# Patient Record
Sex: MALE | Race: BLACK OR AFRICAN AMERICAN | NOT HISPANIC OR LATINO | URBAN - METROPOLITAN AREA
[De-identification: names, ages, dates, MRNs, and addresses within clinical notes are randomized per-mention and may not be internally consistent; named-entity substitution may affect disease eponyms.]

---

## 2017-07-05 ENCOUNTER — EMERGENCY (EMERGENCY)
Facility: HOSPITAL | Age: 52
LOS: 1 days | Discharge: HOME | End: 2017-07-05
Attending: EMERGENCY MEDICINE
Payer: COMMERCIAL

## 2017-07-05 VITALS
TEMPERATURE: 98 F | DIASTOLIC BLOOD PRESSURE: 108 MMHG | RESPIRATION RATE: 16 BRPM | OXYGEN SATURATION: 100 % | SYSTOLIC BLOOD PRESSURE: 143 MMHG | HEART RATE: 88 BPM

## 2017-07-05 LAB
ALBUMIN SERPL ELPH-MCNC: 3.8 G/DL — SIGNIFICANT CHANGE UP (ref 3.3–5)
ALP SERPL-CCNC: 82 U/L — SIGNIFICANT CHANGE UP (ref 40–120)
ALT FLD-CCNC: 21 U/L — SIGNIFICANT CHANGE UP (ref 4–41)
APTT BLD: 44.7 SEC — HIGH (ref 27.5–37.4)
AST SERPL-CCNC: 32 U/L — SIGNIFICANT CHANGE UP (ref 4–40)
BASE EXCESS BLDV CALC-SCNC: 4.6 MMOL/L — SIGNIFICANT CHANGE UP
BASOPHILS # BLD AUTO: 0.03 K/UL — SIGNIFICANT CHANGE UP (ref 0–0.2)
BASOPHILS NFR BLD AUTO: 0.3 % — SIGNIFICANT CHANGE UP (ref 0–2)
BILIRUB SERPL-MCNC: 0.4 MG/DL — SIGNIFICANT CHANGE UP (ref 0.2–1.2)
BLOOD GAS VENOUS - CREATININE: 1.08 MG/DL — SIGNIFICANT CHANGE UP (ref 0.5–1.3)
BUN SERPL-MCNC: 10 MG/DL — SIGNIFICANT CHANGE UP (ref 7–23)
CALCIUM SERPL-MCNC: 9.1 MG/DL — SIGNIFICANT CHANGE UP (ref 8.4–10.5)
CHLORIDE BLDV-SCNC: 105 MMOL/L — SIGNIFICANT CHANGE UP (ref 96–108)
CHLORIDE SERPL-SCNC: 99 MMOL/L — SIGNIFICANT CHANGE UP (ref 98–107)
CO2 SERPL-SCNC: 25 MMOL/L — SIGNIFICANT CHANGE UP (ref 22–31)
CREAT SERPL-MCNC: 1.21 MG/DL — SIGNIFICANT CHANGE UP (ref 0.5–1.3)
EOSINOPHIL # BLD AUTO: 0.07 K/UL — SIGNIFICANT CHANGE UP (ref 0–0.5)
EOSINOPHIL NFR BLD AUTO: 0.8 % — SIGNIFICANT CHANGE UP (ref 0–6)
GAS PNL BLDV: 131 MMOL/L — LOW (ref 136–146)
GLUCOSE BLDV-MCNC: 147 — HIGH (ref 70–99)
GLUCOSE SERPL-MCNC: 98 MG/DL — SIGNIFICANT CHANGE UP (ref 70–99)
HCO3 BLDV-SCNC: 28 MMOL/L — HIGH (ref 20–27)
HCT VFR BLD CALC: 37.5 % — LOW (ref 39–50)
HCT VFR BLDV CALC: 38.8 % — LOW (ref 39–51)
HGB BLD-MCNC: 13.2 G/DL — SIGNIFICANT CHANGE UP (ref 13–17)
HGB BLDV-MCNC: 12.6 G/DL — LOW (ref 13–17)
IMM GRANULOCYTES # BLD AUTO: 0.03 # — SIGNIFICANT CHANGE UP
IMM GRANULOCYTES NFR BLD AUTO: 0.3 % — SIGNIFICANT CHANGE UP (ref 0–1.5)
INR BLD: 0.92 — SIGNIFICANT CHANGE UP (ref 0.88–1.17)
LACTATE BLDV-MCNC: 1.3 MMOL/L — SIGNIFICANT CHANGE UP (ref 0.5–2)
LIDOCAIN IGE QN: 54.8 U/L — SIGNIFICANT CHANGE UP (ref 7–60)
LYMPHOCYTES # BLD AUTO: 1.87 K/UL — SIGNIFICANT CHANGE UP (ref 1–3.3)
LYMPHOCYTES # BLD AUTO: 20.7 % — SIGNIFICANT CHANGE UP (ref 13–44)
MCHC RBC-ENTMCNC: 30.8 PG — SIGNIFICANT CHANGE UP (ref 27–34)
MCHC RBC-ENTMCNC: 35.2 % — SIGNIFICANT CHANGE UP (ref 32–36)
MCV RBC AUTO: 87.6 FL — SIGNIFICANT CHANGE UP (ref 80–100)
MONOCYTES # BLD AUTO: 1.04 K/UL — HIGH (ref 0–0.9)
MONOCYTES NFR BLD AUTO: 11.5 % — SIGNIFICANT CHANGE UP (ref 2–14)
NEUTROPHILS # BLD AUTO: 6 K/UL — SIGNIFICANT CHANGE UP (ref 1.8–7.4)
NEUTROPHILS NFR BLD AUTO: 66.4 % — SIGNIFICANT CHANGE UP (ref 43–77)
NRBC # FLD: 0 — SIGNIFICANT CHANGE UP
PCO2 BLDV: 42 MMHG — SIGNIFICANT CHANGE UP (ref 41–51)
PH BLDV: 7.45 PH — HIGH (ref 7.32–7.43)
PLATELET # BLD AUTO: 211 K/UL — SIGNIFICANT CHANGE UP (ref 150–400)
PMV BLD: 11.5 FL — SIGNIFICANT CHANGE UP (ref 7–13)
PO2 BLDV: 38 MMHG — SIGNIFICANT CHANGE UP (ref 35–40)
POTASSIUM BLDV-SCNC: 3.9 MMOL/L — SIGNIFICANT CHANGE UP (ref 3.4–4.5)
POTASSIUM SERPL-MCNC: 4.2 MMOL/L — SIGNIFICANT CHANGE UP (ref 3.5–5.3)
POTASSIUM SERPL-SCNC: 4.2 MMOL/L — SIGNIFICANT CHANGE UP (ref 3.5–5.3)
PROT SERPL-MCNC: 6.8 G/DL — SIGNIFICANT CHANGE UP (ref 6–8.3)
PROTHROM AB SERPL-ACNC: 10.3 SEC — SIGNIFICANT CHANGE UP (ref 9.8–13.1)
RBC # BLD: 4.28 M/UL — SIGNIFICANT CHANGE UP (ref 4.2–5.8)
RBC # FLD: 13.2 % — SIGNIFICANT CHANGE UP (ref 10.3–14.5)
SAO2 % BLDV: 70.4 % — SIGNIFICANT CHANGE UP (ref 60–85)
SODIUM SERPL-SCNC: 137 MMOL/L — SIGNIFICANT CHANGE UP (ref 135–145)
WBC # BLD: 9.04 K/UL — SIGNIFICANT CHANGE UP (ref 3.8–10.5)
WBC # FLD AUTO: 9.04 K/UL — SIGNIFICANT CHANGE UP (ref 3.8–10.5)

## 2017-07-05 PROCEDURE — 76705 ECHO EXAM OF ABDOMEN: CPT | Mod: 26

## 2017-07-05 PROCEDURE — 99284 EMERGENCY DEPT VISIT MOD MDM: CPT

## 2017-07-05 RX ORDER — KETOROLAC TROMETHAMINE 30 MG/ML
15 SYRINGE (ML) INJECTION ONCE
Qty: 0 | Refills: 0 | Status: DISCONTINUED | OUTPATIENT
Start: 2017-07-05 | End: 2017-07-05

## 2017-07-05 RX ORDER — ONDANSETRON 8 MG/1
4 TABLET, FILM COATED ORAL ONCE
Qty: 0 | Refills: 0 | Status: COMPLETED | OUTPATIENT
Start: 2017-07-05 | End: 2017-07-05

## 2017-07-05 RX ORDER — OMEPRAZOLE 10 MG/1
1 CAPSULE, DELAYED RELEASE ORAL
Qty: 30 | Refills: 0
Start: 2017-07-05 | End: 2017-08-04

## 2017-07-05 RX ORDER — SODIUM CHLORIDE 9 MG/ML
2000 INJECTION INTRAMUSCULAR; INTRAVENOUS; SUBCUTANEOUS ONCE
Qty: 0 | Refills: 0 | Status: COMPLETED | OUTPATIENT
Start: 2017-07-05 | End: 2017-07-05

## 2017-07-05 RX ADMIN — ONDANSETRON 4 MILLIGRAM(S): 8 TABLET, FILM COATED ORAL at 18:32

## 2017-07-05 RX ADMIN — SODIUM CHLORIDE 2000 MILLILITER(S): 9 INJECTION INTRAMUSCULAR; INTRAVENOUS; SUBCUTANEOUS at 18:32

## 2017-07-05 RX ADMIN — Medication 15 MILLIGRAM(S): at 18:32

## 2017-07-05 NOTE — ED PROVIDER NOTE - MEDICAL DECISION MAKING DETAILS
50 y/o M chronic smoker with epigastric abdominal pain x 5 days and unintentional weight loss x months. scleral icterus, epigastric abd pain. Concern for biliary pathology vs colon CA vs PUD. cbc, cmp, lipase, US GB/liver, pain control, IVF

## 2017-07-05 NOTE — ED PROVIDER NOTE - PROGRESS NOTE DETAILS
Patient's symptoms have resolved after antacids and zofran. Blood work WNLs with normal GB ultrasound. Will be discharged with GI f/u for endoscopy. States had a normal colonoscopy one year ago.

## 2017-07-05 NOTE — ED PROVIDER NOTE - ATTENDING CONTRIBUTION TO CARE
I was physically present for the E/M service provided. I agree with above history, physical, and plan which I have reviewed and edited where appropriate. I was physically present for the key portions of the service provided.    50 y/o M with no pertinent medical or surgical history presents with complaint of upper abdominal pain x 5 days. does not appear jaundice without significant sx of vomiting but admits to bloody stool on and off and weight loss.      ddx- hepatitis vs colon ca vs PUD vs biliary path vs pancreatitis vs gastritis  order- labs, gi cocktail, fluids, RUQ sono, re-assess  coursE:  labs unremarkable.  sono unremarkable.  pain improve with gi cocktail.  po in ed.  normal lft and alk phos. normal coags    dx abd pain possibly due to PUD.  f/u with gi and pcp.  rx ppi.  left ambulatory

## 2017-07-05 NOTE — ED PROVIDER NOTE - OBJECTIVE STATEMENT
50 y/o M with no pertinent medical or surgical history presents with complaint of upper abdominal pain x 5 days. Patient reports 17 lb unintentional weight loss over the last 3-4 months. States that he has had no appetite since pain began 5 days ago. Pain increased with eating. Reports that he has had intermittent blood in stool for months, no active bleeding. Chronic smoker.

## 2017-07-05 NOTE — ED ADULT NURSE NOTE - OBJECTIVE STATEMENT
pt presents to ED intake R#10B Aox4, in NAD, c/o midepigastric burning with loss of appetite x5 days. Also reports unintentional weight loss 17lbs xfew months. Denies fevers/ chills/ urinary symptoms/ diarrhea/ constipation/ cough/ other acute complaints. Denies sick contacts or recent travel. IV inserted, BW collected and sent to lab. Meds admin as per EMAR. Brought to [place].

## 2017-07-05 NOTE — ED ADULT TRIAGE NOTE - CHIEF COMPLAINT QUOTE
Pt c/o feeling a burning in the stomach. Pt has lost 17 pounds in the last 4 months. Pt hasn't been eating since Wednesday.

## 2019-09-10 ENCOUNTER — EMERGENCY (EMERGENCY)
Facility: HOSPITAL | Age: 54
LOS: 1 days | Discharge: HOME | End: 2019-09-10
Attending: EMERGENCY MEDICINE | Admitting: EMERGENCY MEDICINE
Payer: SELF-PAY

## 2019-09-10 VITALS
OXYGEN SATURATION: 100 % | DIASTOLIC BLOOD PRESSURE: 115 MMHG | HEART RATE: 88 BPM | TEMPERATURE: 98 F | SYSTOLIC BLOOD PRESSURE: 153 MMHG | RESPIRATION RATE: 16 BRPM

## 2019-09-10 LAB
ALBUMIN SERPL ELPH-MCNC: 4 G/DL — SIGNIFICANT CHANGE UP (ref 3.3–5)
ALP SERPL-CCNC: 121 U/L — HIGH (ref 40–120)
ALT FLD-CCNC: 10 U/L — SIGNIFICANT CHANGE UP (ref 4–41)
ANION GAP SERPL CALC-SCNC: 14 MMO/L — SIGNIFICANT CHANGE UP (ref 7–14)
APPEARANCE UR: CLEAR — SIGNIFICANT CHANGE UP
APTT BLD: 50.5 SEC — HIGH (ref 27.5–36.3)
AST SERPL-CCNC: 10 U/L — SIGNIFICANT CHANGE UP (ref 4–40)
BACTERIA # UR AUTO: SIGNIFICANT CHANGE UP
BASE EXCESS BLDV CALC-SCNC: 2.8 MMOL/L — SIGNIFICANT CHANGE UP
BASOPHILS # BLD AUTO: 0.07 K/UL — SIGNIFICANT CHANGE UP (ref 0–0.2)
BASOPHILS NFR BLD AUTO: 0.6 % — SIGNIFICANT CHANGE UP (ref 0–2)
BILIRUB SERPL-MCNC: 0.6 MG/DL — SIGNIFICANT CHANGE UP (ref 0.2–1.2)
BILIRUB UR-MCNC: NEGATIVE — SIGNIFICANT CHANGE UP
BLOOD GAS VENOUS - CREATININE: 1.4 MG/DL — HIGH (ref 0.5–1.3)
BLOOD UR QL VISUAL: NEGATIVE — SIGNIFICANT CHANGE UP
BUN SERPL-MCNC: 13 MG/DL — SIGNIFICANT CHANGE UP (ref 7–23)
CALCIUM SERPL-MCNC: 9.4 MG/DL — SIGNIFICANT CHANGE UP (ref 8.4–10.5)
CHLORIDE BLDV-SCNC: 106 MMOL/L — SIGNIFICANT CHANGE UP (ref 96–108)
CHLORIDE SERPL-SCNC: 100 MMOL/L — SIGNIFICANT CHANGE UP (ref 98–107)
CO2 SERPL-SCNC: 23 MMOL/L — SIGNIFICANT CHANGE UP (ref 22–31)
COLOR SPEC: YELLOW — SIGNIFICANT CHANGE UP
CREAT SERPL-MCNC: 1.47 MG/DL — HIGH (ref 0.5–1.3)
EOSINOPHIL # BLD AUTO: 0.08 K/UL — SIGNIFICANT CHANGE UP (ref 0–0.5)
EOSINOPHIL NFR BLD AUTO: 0.6 % — SIGNIFICANT CHANGE UP (ref 0–6)
GAS PNL BLDV: 132 MMOL/L — LOW (ref 136–146)
GLUCOSE BLDV-MCNC: 93 MG/DL — SIGNIFICANT CHANGE UP (ref 70–99)
GLUCOSE SERPL-MCNC: 92 MG/DL — SIGNIFICANT CHANGE UP (ref 70–99)
GLUCOSE UR-MCNC: NEGATIVE — SIGNIFICANT CHANGE UP
HCO3 BLDV-SCNC: 25 MMOL/L — SIGNIFICANT CHANGE UP (ref 20–27)
HCT VFR BLD CALC: 47.2 % — SIGNIFICANT CHANGE UP (ref 39–50)
HCT VFR BLDV CALC: 50.9 % — SIGNIFICANT CHANGE UP (ref 39–51)
HGB BLD-MCNC: 16.1 G/DL — SIGNIFICANT CHANGE UP (ref 13–17)
HGB BLDV-MCNC: 16.6 G/DL — SIGNIFICANT CHANGE UP (ref 13–17)
HYALINE CASTS # UR AUTO: NEGATIVE — SIGNIFICANT CHANGE UP
IMM GRANULOCYTES NFR BLD AUTO: 0.5 % — SIGNIFICANT CHANGE UP (ref 0–1.5)
INR BLD: 1.02 — SIGNIFICANT CHANGE UP (ref 0.88–1.17)
KETONES UR-MCNC: SIGNIFICANT CHANGE UP
LACTATE BLDV-MCNC: 1.6 MMOL/L — SIGNIFICANT CHANGE UP (ref 0.5–2)
LEUKOCYTE ESTERASE UR-ACNC: SIGNIFICANT CHANGE UP
LIDOCAIN IGE QN: 34.1 U/L — SIGNIFICANT CHANGE UP (ref 7–60)
LYMPHOCYTES # BLD AUTO: 18.6 % — SIGNIFICANT CHANGE UP (ref 13–44)
LYMPHOCYTES # BLD AUTO: 2.31 K/UL — SIGNIFICANT CHANGE UP (ref 1–3.3)
MCHC RBC-ENTMCNC: 29.3 PG — SIGNIFICANT CHANGE UP (ref 27–34)
MCHC RBC-ENTMCNC: 34.1 % — SIGNIFICANT CHANGE UP (ref 32–36)
MCV RBC AUTO: 85.8 FL — SIGNIFICANT CHANGE UP (ref 80–100)
MONOCYTES # BLD AUTO: 1.31 K/UL — HIGH (ref 0–0.9)
MONOCYTES NFR BLD AUTO: 10.6 % — SIGNIFICANT CHANGE UP (ref 2–14)
NEUTROPHILS # BLD AUTO: 8.57 K/UL — HIGH (ref 1.8–7.4)
NEUTROPHILS NFR BLD AUTO: 69.1 % — SIGNIFICANT CHANGE UP (ref 43–77)
NITRITE UR-MCNC: NEGATIVE — SIGNIFICANT CHANGE UP
NRBC # FLD: 0 K/UL — SIGNIFICANT CHANGE UP (ref 0–0)
PCO2 BLDV: 44 MMHG — SIGNIFICANT CHANGE UP (ref 41–51)
PH BLDV: 7.41 PH — SIGNIFICANT CHANGE UP (ref 7.32–7.43)
PH UR: 7.5 — SIGNIFICANT CHANGE UP (ref 5–8)
PLATELET # BLD AUTO: 210 K/UL — SIGNIFICANT CHANGE UP (ref 150–400)
PMV BLD: 10.8 FL — SIGNIFICANT CHANGE UP (ref 7–13)
PO2 BLDV: 31 MMHG — LOW (ref 35–40)
POTASSIUM BLDV-SCNC: 4.4 MMOL/L — SIGNIFICANT CHANGE UP (ref 3.4–4.5)
POTASSIUM SERPL-MCNC: 4.5 MMOL/L — SIGNIFICANT CHANGE UP (ref 3.5–5.3)
POTASSIUM SERPL-SCNC: 4.5 MMOL/L — SIGNIFICANT CHANGE UP (ref 3.5–5.3)
PROT SERPL-MCNC: 7 G/DL — SIGNIFICANT CHANGE UP (ref 6–8.3)
PROT UR-MCNC: 20 — SIGNIFICANT CHANGE UP
PROTHROM AB SERPL-ACNC: 11.3 SEC — SIGNIFICANT CHANGE UP (ref 9.8–13.1)
RBC # BLD: 5.5 M/UL — SIGNIFICANT CHANGE UP (ref 4.2–5.8)
RBC # FLD: 14.3 % — SIGNIFICANT CHANGE UP (ref 10.3–14.5)
RBC CASTS # UR COMP ASSIST: SIGNIFICANT CHANGE UP (ref 0–?)
SAO2 % BLDV: 54.1 % — LOW (ref 60–85)
SODIUM SERPL-SCNC: 137 MMOL/L — SIGNIFICANT CHANGE UP (ref 135–145)
SP GR SPEC: 1.01 — SIGNIFICANT CHANGE UP (ref 1–1.04)
SQUAMOUS # UR AUTO: SIGNIFICANT CHANGE UP
UROBILINOGEN FLD QL: NORMAL — SIGNIFICANT CHANGE UP
WBC # BLD: 12.4 K/UL — HIGH (ref 3.8–10.5)
WBC # FLD AUTO: 12.4 K/UL — HIGH (ref 3.8–10.5)
WBC UR QL: HIGH (ref 0–?)

## 2019-09-10 PROCEDURE — 76705 ECHO EXAM OF ABDOMEN: CPT | Mod: 26

## 2019-09-10 PROCEDURE — 99285 EMERGENCY DEPT VISIT HI MDM: CPT

## 2019-09-10 RX ORDER — MORPHINE SULFATE 50 MG/1
4 CAPSULE, EXTENDED RELEASE ORAL ONCE
Refills: 0 | Status: DISCONTINUED | OUTPATIENT
Start: 2019-09-10 | End: 2019-09-10

## 2019-09-10 RX ORDER — ONDANSETRON 8 MG/1
4 TABLET, FILM COATED ORAL ONCE
Refills: 0 | Status: COMPLETED | OUTPATIENT
Start: 2019-09-10 | End: 2019-09-10

## 2019-09-10 RX ORDER — SODIUM CHLORIDE 9 MG/ML
1000 INJECTION INTRAMUSCULAR; INTRAVENOUS; SUBCUTANEOUS ONCE
Refills: 0 | Status: COMPLETED | OUTPATIENT
Start: 2019-09-10 | End: 2019-09-10

## 2019-09-10 RX ORDER — FAMOTIDINE 10 MG/ML
20 INJECTION INTRAVENOUS ONCE
Refills: 0 | Status: COMPLETED | OUTPATIENT
Start: 2019-09-10 | End: 2019-09-10

## 2019-09-10 RX ADMIN — MORPHINE SULFATE 4 MILLIGRAM(S): 50 CAPSULE, EXTENDED RELEASE ORAL at 22:41

## 2019-09-10 RX ADMIN — SODIUM CHLORIDE 1000 MILLILITER(S): 9 INJECTION INTRAMUSCULAR; INTRAVENOUS; SUBCUTANEOUS at 20:20

## 2019-09-10 RX ADMIN — MORPHINE SULFATE 4 MILLIGRAM(S): 50 CAPSULE, EXTENDED RELEASE ORAL at 20:20

## 2019-09-10 RX ADMIN — MORPHINE SULFATE 4 MILLIGRAM(S): 50 CAPSULE, EXTENDED RELEASE ORAL at 22:33

## 2019-09-10 RX ADMIN — ONDANSETRON 4 MILLIGRAM(S): 8 TABLET, FILM COATED ORAL at 20:20

## 2019-09-10 RX ADMIN — FAMOTIDINE 20 MILLIGRAM(S): 10 INJECTION INTRAVENOUS at 20:20

## 2019-09-10 NOTE — ED PROVIDER NOTE - ATTENDING CONTRIBUTION TO CARE
54M p/w intermittent abd pain x 4 months, mostly epig, a/w nausea and vomiting.  Occ blood in vomitus, occ watery diarrhea.  Worsening since saturday.  20-30 lbs weight loss in past year.  Epig and RLQ ttp here.  Last blood was Sat in the vomiting.  Some guarding.  Possibly Colon CA vs IBS vs appy vs brenda.  Rx pain meds, check labs, give fluids, check urine, reass.   VS:  unremarkable except htn    GEN - mild distress abd pain; A+O x3   HEAD - NC/AT     ENT - PEERL, EOMI, mucous membranes  dry , no discharge      NECK: Neck supple, non-tender without lymphadenopathy, no masses, no JVD  PULM - CTA b/l,  symmetric breath sounds  COR -  normal heart sounds    ABD - , ND, RUQ and LLQ ttp, soft,  BACK - no CVA tenderness, nontender spine     EXTREMS - no edema, no deformity, warm and well perfused    SKIN - no rash or bruising      NEUROLOGIC - alert, CN 2-12 intact, sensation nl, motor no focal deficit.

## 2019-09-10 NOTE — ED PROVIDER NOTE - PROGRESS NOTE DETAILS
BRANDY Solis - Pt. feeling better after bowel movement - discussed admission vs outpt gi follow up - states would like to go [place] and follow up with gastroenterologist. Pt. ate a cookie and feels ok to go home. DC with outpt GI follow up.

## 2019-09-10 NOTE — ED PROVIDER NOTE - CLINICAL SUMMARY MEDICAL DECISION MAKING FREE TEXT BOX
Pt is a 55 y/o M smoker no PMHx p/w abdominal pain x 4 days -- possible colon ca, possible cholecystitis, possible appendicitis, possible colitis, possible gastroenteritis -- labs, lipase, vbg, ct abd and pelvis, ua ,ucx

## 2019-09-10 NOTE — ED PROVIDER NOTE - PATIENT PORTAL LINK FT
You can access the FollowMyHealth Patient Portal offered by [facility] by registering at the following website: [email or web address]. By joining [facility]’s FollowMyHealth portal, you will also be able to view your health information using other applications (apps) compatible with our system.

## 2019-09-10 NOTE — ED PROVIDER NOTE - OBJECTIVE STATEMENT
Pt is a 53 y/o M smoker no PMHx p/w abdominal pain x 4 days.  Pt notes gradual onset moderate to severe epigastric pain radiating to RUQ and RLQ associated with occasional nausea and vomiting, nonbilious, occasionally with scant amount of blood.  Pt also notes associated watery diarrhea, occasionally with scant amount of blood.  Pt notes associated decreased appetite.  Pt notes he has had same symptoms intermittently for past four months.  Pt states he had ~ 25-30 lb weight loss over past 1 year.  Pt notes last BM was today; able to pass gas.  Pt notes presently pain is 7/10 in intensity.  Pt denies any fevers, chills, chest pain, SOB, dysuria, cloudy urine, melena, illicit drug use, ETOH abuse, heavy NSAID use, or any other specific complaints.

## 2019-09-11 VITALS
RESPIRATION RATE: 17 BRPM | SYSTOLIC BLOOD PRESSURE: 135 MMHG | HEART RATE: 76 BPM | DIASTOLIC BLOOD PRESSURE: 75 MMHG | OXYGEN SATURATION: 99 %

## 2019-09-11 LAB
BLD GP AB SCN SERPL QL: NEGATIVE — SIGNIFICANT CHANGE UP
C DIFF TOX GENS STL QL NAA+PROBE: SIGNIFICANT CHANGE UP
RH IG SCN BLD-IMP: POSITIVE — SIGNIFICANT CHANGE UP

## 2019-09-11 PROCEDURE — 74177 CT ABD & PELVIS W/CONTRAST: CPT | Mod: 26

## 2019-09-11 RX ORDER — OMEPRAZOLE 10 MG/1
1 CAPSULE, DELAYED RELEASE ORAL
Qty: 14 | Refills: 0
Start: 2019-09-11 | End: 2019-09-24

## 2019-09-11 RX ORDER — FAMOTIDINE 10 MG/ML
1 INJECTION INTRAVENOUS
Qty: 7 | Refills: 0
Start: 2019-09-11 | End: 2019-09-17

## 2019-09-11 RX ORDER — LIDOCAINE 4 G/100G
10 CREAM TOPICAL ONCE
Refills: 0 | Status: COMPLETED | OUTPATIENT
Start: 2019-09-11 | End: 2019-09-11

## 2019-09-11 RX ADMIN — MORPHINE SULFATE 4 MILLIGRAM(S): 50 CAPSULE, EXTENDED RELEASE ORAL at 01:48

## 2019-09-11 RX ADMIN — LIDOCAINE 10 MILLILITER(S): 4 CREAM TOPICAL at 03:20

## 2019-09-11 RX ADMIN — Medication 30 MILLILITER(S): at 02:30

## 2019-09-11 RX ADMIN — Medication 10 MILLILITER(S): at 03:20

## 2019-09-11 RX ADMIN — Medication 30 MILLILITER(S): at 03:20

## 2019-09-11 NOTE — ED ADULT NURSE NOTE - OBJECTIVE STATEMENT
Initial Nurse note not documented. Pt is a 54 year old male reporting to the Ed for RUQ pain radiating to RLQ. Pt reports pain started 4 days ago. PT reports diarrhea. PT is AOX4. PT denies chest pain or SOB. PT rr even an unlabored. PT appears to be in NAd. Pt deneis fever, chills, dysuria. PT received medication as ordered, will continue to monitor.

## 2019-09-12 LAB
BACTERIA UR CULT: SIGNIFICANT CHANGE UP
SPECIMEN SOURCE: SIGNIFICANT CHANGE UP
SPECIMEN SOURCE: SIGNIFICANT CHANGE UP

## 2019-09-13 LAB — BACTERIA STL CULT: SIGNIFICANT CHANGE UP

## 2019-09-14 LAB
O+P SPEC CONC: SIGNIFICANT CHANGE UP
SPECIMEN SOURCE: SIGNIFICANT CHANGE UP
TRI STN SPEC: SIGNIFICANT CHANGE UP

## 2020-10-07 ENCOUNTER — APPOINTMENT (OUTPATIENT)
Dept: SURGICAL ONCOLOGY | Facility: CLINIC | Age: 55
End: 2020-10-07
Payer: MEDICAID

## 2020-10-07 VITALS
DIASTOLIC BLOOD PRESSURE: 100 MMHG | SYSTOLIC BLOOD PRESSURE: 144 MMHG | HEART RATE: 87 BPM | OXYGEN SATURATION: 99 % | WEIGHT: 131 LBS | HEIGHT: 67 IN | BODY MASS INDEX: 20.56 KG/M2

## 2020-10-07 PROCEDURE — 99204 OFFICE O/P NEW MOD 45 MIN: CPT

## 2020-10-12 ENCOUNTER — OUTPATIENT (OUTPATIENT)
Dept: OUTPATIENT SERVICES | Facility: HOSPITAL | Age: 55
LOS: 1 days | End: 2020-10-12
Payer: MEDICAID

## 2020-10-12 ENCOUNTER — RESULT REVIEW (OUTPATIENT)
Age: 55
End: 2020-10-12

## 2020-10-12 DIAGNOSIS — K63.89 OTHER SPECIFIED DISEASES OF INTESTINE: ICD-10-CM

## 2020-10-12 PROCEDURE — 88321 CONSLTJ&REPRT SLD PREP ELSWR: CPT

## 2020-10-13 NOTE — HISTORY OF PRESENT ILLNESS
[de-identified] : Mr. Katiana Gotti is a 56 y/o Male presented today as an initial consultation for a large colon mass detected previously Dr. Awais Peres due to complaints of lower abd pain. Previous Colonoscopy 08/22/20:  Large mass, pathology: Colon, sigmoid, r/o cancer, biopsy: Tubular adenoma w/ very local high grade dysplasia, fragments of. MRI: 08/24/20 No intrahepatic or extrahepatic biliary dilatation.  There is no filling defect. Lesion not amenable for endoscopic resection per .\par \par Today 10/07/20: Today the pt presented w/ slightly lowered abd pain and increasing after completion of rectal exam. Pt denies any irregular eating habits.  Denies constitutional symptoms. Denies nausea, vomiting, changes in appetite or bowel habits. Denies weight loss. \par \par PMHX:\par - HTN\par - Active smoker: 6-7 cigarettes daily - about 30 yrs now.\par - Marjiuana user: started in mid september 2020 occassionally. \par - pt is a Lee. \par \par PCP: \par Dr. Awais Peres\par [address] [place] [address]\par [phone]\par \par Cardiologist:\par Dr. Milo Loza\par [address] [address] [address]\par [phone]

## 2020-10-13 NOTE — PHYSICAL EXAM
[Normal] : normal breast inspection and palpation of axillas [Normal Male] : prostate smooth, symmetric with no modularity or induration [Normal] : oriented to person, place and time, with appropriate affect [de-identified] : Abdomen soft, non-tender, non- distended, and no palpable masses. \par   [FreeTextEntry1] : Rectal exam completed 10/07/2020:-  rectal wall filled with stool, no internal  masses/ rectal bleeding present.  \par Rectal exam completed 10/07/2020:- unable to exam colon due to rectal wall filled with stool, no internal abd masses/ rectal bleeding present.  \par

## 2020-10-13 NOTE — ADDENDUM
[FreeTextEntry1] : I, Agnes Garrett, acted solely as a scribe for Dr. Bermudez on this date 10/07/2020

## 2020-10-14 LAB — SURGICAL PATHOLOGY STUDY: SIGNIFICANT CHANGE UP

## 2020-10-22 DIAGNOSIS — Z01.818 ENCOUNTER FOR OTHER PREPROCEDURAL EXAMINATION: ICD-10-CM

## 2020-10-23 ENCOUNTER — APPOINTMENT (OUTPATIENT)
Dept: DISASTER EMERGENCY | Facility: CLINIC | Age: 55
End: 2020-10-23

## 2020-10-23 VITALS
HEART RATE: 65 BPM | DIASTOLIC BLOOD PRESSURE: 75 MMHG | SYSTOLIC BLOOD PRESSURE: 119 MMHG | RESPIRATION RATE: 18 BRPM | OXYGEN SATURATION: 97 % | TEMPERATURE: 98 F

## 2020-10-23 NOTE — H&P ADULT - HISTORY OF PRESENT ILLNESS
Problem: Impaired Swallowing  Goal: Minimize aspiration risk  Interventions:   Supervision with all meals  - Patient should be alert and upright for all feedings (90 degrees preferred)  - Offer food and liquids at a slow rate  - Encourage small bites of fo Cardiologist: Dr. Loza  Pharmacy: [facility] ([place]) Zip code: [address]  Escort: Wife  COVID-19 PCR: 10/23/2020: Sergio Valle (Result Pending)    PRE OP  54 yo male, current smoker (3 cig/day), current marijuana user, with a PMhx of pancreatic colonic lesions, HTN presented to cardiologist Dr. Loza for cardiac clearance for lower anterior resection robotic assisted surgery for colonic mass. Patient reports intermittent diffuse abdominal pain for several months for which he uses marijuana to help subside discomfort. He denies any current chest pain, palpitations, dizziness, fever, chills, SOB/HERBERT, leg edema, PND, orthopnea, syncope. Per MD office note; he endorses HERBERT while walking 2-3 flights of stairs when he is having abdominal pain. 12 Lead EKG during office visit revealed NSR with LVH strain pattern. Echo (9/2020 per MD note) revealed LVEF 40-45%, moderate LVH, grade 1 diastolic dysfunction consistent with impaired relaxation and normal filling pressures. Resting regional WMA of the inferoseptal wall, mild MR and mild TR. NST (9/15/2020) revealed cardiomyopathy with mild to moderate inferior posterior scaring. Mild LV enlargement and mild to moderate LV systolic dysfunction (EF 34%). Normal RV size and function.    Patient now presents for cardiac catheterization with possible intervention if clinically indicated due to need for risk stratification prior to surgery, newly diagnosed cardiomyopathy, abnormal NST and CCS Angina Class II equivalent symptoms.    Cardiologist: Dr. Loza  Pharmacy: [facility] ([place]) Zip code: [address]  Escort: Wife  COVID-19 PCR: 10/23/2020: Neg in HIE    PRE OP  54 yo male, current smoker (3 cig/day), current marijuana user, with a PMhx of pancreatic colonic lesions, HTN presented to cardiologist Dr. Loza for cardiac clearance for lower anterior resection robotic assisted surgery for colonic mass. Patient reports intermittent diffuse abdominal pain for several months for which he uses marijuana to help subside discomfort. He denies any current chest pain, palpitations, dizziness, fever, chills, SOB/HERBERT, leg edema, PND, orthopnea, syncope. Per MD office note; he endorses HERBERT while walking 2-3 flights of stairs when he is having abdominal pain. 12 Lead EKG during office visit revealed NSR with LVH strain pattern. Echo (9/2020 per MD note) revealed LVEF 40-45%, moderate LVH, grade 1 diastolic dysfunction consistent with impaired relaxation and normal filling pressures. Resting regional WMA of the inferoseptal wall, mild MR and mild TR. NST (9/15/2020) revealed cardiomyopathy with mild to moderate inferior posterior scaring. Mild LV enlargement and mild to moderate LV systolic dysfunction (EF 34%). Normal RV size and function.    Patient now presents for cardiac catheterization with possible intervention if clinically indicated due to need for risk stratification prior to surgery, newly diagnosed cardiomyopathy, abnormal NST and CCS Angina Class II equivalent symptoms.

## 2020-10-23 NOTE — H&P ADULT - ASSESSMENT
54 yo male, current smoker (3 cig/day), current marijuana user, with a PMhx of pancreatic colonic lesions, HTN presented to cardiologist Dr. Loza for cardiac clearance for lower anterior resection robotic assisted surgery for colonic mass. Patient reports intermittent diffuse abdominal pain for several months for which he uses marijuana to help subside discomfort. Per MD office note; he endorses HERBERT while walking 2-3 flights of stairs when he is having abdominal pain. 12 Lead EKG during office visit revealed NSR with LVH strain pattern. Echo (9/2020 per MD note) revealed LVEF 40-45%, moderate LVH, grade 1 diastolic dysfunction consistent with impaired relaxation and normal filling pressures. Resting regional WMA of the inferoseptal wall, mild MR and mild TR. NST (9/15/2020) revealed cardiomyopathy with mild to moderate inferior posterior scaring. Mild LV enlargement and mild to moderate LV systolic dysfunction (EF 34%). Normal RV size and function. Patient now presents for cardiac catheterization with possible intervention if clinically indicated due to need for risk stratification prior to surgery, newly diagnosed cardiomyopathy, abnormal NST and CCS Angina Class II equivalent symptoms.     Pt is pre-op eval. No load per fellow Dr. Robledo.   Reduced EF 34% by NST. NS @ 30cc/hr ordered pre-cath.     Mallampati Class   ASA Class II  Pt is a suitable candidate for moderate sedation.   Risks & benefits of procedure and alternative therapy have been explained to the patient including but not limited to: allergic reaction, bleeding w/possible need for blood transfusion, infection, renal and vascular compromise, limb damage, arrhythmia, stroke, vessel dissection/perforation, Myocardial infarction, emergent CABG. Informed consent obtained and in chart.   54 yo male, current smoker (3 cig/day), current marijuana user, with a PMhx of pancreatic colonic lesions, HTN presented to cardiologist Dr. Loza for cardiac clearance for lower anterior resection robotic assisted surgery for colonic mass. Patient reports intermittent diffuse abdominal pain for several months for which he uses marijuana to help subside discomfort. Per MD office note; he endorses HERBERT while walking 2-3 flights of stairs when he is having abdominal pain. 12 Lead EKG during office visit revealed NSR with LVH strain pattern. Echo (9/2020 per MD note) revealed LVEF 40-45%, moderate LVH, grade 1 diastolic dysfunction consistent with impaired relaxation and normal filling pressures. Resting regional WMA of the inferoseptal wall, mild MR and mild TR. NST (9/15/2020) revealed cardiomyopathy with mild to moderate inferior posterior scaring. Mild LV enlargement and mild to moderate LV systolic dysfunction (EF 34%). Normal RV size and function. Patient now presents for cardiac catheterization with possible intervention if clinically indicated due to need for risk stratification prior to surgery, newly diagnosed cardiomyopathy, abnormal NST and CCS Angina Class II equivalent symptoms.     Pt is pre-op eval. Pt took aspirin 81mg this am. No load per fellow Dr. Robledo.   Reduced EF 34% by NST. NS @ 30cc/hr ordered pre-cath.     Mallampati Class III  ASA Class II  Pt is a suitable candidate for moderate sedation.   Risks & benefits of procedure and alternative therapy have been explained to the patient including but not limited to: allergic reaction, bleeding w/possible need for blood transfusion, infection, renal and vascular compromise, limb damage, arrhythmia, stroke, vessel dissection/perforation, Myocardial infarction, emergent CABG. Informed consent obtained and in chart.

## 2020-10-23 NOTE — H&P ADULT - NSHPSOCIALHISTORY_GEN_ALL_CORE
Current marijuana user  Tobacco use since age 27, currently smokes 3 cig/day  ETOH: occasionally  lives with his wife

## 2020-10-24 LAB — SARS-COV-2 N GENE NPH QL NAA+PROBE: NOT DETECTED

## 2020-10-26 ENCOUNTER — APPOINTMENT (OUTPATIENT)
Dept: SURGICAL ONCOLOGY | Facility: HOSPITAL | Age: 55
End: 2020-10-26

## 2020-10-26 ENCOUNTER — OUTPATIENT (OUTPATIENT)
Dept: OUTPATIENT SERVICES | Facility: HOSPITAL | Age: 55
LOS: 1 days | Discharge: HOME | End: 2020-10-26
Payer: MEDICAID

## 2020-10-26 LAB
A1C WITH ESTIMATED AVERAGE GLUCOSE RESULT: 5.2 % — SIGNIFICANT CHANGE UP (ref 4–5.6)
ALBUMIN SERPL ELPH-MCNC: 4.1 G/DL — SIGNIFICANT CHANGE UP (ref 3.3–5)
ALP SERPL-CCNC: 107 U/L — SIGNIFICANT CHANGE UP (ref 40–120)
ALT FLD-CCNC: 14 U/L — SIGNIFICANT CHANGE UP (ref 10–45)
ANION GAP SERPL CALC-SCNC: 11 MMOL/L — SIGNIFICANT CHANGE UP (ref 5–17)
APTT BLD: 42.9 SEC — HIGH (ref 27.5–35.5)
AST SERPL-CCNC: 17 U/L — SIGNIFICANT CHANGE UP (ref 10–40)
BASOPHILS # BLD AUTO: 0.03 K/UL — SIGNIFICANT CHANGE UP (ref 0–0.2)
BASOPHILS NFR BLD AUTO: 0.3 % — SIGNIFICANT CHANGE UP (ref 0–2)
BILIRUB SERPL-MCNC: 0.6 MG/DL — SIGNIFICANT CHANGE UP (ref 0.2–1.2)
BUN SERPL-MCNC: 10 MG/DL — SIGNIFICANT CHANGE UP (ref 7–23)
CALCIUM SERPL-MCNC: 8.9 MG/DL — SIGNIFICANT CHANGE UP (ref 8.4–10.5)
CHLORIDE SERPL-SCNC: 103 MMOL/L — SIGNIFICANT CHANGE UP (ref 96–108)
CHOLEST SERPL-MCNC: 178 MG/DL — SIGNIFICANT CHANGE UP
CK MB CFR SERPL CALC: 4.3 NG/ML — SIGNIFICANT CHANGE UP (ref 0–6.7)
CK SERPL-CCNC: 212 U/L — HIGH (ref 30–200)
CO2 SERPL-SCNC: 26 MMOL/L — SIGNIFICANT CHANGE UP (ref 22–31)
CREAT SERPL-MCNC: 1.18 MG/DL — SIGNIFICANT CHANGE UP (ref 0.5–1.3)
CRP SERPL-MCNC: 0.05 MG/DL — SIGNIFICANT CHANGE UP (ref 0–0.4)
EOSINOPHIL # BLD AUTO: 0.19 K/UL — SIGNIFICANT CHANGE UP (ref 0–0.5)
EOSINOPHIL NFR BLD AUTO: 2.2 % — SIGNIFICANT CHANGE UP (ref 0–6)
ESTIMATED AVERAGE GLUCOSE: 103 MG/DL — SIGNIFICANT CHANGE UP (ref 68–114)
GLUCOSE SERPL-MCNC: 99 MG/DL — SIGNIFICANT CHANGE UP (ref 70–99)
HCT VFR BLD CALC: 41.1 % — SIGNIFICANT CHANGE UP (ref 39–50)
HDLC SERPL-MCNC: 77 MG/DL — SIGNIFICANT CHANGE UP
HGB BLD-MCNC: 13.7 G/DL — SIGNIFICANT CHANGE UP (ref 13–17)
IMM GRANULOCYTES NFR BLD AUTO: 0.2 % — SIGNIFICANT CHANGE UP (ref 0–1.5)
INR BLD: 0.91 — SIGNIFICANT CHANGE UP (ref 0.88–1.16)
LIPID PNL WITH DIRECT LDL SERPL: 62 MG/DL — SIGNIFICANT CHANGE UP
LYMPHOCYTES # BLD AUTO: 1.87 K/UL — SIGNIFICANT CHANGE UP (ref 1–3.3)
LYMPHOCYTES # BLD AUTO: 21.6 % — SIGNIFICANT CHANGE UP (ref 13–44)
MCHC RBC-ENTMCNC: 29.7 PG — SIGNIFICANT CHANGE UP (ref 27–34)
MCHC RBC-ENTMCNC: 33.3 GM/DL — SIGNIFICANT CHANGE UP (ref 32–36)
MCV RBC AUTO: 89 FL — SIGNIFICANT CHANGE UP (ref 80–100)
MONOCYTES # BLD AUTO: 0.76 K/UL — SIGNIFICANT CHANGE UP (ref 0–0.9)
MONOCYTES NFR BLD AUTO: 8.8 % — SIGNIFICANT CHANGE UP (ref 2–14)
NEUTROPHILS # BLD AUTO: 5.79 K/UL — SIGNIFICANT CHANGE UP (ref 1.8–7.4)
NEUTROPHILS NFR BLD AUTO: 66.9 % — SIGNIFICANT CHANGE UP (ref 43–77)
NON HDL CHOLESTEROL: 101 MG/DL — SIGNIFICANT CHANGE UP
NRBC # BLD: 0 /100 WBCS — SIGNIFICANT CHANGE UP (ref 0–0)
PLATELET # BLD AUTO: 244 K/UL — SIGNIFICANT CHANGE UP (ref 150–400)
POTASSIUM SERPL-MCNC: 4.2 MMOL/L — SIGNIFICANT CHANGE UP (ref 3.5–5.3)
POTASSIUM SERPL-SCNC: 4.2 MMOL/L — SIGNIFICANT CHANGE UP (ref 3.5–5.3)
PROT SERPL-MCNC: 6.5 G/DL — SIGNIFICANT CHANGE UP (ref 6–8.3)
PROTHROM AB SERPL-ACNC: 11 SEC — SIGNIFICANT CHANGE UP (ref 10.6–13.6)
RBC # BLD: 4.62 M/UL — SIGNIFICANT CHANGE UP (ref 4.2–5.8)
RBC # FLD: 14 % — SIGNIFICANT CHANGE UP (ref 10.3–14.5)
SODIUM SERPL-SCNC: 140 MMOL/L — SIGNIFICANT CHANGE UP (ref 135–145)
TRIGL SERPL-MCNC: 193 MG/DL — HIGH
WBC # BLD: 8.66 K/UL — SIGNIFICANT CHANGE UP (ref 3.8–10.5)
WBC # FLD AUTO: 8.66 K/UL — SIGNIFICANT CHANGE UP (ref 3.8–10.5)

## 2020-10-26 PROCEDURE — 86140 C-REACTIVE PROTEIN: CPT

## 2020-10-26 PROCEDURE — 93458 L HRT ARTERY/VENTRICLE ANGIO: CPT

## 2020-10-26 PROCEDURE — 82553 CREATINE MB FRACTION: CPT

## 2020-10-26 PROCEDURE — 83036 HEMOGLOBIN GLYCOSYLATED A1C: CPT

## 2020-10-26 PROCEDURE — 93571 IV DOP VEL&/PRESS C FLO 1ST: CPT | Mod: RC

## 2020-10-26 PROCEDURE — C1887: CPT

## 2020-10-26 PROCEDURE — C1894: CPT

## 2020-10-26 PROCEDURE — 93005 ELECTROCARDIOGRAM TRACING: CPT

## 2020-10-26 PROCEDURE — 80061 LIPID PANEL: CPT

## 2020-10-26 PROCEDURE — 99202 OFFICE O/P NEW SF 15 MIN: CPT

## 2020-10-26 PROCEDURE — 93010 ELECTROCARDIOGRAM REPORT: CPT

## 2020-10-26 PROCEDURE — 85025 COMPLETE CBC W/AUTO DIFF WBC: CPT

## 2020-10-26 PROCEDURE — 85610 PROTHROMBIN TIME: CPT

## 2020-10-26 PROCEDURE — 80053 COMPREHEN METABOLIC PANEL: CPT

## 2020-10-26 PROCEDURE — 82550 ASSAY OF CK (CPK): CPT

## 2020-10-26 PROCEDURE — 85730 THROMBOPLASTIN TIME PARTIAL: CPT

## 2020-10-26 PROCEDURE — C1769: CPT

## 2020-10-26 RX ORDER — CHLORHEXIDINE GLUCONATE 213 G/1000ML
1 SOLUTION TOPICAL ONCE
Refills: 0 | Status: DISCONTINUED | OUTPATIENT
Start: 2020-10-26 | End: 2020-10-26

## 2020-10-26 RX ORDER — SODIUM CHLORIDE 9 MG/ML
500 INJECTION INTRAMUSCULAR; INTRAVENOUS; SUBCUTANEOUS
Refills: 0 | Status: DISCONTINUED | OUTPATIENT
Start: 2020-10-26 | End: 2020-10-26

## 2020-10-26 RX ADMIN — SODIUM CHLORIDE 30 MILLILITER(S): 9 INJECTION INTRAMUSCULAR; INTRAVENOUS; SUBCUTANEOUS at 16:25

## 2020-10-26 NOTE — CONSULT NOTE ADULT - SUBJECTIVE AND OBJECTIVE BOX
Preventive Cardiology Consultation Note    CHIEF COMPLAINT: s/p cardiac catheterization requiring cardiovascular prevention optimization and education    HISTORY OF PRESENT ILLNESS: 54 yo male, current smoker (3 cig/day), current marijuana user, with a PMhx of pancreatic colonic lesions and HTN presented to cardiologist Dr. Loza for cardiac clearance for lower anterior resection for colonic mass. Patient now s/p diagnostic cardiac catheterization on 10/26/2020 with positive FFR 0.7 of mid RCA lesion. LM normal. LAD mild luminal irregularities. Ramus small vessel mild diffuse disease. mLCX 30%. dLCX 40%. OM1 normal. EF 40-45%. Echo 9/2020. EDP 2.    Review of systems otherwise negative.     Lifestyle History:  Mediterranean Diet Score (9 question survey) was 4.   (8-9: optimal, 6-7: near-optimal, 4-5: suboptimal, 0-3: markedly suboptimal)  Exercise: Patient reports exercising at a moderate level for <30 minutes per week.   Smoking: Current smoker (use since age 27, currently smokes 3 cig/day).  Stress: Patient denies any stress.     PAST MEDICAL & SURGICAL HISTORY:  Colonic mass    HTN (hypertension)    No significant past surgical history    Ingrowing toenail  Right hallux      FAMILY HISTORY:   Patient denies any first degree family history of premature CAD or CVA.     Allergies:   No Known Allergies      HOME MEDICATIONS:   Aspirin Enteric Coated 81 mg oral delayed release tablet: 1 tab(s) orally once a day (26 Oct 2020 16:20)  losartan 50 mg oral tablet: 1 tab(s) orally once a day (26 Oct 2020 16:20)  pantoprazole 40 mg oral delayed release tablet: 1 tab(s) orally 2 times a day (26 Oct 2020 16:20)  Toprol-XL 50 mg oral tablet, extended release: 1 tab(s) orally once a day (26 Oct 2020 16:20)      PHYSICAL EXAM:  · Temp (F)	97.7 Degrees F  · Temp (C)	36.5 Degrees C  · Heart Rate	65 /min  · Respiration Rate (breaths/min)	18 /min  · BP Systolic	119 mm Hg  · BP Diastolic	75 mm Hg  · BP Noninvasive Mean	89 mm Hg  · SpO2 (%)	97 %  · O2 Delivery/Oxygen Delivery Method	room air    Gen- awake, conversive  Head-NCAT; eyes: no corneal arcus noted b/l; no xanthelasmas   Neck- no thyromegaly, no cervical LAD, no JVD, no carotid bruit b/l  Respiratory- good air entry b/l, no WRR  Cardiovascular- S1S2, RRR, no MRG appr, no LE edema b/l, distal pulses 2+ b/l  Gastrointestinal- no HSM, no masses  Neurology- moves all extremities, no focal deficits  Psych- normal affect, non-depressed mood  Skin- no lesions, no rashes, no xanthomas     LABS:	                        13.7   8.66  )-----------( 244      ( 26 Oct 2020 15:32 )             41.1     10-26    140  |  103  |  10  ----------------------------<  99  4.2   |  26  |  1.18    Ca    8.9      26 Oct 2020 15:32    TPro  6.5  /  Alb  4.1  /  TBili  0.6  /  DBili  x   /  AST  17  /  ALT  14  /  AlkPhos  107  10-26      Cholesterol, Serum: 178 mg/dL (10-26 @ 15:32)  HDL Cholesterol, Serum: 77 mg/dL (10-26 @ 15:32)  Triglycerides, Serum: 193 mg/dL (10-26 @ 15:32)  LDL, Serum: 72 mg/dL     A1c 5.2%    C-Reactive Protein, Serum: 0.05 mg/dL (10-26 @ 15:32)      ASSESSMENT/RECOMMENDATIONS: 	  Patient's dietary, exercise and overall lifestyle habits were reviewed. The concept of atherosclerosis and its systemic nature was discussed with a focus on the need to get all cardiovascular risk factors to goal. At this time, I would like to make the following recommendations to optimize atherosclerotic risk factors.     RECOMMENDATIONS:   Anti-platelet Therapy: APT per interventionalist recommendation.   Lipid Therapy: Based on cath results, patient is now secondary prevention and warrants statin therapy. In general, we recommend the use of atorvastatin or rosuvastatin, as tolerated. Along with lifestyle modifications, we recommend starting the patient on either of those agents for ASCVD risk reduction.   Hypertension: Blood pressures during this stay were well-controlled.   Mediterranean Diet Score is 4. Some suggestions include continue incorporating 2 or more servings per day of vegetables, fruits, and whole grains. Increase intake of fish and legumes/beans to 2 or more servings per week. Aim to increase intake of healthy fats, such as olive oil and avocados, and have a handful of nuts/seeds most days. Reduce red/processed meat consumption to 2 or fewer times per week.   Exercise: Recommended gradually increasing activity to 30-45 minutes most days of the week once cleared by referring cardiologist.   Medication Adherence: Patient has no issues with adherence at this time.   Smoking: Smoking cessation was strongly encouraged and discussed with the patient. We suggested picking a day in the future that will be the planned quit date. We recommend following up with his PCP for further assistance, if needed for nicotine replacement therapy. The risks to overall health if he continues to smoke were discussed, and patient expressed understanding.   Obesity/Overweight: The patient was advised about specific mechanisms such as reduced portions and increased activity for efforts toward weight loss.   Glucometabolic State: Patient's blood sugar is well-controlled at this time. HbA1c today was 5.2%.   Sleep Apnea: The patient is at low risk for sleep apnea.   Psychological Stress: The patient appears to be coping with stressors well at this time.     Thank you for the opportunity to see this patient. Please feel free to contact Prevention if there are any questions, or if you feel that your patient would benefit from continued follow-up visits with the Program.    Mireya Caldera, [facility]  Cardiovascular Prevention     Vee Ramos MD  System Director, Cardiovascular Prevention

## 2020-10-26 NOTE — PROGRESS NOTE ADULT - SUBJECTIVE AND OBJECTIVE BOX
Interventional Cardiology PA SDA Discharge Note    Patient without complaints. Ambulated and voided without difficulties    Afebrile, VSS    Ext:    		Right   Radial :  no  hematoma,  no   bleeding, dressing; C/D/I      Pulses:    intact RAD to baseline     A/P:  56 yo male, current smoker (3 cig/day), current marijuana user, with a PMhx of pancreatic colonic lesions, HTN presented to cardiologist Dr. Loza for cardiac clearance for lower anterior resection robotic assisted surgery for colonic mass. Patient reports intermittent diffuse abdominal pain for several months for which he uses marijuana to help subside discomfort. He denies any current chest pain, palpitations, dizziness, fever, chills, SOB/HERBERT, leg edema, PND, orthopnea, syncope. Per MD office note; he endorses HERBERT while walking 2-3 flights of stairs when he is having abdominal pain. 12 Lead EKG during office visit revealed NSR with LVH strain pattern. Echo (9/2020 per MD note) revealed LVEF 40-45%, moderate LVH, grade 1 diastolic dysfunction consistent with impaired relaxation and normal filling pressures. Resting regional WMA of the inferoseptal wall, mild MR and mild TR. NST (9/15/2020) revealed cardiomyopathy with mild to moderate inferior posterior scaring. Mild LV enlargement and mild to moderate LV systolic dysfunction (EF 34%). Normal RV size and function.  Patient now presents for cardiac catheterization with possible intervention if clinically indicated due to need for risk stratification prior to surgery, newly diagnosed cardiomyopathy, abnormal NST and CCS Angina Class II equivalent symptoms.     Pt now s/p diagnostic cardiac catheterization on 10/26/2020 with positive FFR 0.7 of RCA lesion. EF 40-45% Echo 9/2020. Right radial TR access.       1.	Stable for discharge as per attending Dr. Loza after bed rest, pt voids, groin/wrist stable and 30 minutes of ambulation.  2.	Follow-up with PMD/Cardiologist Dr Loza in 1-2 weeks  3.	Discharged forms signed and copies in chart    Interventional Cardiology PA SDA Discharge Note    Patient without complaints. Ambulated and voided without difficulties    Afebrile, VSS    Ext:    		Right   Radial :  no  hematoma,  no   bleeding, dressing; C/D/I      Pulses:    intact RAD to baseline     A/P:  56 yo male, current smoker (3 cig/day), current marijuana user, with a PMhx of pancreatic colonic lesions, HTN presented to cardiologist Dr. Loza for cardiac clearance for lower anterior resection robotic assisted surgery for colonic mass. Patient reports intermittent diffuse abdominal pain for several months for which he uses marijuana to help subside discomfort. He denies any current chest pain, palpitations, dizziness, fever, chills, SOB/HERBERT, leg edema, PND, orthopnea, syncope. Per MD office note; he endorses HERBERT while walking 2-3 flights of stairs when he is having abdominal pain. 12 Lead EKG during office visit revealed NSR with LVH strain pattern. Echo (9/2020 per MD note) revealed LVEF 40-45%, moderate LVH, grade 1 diastolic dysfunction consistent with impaired relaxation and normal filling pressures. Resting regional WMA of the inferoseptal wall, mild MR and mild TR. NST (9/15/2020) revealed cardiomyopathy with mild to moderate inferior posterior scaring. Mild LV enlargement and mild to moderate LV systolic dysfunction (EF 34%). Normal RV size and function.  Patient now presents for cardiac catheterization with possible intervention if clinically indicated due to need for risk stratification prior to surgery, newly diagnosed cardiomyopathy, abnormal NST and CCS Angina Class II equivalent symptoms.     Pt now s/p diagnostic cardiac catheterization on 10/26/2020 with positive FFR 0.7 of mid RCA lesion. LM normal. LAD mild luminal irregularities. Ramus small vessel mild diffuse disease. mLCX 30%. dLCX 40%. OM1 normal. EF 40-45%. Echo 9/2020. EDP 2. Right radial TR access.       1.	Stable for discharge as per attending Dr. Loza after bed rest, pt voids, groin/wrist stable and 30 minutes of ambulation.  2.	Follow-up with PMD/Cardiologist Dr Loza in 1-2 weeks  3.	Discharged forms signed and copies in chart

## 2020-11-03 DIAGNOSIS — I25.10 ATHEROSCLEROTIC HEART DISEASE OF NATIVE CORONARY ARTERY WITHOUT ANGINA PECTORIS: ICD-10-CM

## 2020-11-03 DIAGNOSIS — I10 ESSENTIAL (PRIMARY) HYPERTENSION: ICD-10-CM

## 2020-11-03 DIAGNOSIS — F17.200 NICOTINE DEPENDENCE, UNSPECIFIED, UNCOMPLICATED: ICD-10-CM

## 2020-11-03 DIAGNOSIS — Z01.810 ENCOUNTER FOR PREPROCEDURAL CARDIOVASCULAR EXAMINATION: ICD-10-CM

## 2020-11-03 DIAGNOSIS — E78.5 HYPERLIPIDEMIA, UNSPECIFIED: ICD-10-CM

## 2020-11-03 DIAGNOSIS — R94.39 ABNORMAL RESULT OF OTHER CARDIOVASCULAR FUNCTION STUDY: ICD-10-CM

## 2020-11-03 PROBLEM — K63.89 OTHER SPECIFIED DISEASES OF INTESTINE: Chronic | Status: ACTIVE | Noted: 2020-10-23

## 2020-11-13 DIAGNOSIS — Z86.79 PERSONAL HISTORY OF OTHER DISEASES OF THE CIRCULATORY SYSTEM: ICD-10-CM

## 2020-11-16 ENCOUNTER — APPOINTMENT (OUTPATIENT)
Dept: DISASTER EMERGENCY | Facility: CLINIC | Age: 55
End: 2020-11-16

## 2020-11-17 LAB — SARS-COV-2 N GENE NPH QL NAA+PROBE: NOT DETECTED

## 2020-11-18 ENCOUNTER — OUTPATIENT (OUTPATIENT)
Dept: OUTPATIENT SERVICES | Facility: HOSPITAL | Age: 55
LOS: 1 days | End: 2020-11-18
Payer: MEDICAID

## 2020-11-18 VITALS
HEIGHT: 65 IN | TEMPERATURE: 97 F | DIASTOLIC BLOOD PRESSURE: 80 MMHG | OXYGEN SATURATION: 98 % | WEIGHT: 134.92 LBS | SYSTOLIC BLOOD PRESSURE: 150 MMHG | HEART RATE: 84 BPM | RESPIRATION RATE: 20 BRPM

## 2020-11-18 DIAGNOSIS — K63.89 OTHER SPECIFIED DISEASES OF INTESTINE: ICD-10-CM

## 2020-11-18 LAB
ALBUMIN SERPL ELPH-MCNC: 4.2 G/DL — SIGNIFICANT CHANGE UP (ref 3.3–5)
ALP SERPL-CCNC: 120 U/L — SIGNIFICANT CHANGE UP (ref 40–120)
ALT FLD-CCNC: 13 U/L — SIGNIFICANT CHANGE UP (ref 4–41)
ANION GAP SERPL CALC-SCNC: 10 MMO/L — SIGNIFICANT CHANGE UP (ref 7–14)
AST SERPL-CCNC: 22 U/L — SIGNIFICANT CHANGE UP (ref 4–40)
BILIRUB SERPL-MCNC: 0.2 MG/DL — SIGNIFICANT CHANGE UP (ref 0.2–1.2)
BUN SERPL-MCNC: 11 MG/DL — SIGNIFICANT CHANGE UP (ref 7–23)
CALCIUM SERPL-MCNC: 9.6 MG/DL — SIGNIFICANT CHANGE UP (ref 8.4–10.5)
CHLORIDE SERPL-SCNC: 105 MMOL/L — SIGNIFICANT CHANGE UP (ref 98–107)
CO2 SERPL-SCNC: 24 MMOL/L — SIGNIFICANT CHANGE UP (ref 22–31)
CREAT SERPL-MCNC: 1.09 MG/DL — SIGNIFICANT CHANGE UP (ref 0.5–1.3)
GLUCOSE SERPL-MCNC: 75 MG/DL — SIGNIFICANT CHANGE UP (ref 70–99)
HCT VFR BLD CALC: 38.5 % — LOW (ref 39–50)
HGB BLD-MCNC: 12.6 G/DL — LOW (ref 13–17)
MCHC RBC-ENTMCNC: 29.1 PG — SIGNIFICANT CHANGE UP (ref 27–34)
MCHC RBC-ENTMCNC: 32.7 % — SIGNIFICANT CHANGE UP (ref 32–36)
MCV RBC AUTO: 88.9 FL — SIGNIFICANT CHANGE UP (ref 80–100)
NRBC # FLD: 0 K/UL — SIGNIFICANT CHANGE UP (ref 0–0)
PLATELET # BLD AUTO: 307 K/UL — SIGNIFICANT CHANGE UP (ref 150–400)
PMV BLD: 11 FL — SIGNIFICANT CHANGE UP (ref 7–13)
POTASSIUM SERPL-MCNC: 4.2 MMOL/L — SIGNIFICANT CHANGE UP (ref 3.5–5.3)
POTASSIUM SERPL-SCNC: 4.2 MMOL/L — SIGNIFICANT CHANGE UP (ref 3.5–5.3)
PROT SERPL-MCNC: 7 G/DL — SIGNIFICANT CHANGE UP (ref 6–8.3)
RBC # BLD: 4.33 M/UL — SIGNIFICANT CHANGE UP (ref 4.2–5.8)
RBC # FLD: 13.9 % — SIGNIFICANT CHANGE UP (ref 10.3–14.5)
SODIUM SERPL-SCNC: 139 MMOL/L — SIGNIFICANT CHANGE UP (ref 135–145)
WBC # BLD: 9.3 K/UL — SIGNIFICANT CHANGE UP (ref 3.8–10.5)
WBC # FLD AUTO: 9.3 K/UL — SIGNIFICANT CHANGE UP (ref 3.8–10.5)

## 2020-11-18 PROCEDURE — 93010 ELECTROCARDIOGRAM REPORT: CPT

## 2020-11-18 RX ORDER — PANTOPRAZOLE SODIUM 20 MG/1
1 TABLET, DELAYED RELEASE ORAL
Qty: 0 | Refills: 0 | DISCHARGE

## 2020-11-18 RX ORDER — ASPIRIN/CALCIUM CARB/MAGNESIUM 324 MG
1 TABLET ORAL
Qty: 0 | Refills: 0 | DISCHARGE

## 2020-11-18 RX ORDER — METOPROLOL TARTRATE 50 MG
1 TABLET ORAL
Qty: 0 | Refills: 0 | DISCHARGE

## 2020-11-18 RX ORDER — LOSARTAN POTASSIUM 100 MG/1
1 TABLET, FILM COATED ORAL
Qty: 0 | Refills: 0 | DISCHARGE

## 2020-11-18 NOTE — ASU PATIENT PROFILE, ADULT - PATIENT KNOW
2018       RE: Kitty Ibarra  [address] [facility] [id number]     Dear Colleague,    Thank you for referring your patient, Kitty Ibarra, to the [facility] at [facility]. Please see a copy of my visit note below.    Doing well. No contractions.   preg complicated by:  Patient Active Problem List   Diagnosis     Vitamin D deficiency     High-risk pregnancy in first trimester, , [facility] MD     Previous  times 2     Intramural leiomyoma of uterus     H/O myomectomy     Vitamin D deficiency     AMA 42yo      contractions     Continue weekly BPP for AMA        Karen Nieves      
yes

## 2020-11-18 NOTE — ASU PATIENT PROFILE, ADULT - PMH
CAD S/P percutaneous coronary angioplasty  10/26/2020  Colonic mass    HTN (hypertension)    Ingrown toenail

## 2020-11-18 NOTE — H&P PST ADULT - NSICDXPASTMEDICALHX_GEN_ALL_CORE_FT
PAST MEDICAL HISTORY:  CAD S/P percutaneous coronary angioplasty 10/26/2020    Colonic mass     HTN (hypertension)     Ingrown toenail

## 2020-11-18 NOTE — H&P PST ADULT - NSICDXPROBLEM_GEN_ALL_CORE_FT
PROBLEM DIAGNOSES  Problem: Mass of colon  Assessment and Plan: Pt. is scheduled for a low anterior resection 11/19/20.  Pt. verbalized understanding of instructions and that Chlorhexidine is for external use.  Pt. had medical and cardiac clearance.  Pt. had COVID test.        PROBLEM DIAGNOSES  Problem: Mass of colon  Assessment and Plan: Pt. is scheduled for a robotic assisted low anterior resection flexible sigmoidosocpy 11/19/20.  Pt. verbalized understanding of instructions and that Chlorhexidine is for external use.  Pt. had medical and cardiac clearance.  Pt. had COVID test.        PROBLEM DIAGNOSES  Problem: Mass of colon  Assessment and Plan: Pt. is scheduled for a robotic assisted low anterior resection flexible sigmoidosocpy 11/19/20.  Pt. verbalized understanding of instructions and that Chlorhexidine is for external use.  Pt. had medical and cardiac clearance.  Pt. had COVID test.  Discussed with Dr. Begum.  Notified Madelyn in OR booking of SONJA precautions.

## 2020-11-18 NOTE — H&P PST ADULT - NSANTHOSAYNRD_GEN_A_CORE
No. SONJA screening performed.  STOP BANG Legend: 0-2 = LOW Risk; 3-4 = INTERMEDIATE Risk; 5-8 = HIGH Risk

## 2020-11-19 ENCOUNTER — INPATIENT (INPATIENT)
Facility: HOSPITAL | Age: 55
LOS: 0 days | Discharge: HOME | End: 2020-11-19
Attending: SURGERY | Admitting: SURGERY

## 2020-11-19 ENCOUNTER — INPATIENT (INPATIENT)
Facility: HOSPITAL | Age: 55
LOS: 7 days | Discharge: HOME | End: 2020-11-27
Attending: SURGERY | Admitting: SURGERY
Payer: MEDICAID

## 2020-11-19 VITALS
HEART RATE: 70 BPM | RESPIRATION RATE: 17 BRPM | TEMPERATURE: 98 F | DIASTOLIC BLOOD PRESSURE: 117 MMHG | HEIGHT: 67 IN | OXYGEN SATURATION: 100 % | SYSTOLIC BLOOD PRESSURE: 166 MMHG

## 2020-11-19 VITALS
SYSTOLIC BLOOD PRESSURE: 170 MMHG | RESPIRATION RATE: 16 BRPM | WEIGHT: 136.03 LBS | HEART RATE: 76 BPM | DIASTOLIC BLOOD PRESSURE: 107 MMHG | HEIGHT: 67 IN | TEMPERATURE: 98 F | OXYGEN SATURATION: 98 %

## 2020-11-19 DIAGNOSIS — K63.89 OTHER SPECIFIED DISEASES OF INTESTINE: ICD-10-CM

## 2020-11-19 PROBLEM — L60.0 INGROWING NAIL: Chronic | Status: ACTIVE | Noted: 2020-11-18

## 2020-11-19 LAB
ALBUMIN SERPL ELPH-MCNC: 4.2 G/DL — SIGNIFICANT CHANGE UP (ref 3.3–5)
ALP SERPL-CCNC: 125 U/L — HIGH (ref 40–120)
ALT FLD-CCNC: 14 U/L — SIGNIFICANT CHANGE UP (ref 4–41)
ANION GAP SERPL CALC-SCNC: 10 MMO/L — SIGNIFICANT CHANGE UP (ref 7–14)
ANISOCYTOSIS BLD QL: SLIGHT — SIGNIFICANT CHANGE UP
AST SERPL-CCNC: 22 U/L — SIGNIFICANT CHANGE UP (ref 4–40)
BASOPHILS # BLD AUTO: 0.05 K/UL — SIGNIFICANT CHANGE UP (ref 0–0.2)
BASOPHILS NFR BLD AUTO: 0.5 % — SIGNIFICANT CHANGE UP (ref 0–2)
BASOPHILS NFR SPEC: 0 % — SIGNIFICANT CHANGE UP (ref 0–2)
BILIRUB SERPL-MCNC: 0.5 MG/DL — SIGNIFICANT CHANGE UP (ref 0.2–1.2)
BUN SERPL-MCNC: 7 MG/DL — SIGNIFICANT CHANGE UP (ref 7–23)
CALCIUM SERPL-MCNC: 8.9 MG/DL — SIGNIFICANT CHANGE UP (ref 8.4–10.5)
CHLORIDE SERPL-SCNC: 109 MMOL/L — HIGH (ref 98–107)
CO2 SERPL-SCNC: 20 MMOL/L — LOW (ref 22–31)
CREAT SERPL-MCNC: 1.06 MG/DL — SIGNIFICANT CHANGE UP (ref 0.5–1.3)
EOSINOPHIL # BLD AUTO: 0.21 K/UL — SIGNIFICANT CHANGE UP (ref 0–0.5)
EOSINOPHIL NFR BLD AUTO: 2.2 % — SIGNIFICANT CHANGE UP (ref 0–6)
EOSINOPHIL NFR FLD: 0 % — SIGNIFICANT CHANGE UP (ref 0–6)
GIANT PLATELETS BLD QL SMEAR: PRESENT — SIGNIFICANT CHANGE UP
GLUCOSE SERPL-MCNC: 100 MG/DL — HIGH (ref 70–99)
HCT VFR BLD CALC: 37.5 % — LOW (ref 39–50)
HGB BLD-MCNC: 12.7 G/DL — LOW (ref 13–17)
HYPOCHROMIA BLD QL: SLIGHT — SIGNIFICANT CHANGE UP
IMM GRANULOCYTES NFR BLD AUTO: 0.4 % — SIGNIFICANT CHANGE UP (ref 0–1.5)
LYMPHOCYTES # BLD AUTO: 2.09 K/UL — SIGNIFICANT CHANGE UP (ref 1–3.3)
LYMPHOCYTES # BLD AUTO: 21.5 % — SIGNIFICANT CHANGE UP (ref 13–44)
LYMPHOCYTES NFR SPEC AUTO: 15 % — SIGNIFICANT CHANGE UP (ref 13–44)
MACROCYTES BLD QL: SLIGHT — SIGNIFICANT CHANGE UP
MCHC RBC-ENTMCNC: 29.3 PG — SIGNIFICANT CHANGE UP (ref 27–34)
MCHC RBC-ENTMCNC: 33.9 % — SIGNIFICANT CHANGE UP (ref 32–36)
MCV RBC AUTO: 86.6 FL — SIGNIFICANT CHANGE UP (ref 80–100)
MONOCYTES # BLD AUTO: 0.91 K/UL — HIGH (ref 0–0.9)
MONOCYTES NFR BLD AUTO: 9.4 % — SIGNIFICANT CHANGE UP (ref 2–14)
MONOCYTES NFR BLD: 9.7 % — HIGH (ref 2–9)
NEUTROPHIL AB SER-ACNC: 72.6 % — SIGNIFICANT CHANGE UP (ref 43–77)
NEUTROPHILS # BLD AUTO: 6.41 K/UL — SIGNIFICANT CHANGE UP (ref 1.8–7.4)
NEUTROPHILS NFR BLD AUTO: 66 % — SIGNIFICANT CHANGE UP (ref 43–77)
NEUTS BAND # BLD: 0.9 % — SIGNIFICANT CHANGE UP (ref 0–6)
NRBC # FLD: 0 K/UL — SIGNIFICANT CHANGE UP (ref 0–0)
OVALOCYTES BLD QL SMEAR: SLIGHT — SIGNIFICANT CHANGE UP
PLATELET # BLD AUTO: 265 K/UL — SIGNIFICANT CHANGE UP (ref 150–400)
PLATELET COUNT - ESTIMATE: NORMAL — SIGNIFICANT CHANGE UP
PMV BLD: 9.9 FL — SIGNIFICANT CHANGE UP (ref 7–13)
POIKILOCYTOSIS BLD QL AUTO: SLIGHT — SIGNIFICANT CHANGE UP
POLYCHROMASIA BLD QL SMEAR: SLIGHT — SIGNIFICANT CHANGE UP
POTASSIUM SERPL-MCNC: 4 MMOL/L — SIGNIFICANT CHANGE UP (ref 3.5–5.3)
POTASSIUM SERPL-SCNC: 4 MMOL/L — SIGNIFICANT CHANGE UP (ref 3.5–5.3)
PROT SERPL-MCNC: 6.3 G/DL — SIGNIFICANT CHANGE UP (ref 6–8.3)
RBC # BLD: 4.33 M/UL — SIGNIFICANT CHANGE UP (ref 4.2–5.8)
RBC # FLD: 13.7 % — SIGNIFICANT CHANGE UP (ref 10.3–14.5)
REVIEW TO FOLLOW: YES — SIGNIFICANT CHANGE UP
SARS-COV-2 RNA SPEC QL NAA+PROBE: SIGNIFICANT CHANGE UP
SMUDGE CELLS # BLD: PRESENT — SIGNIFICANT CHANGE UP
SODIUM SERPL-SCNC: 139 MMOL/L — SIGNIFICANT CHANGE UP (ref 135–145)
STOMATOCYTES BLD QL SMEAR: SLIGHT — SIGNIFICANT CHANGE UP
TARGETS BLD QL SMEAR: SLIGHT — SIGNIFICANT CHANGE UP
VARIANT LYMPHS # BLD: 1.8 % — SIGNIFICANT CHANGE UP
WBC # BLD: 9.71 K/UL — SIGNIFICANT CHANGE UP (ref 3.8–10.5)
WBC # FLD AUTO: 9.71 K/UL — SIGNIFICANT CHANGE UP (ref 3.8–10.5)

## 2020-11-19 PROCEDURE — 99223 1ST HOSP IP/OBS HIGH 75: CPT

## 2020-11-19 PROCEDURE — 99284 EMERGENCY DEPT VISIT MOD MDM: CPT

## 2020-11-19 RX ORDER — LOSARTAN POTASSIUM 100 MG/1
50 TABLET, FILM COATED ORAL ONCE
Refills: 0 | Status: COMPLETED | OUTPATIENT
Start: 2020-11-19 | End: 2020-11-19

## 2020-11-19 RX ORDER — HYDRALAZINE HCL 50 MG
10 TABLET ORAL ONCE
Refills: 0 | Status: DISCONTINUED | OUTPATIENT
Start: 2020-11-19 | End: 2020-11-19

## 2020-11-19 RX ORDER — DILTIAZEM HCL 120 MG
60 CAPSULE, EXT RELEASE 24 HR ORAL EVERY 6 HOURS
Refills: 0 | Status: DISCONTINUED | OUTPATIENT
Start: 2020-11-19 | End: 2020-11-22

## 2020-11-19 RX ORDER — ATORVASTATIN CALCIUM 80 MG/1
40 TABLET, FILM COATED ORAL AT BEDTIME
Refills: 0 | Status: DISCONTINUED | OUTPATIENT
Start: 2020-11-19 | End: 2020-11-20

## 2020-11-19 RX ORDER — HYDRALAZINE HCL 50 MG
10 TABLET ORAL ONCE
Refills: 0 | Status: COMPLETED | OUTPATIENT
Start: 2020-11-19 | End: 2020-11-19

## 2020-11-19 RX ORDER — LABETALOL HCL 100 MG
10 TABLET ORAL ONCE
Refills: 0 | Status: DISCONTINUED | OUTPATIENT
Start: 2020-11-19 | End: 2020-11-19

## 2020-11-19 RX ORDER — ENOXAPARIN SODIUM 100 MG/ML
40 INJECTION SUBCUTANEOUS DAILY
Refills: 0 | Status: DISCONTINUED | OUTPATIENT
Start: 2020-11-19 | End: 2020-11-27

## 2020-11-19 RX ORDER — METOPROLOL TARTRATE 50 MG
50 TABLET ORAL ONCE
Refills: 0 | Status: COMPLETED | OUTPATIENT
Start: 2020-11-19 | End: 2020-11-19

## 2020-11-19 RX ORDER — METOPROLOL TARTRATE 50 MG
25 TABLET ORAL
Refills: 0 | Status: DISCONTINUED | OUTPATIENT
Start: 2020-11-19 | End: 2020-11-19

## 2020-11-19 RX ORDER — LANOLIN ALCOHOL/MO/W.PET/CERES
3 CREAM (GRAM) TOPICAL AT BEDTIME
Refills: 0 | Status: DISCONTINUED | OUTPATIENT
Start: 2020-11-19 | End: 2020-11-27

## 2020-11-19 RX ORDER — LABETALOL HCL 100 MG
10 TABLET ORAL ONCE
Refills: 0 | Status: COMPLETED | OUTPATIENT
Start: 2020-11-19 | End: 2020-11-19

## 2020-11-19 RX ORDER — AMLODIPINE BESYLATE 2.5 MG/1
5 TABLET ORAL DAILY
Refills: 0 | Status: DISCONTINUED | OUTPATIENT
Start: 2020-11-19 | End: 2020-11-19

## 2020-11-19 RX ORDER — LOSARTAN POTASSIUM 100 MG/1
50 TABLET, FILM COATED ORAL DAILY
Refills: 0 | Status: DISCONTINUED | OUTPATIENT
Start: 2020-11-19 | End: 2020-11-25

## 2020-11-19 RX ADMIN — Medication 10 MILLIGRAM(S): at 16:44

## 2020-11-19 RX ADMIN — LOSARTAN POTASSIUM 50 MILLIGRAM(S): 100 TABLET, FILM COATED ORAL at 10:13

## 2020-11-19 RX ADMIN — Medication 10 MILLIGRAM(S): at 11:17

## 2020-11-19 RX ADMIN — ATORVASTATIN CALCIUM 40 MILLIGRAM(S): 80 TABLET, FILM COATED ORAL at 21:48

## 2020-11-19 RX ADMIN — Medication 10 MILLIGRAM(S): at 17:28

## 2020-11-19 RX ADMIN — Medication 10 MILLIGRAM(S): at 12:46

## 2020-11-19 RX ADMIN — Medication 50 MILLIGRAM(S): at 10:13

## 2020-11-19 RX ADMIN — Medication 60 MILLIGRAM(S): at 18:26

## 2020-11-19 RX ADMIN — ENOXAPARIN SODIUM 40 MILLIGRAM(S): 100 INJECTION SUBCUTANEOUS at 11:16

## 2020-11-19 RX ADMIN — LOSARTAN POTASSIUM 50 MILLIGRAM(S): 100 TABLET, FILM COATED ORAL at 16:44

## 2020-11-19 NOTE — CHART NOTE - NSCHARTNOTEFT_GEN_A_CORE
CAPRINI SCORE    AGE RELATED RISK FACTORS                                                             [x] Age 41-60 years                                            (1 Point)  [ ] Age: 61-74 years                                           (2 Points)                 [ ] Age= 75 years                                                (3 Points)             DISEASE RELATED RISK FACTORS                                                       [ ] Edema in the lower extremities                 (1 Point)                     [ ] Varicose veins                                               (1 Point)                                 [ ] BMI > 25 Kg/m2                                            (1 Point)                                  [ ] Serious infection (ie PNA)                            (1 Point)                     [ ] Lung disease ( COPD, Emphysema)            (1 Point)                                                                          [ ] Acute myocardial infarction                         (1 Point)                  [ ] Congestive heart failure (in the previous month)  (1 Point)         [ ] Inflammatory bowel disease                            (1 Point)                  [ ] Central venous access, PICC or Port               (2 points)       (within the last month)                                                                [ ] Stroke (in the previous month)                        (5 Points)    [x] Previous or present malignancy                       (2 points)                                                                                                                                                         HEMATOLOGY RELATED FACTORS                                                         [ ] Prior episodes of VTE                                     (3 Points)                     [ ] Positive family history for VTE                      (3 Points)                  [ ] Prothrombin 98739 A                                     (3 Points)                     [ ] Factor V Leiden                                                (3 Points)                        [ ] Lupus anticoagulants                                      (3 Points)                                                           [ ] Anticardiolipin antibodies                              (3 Points)                                                       [ ] High homocysteine in the blood                   (3 Points)                                             [ ] Other congenital or acquired thrombophilia      (3 Points)                                                [ ] Heparin induced thrombocytopenia                  (3 Points)                                        MOBILITY RELATED FACTORS  [ ] Bed rest                                                         (1 Point)  [ ] Plaster cast                                                    (2 points)  [ ] Bed bound for more than 72 hours           (2 Points)    GENDER SPECIFIC FACTORS  [ ] Pregnancy or had a baby within the last month   (1 Point)  [ ] Post-partum < 6 weeks                                   (1 Point)  [ ] Hormonal therapy  or oral contraception   (1 Point)  [ ] History of pregnancy complications              (1 point)  [ ] Unexplained or recurrent              (1 Point)    OTHER RISK FACTORS                                           (1 Point)  BMI >40, smoking, diabetes requiring insulin, chemotherapy  blood transfusions and length of surgery over 2 hours    SURGERY RELATED RISK FACTORS  [ ]  Section within the last month     (1 Point)  [ ] Minor surgery                                                  (1 Point)  [ ] Arthroscopic surgery                                       (2 Points)  [x] Planned major surgery lasting more            (2 Points)      than 45 minutes     [ ] Elective hip or knee joint replacement       (5 points)       surgery                                                TRAUMA RELATED RISK FACTORS  [ ] Fracture of the hip, pelvis, or leg                       (5 Points)  [ ] Spinal cord injury resulting in paralysis             (5 points)       (in the previous month)    [ ] Paralysis  (less than 1 month)                             (5 Points)  [ ] Multiple Trauma within 1 month                        (5 Points)    Total Score [   5     ]    Caprini Score 0-2: Low Risk, NO VTE prophylaxis required for most patients, encourage ambulation  Caprini Score 3-6: Moderate Risk , pharmacologic VTE prophylaxis is indicated for most patients (in the absence of contraindications)  Caprini Score Greater than or =7: High risk, pharmacologic VTE prophylaxis indicated for most patients (in the absence of contraindications)

## 2020-11-19 NOTE — ED PROVIDER NOTE - OBJECTIVE STATEMENT
56yo M hx htn, recent dx colonic mass planned for OR intervention sent from pre-op for asymptomatic hypertension to 160/120. Did not take his bp meds this AM. Otherwise no complaints. Per surgeon Dr. Bermudez plan for admission and OR on monday.

## 2020-11-19 NOTE — H&P ADULT - ASSESSMENT
55M hx of HTN, CAD presented for elective robotic assisted LAR with flex sigmoidoscopy today for colonic mass (adenomatous/dysplastic lesion seen on colonoscopy 10/12). Patient was brought to the OR and was found to have hypertensive urgency with diastolic BP in 120s. OR case was cancelled and patient was sent to the ED. Patient received home PO antihypertensives with persistent hypertension. Cardiology consult placed.    Plan:  -admit to Dr. Bermudez  -labetalol 10mg x 1 given in ED, reassess hypertension  -f/u cardiology recommendations (Dr. Bermudez already spoke to cardiology)  -plan for OR Monday for robotic LAR and flex sig, will need bowel prep on Sunday 11/22  -AM labs  -LVX for VTE ppx  -c/w metoprolol 25mg BID, losartan 50mg qd  -added atorvastatin 40mg daily    Discussed with Dr. Aidan Guadalupe PGY2  General Surgery    D TEAM SURGERY  [id number]

## 2020-11-19 NOTE — H&P ADULT - HISTORY OF PRESENT ILLNESS
General Surgery Consult  Surgical team: D TEAM SURGERY  Attending: Dr. Bermudez    HPI:  55M hx of HTN, CAD presented for elective robotic assisted LAR with flex sigmoidoscopy today for colonic mass (adenomatous/dysplastic lesion seen on colonoscopy 10/12). Patient was brought to the OR and was found to have hypertensive urgency with diastolic BP in 120s. OR case was cancelled and patient was sent to the ED. Patient received home PO antihypertensives with persistent hypertension. Cardiology consult placed. Recent diagnostic cardiac cath for cardiac clearance with Dr. Loza 10/26.    labetalol 10mg x 1 ordered to be given in ED for persistent hypertension 170s/120s    Patient is angry and upset, but verbalized understanding that the surgery was delayed in the interest of patient safety.    PAST MEDICAL HISTORY:  CAD S/P percutaneous coronary angioplasty    CAD (coronary artery disease)    Ingrown toenail    Colonic mass    No significant past medical history    HTN (hypertension)        PAST SURGICAL HISTORY:  No significant past surgical history    No significant past surgical history    Ingrowing toenail    Ingrowing left great toenail        MEDICATIONS:  enoxaparin Injectable 40 milliGRAM(s) SubCutaneous daily  labetalol Injectable 10 milliGRAM(s) IV Push once      ALLERGIES:  No Known Allergies      VITALS & I/Os:  Vital Signs Last 24 Hrs  T(C): 36.6 (19 Nov 2020 09:26), Max: 36.7 (19 Nov 2020 06:51)  T(F): 97.8 (19 Nov 2020 09:26), Max: 98.1 (19 Nov 2020 06:51)  HR: 85 (19 Nov 2020 09:52) (70 - 85)  BP: 166/137 (19 Nov 2020 09:52) (150/80 - 170/107)  BP(mean): 103 (18 Nov 2020 11:04) (103 - 103)  RR: 17 (19 Nov 2020 09:52) (16 - 20)  SpO2: 100% (19 Nov 2020 09:52) (98% - 100%)    I&O's Summary      PHYSICAL EXAM:  General: No acute distress  Respiratory: Nonlabored  Cardiovascular: RRR  Abdominal: Soft, nondistended, nontender. No rebound or guarding.   Extremities: Warm, moving all extremities    LABS:                        12.7   9.71  )-----------( 265      ( 19 Nov 2020 10:00 )             37.5     11-19    139  |  109<H>  |  7   ----------------------------<  100<H>  4.0   |  20<L>  |  1.06    Ca    8.9      19 Nov 2020 10:00    TPro  6.3  /  Alb  4.2  /  TBili  0.5  /  DBili  x   /  AST  22  /  ALT  14  /  AlkPhos  125<H>  11-19    Lactate:                  IMAGING:

## 2020-11-19 NOTE — CONSULT NOTE ADULT - ATTENDING COMMENTS
seen and agree w/ PA.  patient with newly diagnosed hypertension started on metoprolol and losartan. he presented for colon surgery, but deferred due to elevated BP in pre-op area.  asked to be admitted for stabilization prior to rescheduling for Monday.      Lynette stopped Metoprolol due to intolerable side effect of impotence.    Start Diltiazem q6hrs for CAD instead of Metoprolol, which I will adjust over the weekend and discharge on a long-acting formulation.  Continue Losartan.  Add Chlorthalidone.  Stop Amlodipine.  Add Lipitor.

## 2020-11-19 NOTE — H&P ADULT - NSICDXPASTMEDICALHX_GEN_ALL_CORE_FT
PAST MEDICAL HISTORY:  CAD S/P percutaneous coronary angioplasty 10/26/2020    Colonic mass     HTN (hypertension)     Ingrown toenail     Smoker

## 2020-11-19 NOTE — ED PROVIDER NOTE - ATTENDING CONTRIBUTION TO CARE
fausto: pt sent from pre-op, pt here with very poorly controlled htn, multiply turned away from or bc of same.  bp over 200 here poorly compliant with outpt meds as per surgical attending.  plan is to admit and get bp under control to optimize him for the or.  pt awake and alert, able to respond, bp 200's.    I performed a history and physical exam of the patient and discussed their management with the resident and /or advanced care provider. I reviewed the resident and /or ACP's note and agree with the documented findings and plan of care. My medical decison making and observations are found above.

## 2020-11-19 NOTE — ED ADULT NURSE NOTE - OBJECTIVE STATEMENT
Pt a&ox3, calm and cooperative, sent from OR due to hypertension. Pt admits to not taking HTN medication this am. Pt denies chest pain, sob, palpitations. Respirations even/unlabored, nad noted, arrived with 18g to left ac and 1L of LR infusing. MD Kahn at bedside to eval. will continue to monitor

## 2020-11-19 NOTE — ED PROVIDER NOTE - CLINICAL SUMMARY MEDICAL DECISION MAKING FREE TEXT BOX
Hx htn, colon mass sent from pre-op for asymptomatic hypertension. Exam unremarkable. BP control and plan for admission to surgery for OR on monday Hx htn, colon mass sent from pre-op for asymptomatic hypertension. Exam unremarkable. BP control and plan for admission to surgery for OR on monday    fausto: pt sent from pre-op, pt here with very poorly controlled htn, multiply turned away from or bc of same.  bp over 200 here poorly compliant with outpt meds as per surgical attending.  plan is to admit and get bp under control to optimize him for the or.  pt awake and alert, able to respond, bp 200's.

## 2020-11-19 NOTE — ED ADULT TRIAGE NOTE - CHIEF COMPLAINT QUOTE
Pt scheduled for bowel resection today. Pt brought to ED form OR due to hypertension. Pt denies HA, dizziness, cp. Pt arrives with 18g in L AC and NS

## 2020-11-19 NOTE — CONSULT NOTE ADULT - SUBJECTIVE AND OBJECTIVE BOX
HISTORY OF PRESENT ILLNESS: HPI:                                                    55M hx of HTN, tobacco abuse, CAD, s/p recent LHC 10/2020 at [facility] with 70% mRCA, medically managed for now, staged PCI planned post op, presented for elective robotic assisted LAR with flex sigmoidoscopy today for colonic mass (adenomatous/dysplastic lesion seen on colonoscopy 10/12). Patient was brought to the OR and was found to have hypertensive urgency with diastolic BP in 120s. OR case was cancelled and patient was sent to the ED. Patient received home PO antihypertensives with persistent hypertension.     Pt seen in ER.  Denies CP, SOB, palps.      PAST MEDICAL HISTORY:  CAD S/P percutaneous coronary angioplasty    CAD (coronary artery disease)    Ingrown toenail    Colonic mass    No significant past medical history    HTN (hypertension)      PAST SURGICAL HISTORY:  No significant past surgical history      MEDICATIONS  (STANDING):  amLODIPine   Tablet 5 milliGRAM(s) Oral daily  atorvastatin 40 milliGRAM(s) Oral at bedtime  enoxaparin Injectable 40 milliGRAM(s) SubCutaneous daily  hydrALAZINE Injectable 10 milliGRAM(s) IV Push once  losartan 50 milliGRAM(s) Oral daily  metoprolol tartrate 25 milliGRAM(s) Oral two times a day    Allergies  No Known Allergies      FAMILY HISTORY:  Noncontributory for premature coronary disease or sudden cardiac death    SOCIAL HISTORY:    [ x] Non-smoker  [ ] Smoker  [ ] Alcohol    FLU VACCINE THIS YEAR STARTS IN AUGUST:  [ ] Yes    [ ] No    IF OVER 65 HAVE YOU EVER HAD A PNA VACCINE:  [ ] Yes    [ ] No       [ ] N/A      REVIEW OF SYSTEMS:  [ ]chest pain  [  ]shortness of breath  [  ]palpitations  [  ]syncope  [ ]near syncope [ ]upper extremity weakness   [ ] lower extremity weakness  [  ]diplopia  [  ]altered mental status   [  ]fevers  [ ]chills [ ]nausea  [ ]vomitting  [  ]dysphagia    [ ]abdominal pain  [ ]melena  [ ]BRBPR    [  ]epistaxis  [  ]rash    [ ]lower extremity edema        [x] All others negative	  [ ] Unable to obtain      LABS:	 	                        12.7   9.71  )-----------( 265      ( 19 Nov 2020 10:00 )             37.5     139  |  109<H>  |  7   ----------------------------<  100<H>  4.0   |  20<L>  |  1.06    Ca    8.9      19 Nov 2020 10:00    TPro  6.3  /  Alb  4.2  /  TBili  0.5  /  DBili  x   /  AST  22  /  ALT  14  /  AlkPhos  125<H>  11-19    Creatinine Trend: 1.06<--, 1.09<--, 1.18<--    PHYSICAL EXAM:  T(C): 36.7 (11-19-20 @ 16:13), Max: 36.8 (11-19-20 @ 11:13)  HR: 82 (11-19-20 @ 16:13) (63 - 85)  BP: 174/112 (11-19-20 @ 16:13) (147/104 - 174/112)  RR: 18 (11-19-20 @ 16:13) (16 - 18)  SpO2: 100% (11-19-20 @ 16:13) (98% - 100%)  Wt(kg): --   BMI (kg/m2): 21.3 (11-19-20 @ 09:26)    Gen: Appears well in NAD  HEENT:  (-)icterus (-)pallor  CV: N S1 S2 1/6 ARYAN (+)2 Pulses B/l  Resp:  Clear to ausculatation B/L, normal effort  GI: (+) BS Soft, NT, ND  Lymph:  (-)Edema, (-)obvious lymphadenopathy  Skin: Warm to touch, Normal turgor  Psych: Appropriate mood and affect    EKG: NSR, voltage criteria for LVH    ASSESSMENT/PLAN: 	55M hx of HTN, tobacco abuse, CAD, s/p recent C 10/2020 at [facility] with 70% mRCA, medically managed for now, staged PCI planned post op, presented for elective robotic assisted LAR with flex sigmoidoscopy today for colonic mass, but OR canceled due to hypertensive urgency    --will optimize BP regimen  --D/C toprol  --start Cardizem 60 Q6, cont Cozaar  --add chlorthalidone tomorrow  --Given known CAD, resume Asa post op.  Start Lipitor 80mg  --smoking cessation

## 2020-11-20 LAB
ANION GAP SERPL CALC-SCNC: 13 MMO/L — SIGNIFICANT CHANGE UP (ref 7–14)
BUN SERPL-MCNC: 9 MG/DL — SIGNIFICANT CHANGE UP (ref 7–23)
CALCIUM SERPL-MCNC: 9.4 MG/DL — SIGNIFICANT CHANGE UP (ref 8.4–10.5)
CHLORIDE SERPL-SCNC: 104 MMOL/L — SIGNIFICANT CHANGE UP (ref 98–107)
CO2 SERPL-SCNC: 20 MMOL/L — LOW (ref 22–31)
CREAT SERPL-MCNC: 1.05 MG/DL — SIGNIFICANT CHANGE UP (ref 0.5–1.3)
GLUCOSE SERPL-MCNC: 93 MG/DL — SIGNIFICANT CHANGE UP (ref 70–99)
HCT VFR BLD CALC: 40.2 % — SIGNIFICANT CHANGE UP (ref 39–50)
HGB BLD-MCNC: 13.4 G/DL — SIGNIFICANT CHANGE UP (ref 13–17)
MAGNESIUM SERPL-MCNC: 2 MG/DL — SIGNIFICANT CHANGE UP (ref 1.6–2.6)
MCHC RBC-ENTMCNC: 29 PG — SIGNIFICANT CHANGE UP (ref 27–34)
MCHC RBC-ENTMCNC: 33.3 % — SIGNIFICANT CHANGE UP (ref 32–36)
MCV RBC AUTO: 87 FL — SIGNIFICANT CHANGE UP (ref 80–100)
NRBC # FLD: 0 K/UL — SIGNIFICANT CHANGE UP (ref 0–0)
PHOSPHATE SERPL-MCNC: 2.8 MG/DL — SIGNIFICANT CHANGE UP (ref 2.5–4.5)
PLATELET # BLD AUTO: 302 K/UL — SIGNIFICANT CHANGE UP (ref 150–400)
PMV BLD: 11 FL — SIGNIFICANT CHANGE UP (ref 7–13)
POTASSIUM SERPL-MCNC: 3.8 MMOL/L — SIGNIFICANT CHANGE UP (ref 3.5–5.3)
POTASSIUM SERPL-SCNC: 3.8 MMOL/L — SIGNIFICANT CHANGE UP (ref 3.5–5.3)
RBC # BLD: 4.62 M/UL — SIGNIFICANT CHANGE UP (ref 4.2–5.8)
RBC # FLD: 13.9 % — SIGNIFICANT CHANGE UP (ref 10.3–14.5)
SODIUM SERPL-SCNC: 137 MMOL/L — SIGNIFICANT CHANGE UP (ref 135–145)
WBC # BLD: 9.22 K/UL — SIGNIFICANT CHANGE UP (ref 3.8–10.5)
WBC # FLD AUTO: 9.22 K/UL — SIGNIFICANT CHANGE UP (ref 3.8–10.5)

## 2020-11-20 PROCEDURE — 99232 SBSQ HOSP IP/OBS MODERATE 35: CPT

## 2020-11-20 RX ORDER — ATORVASTATIN CALCIUM 80 MG/1
80 TABLET, FILM COATED ORAL AT BEDTIME
Refills: 0 | Status: DISCONTINUED | OUTPATIENT
Start: 2020-11-20 | End: 2020-11-27

## 2020-11-20 RX ORDER — SODIUM,POTASSIUM PHOSPHATES 278-250MG
1 POWDER IN PACKET (EA) ORAL ONCE
Refills: 0 | Status: COMPLETED | OUTPATIENT
Start: 2020-11-20 | End: 2020-11-20

## 2020-11-20 RX ORDER — CHLORTHALIDONE 50 MG
25 TABLET ORAL DAILY
Refills: 0 | Status: DISCONTINUED | OUTPATIENT
Start: 2020-11-20 | End: 2020-11-25

## 2020-11-20 RX ADMIN — Medication 1 TABLET(S): at 13:43

## 2020-11-20 RX ADMIN — Medication 60 MILLIGRAM(S): at 23:43

## 2020-11-20 RX ADMIN — Medication 3 MILLIGRAM(S): at 01:15

## 2020-11-20 RX ADMIN — Medication 60 MILLIGRAM(S): at 18:52

## 2020-11-20 RX ADMIN — Medication 60 MILLIGRAM(S): at 05:11

## 2020-11-20 RX ADMIN — Medication 60 MILLIGRAM(S): at 13:43

## 2020-11-20 RX ADMIN — LOSARTAN POTASSIUM 50 MILLIGRAM(S): 100 TABLET, FILM COATED ORAL at 05:10

## 2020-11-20 RX ADMIN — Medication 3 MILLIGRAM(S): at 23:43

## 2020-11-20 RX ADMIN — ATORVASTATIN CALCIUM 80 MILLIGRAM(S): 80 TABLET, FILM COATED ORAL at 23:43

## 2020-11-20 RX ADMIN — Medication 60 MILLIGRAM(S): at 01:15

## 2020-11-20 RX ADMIN — Medication 25 MILLIGRAM(S): at 12:49

## 2020-11-20 RX ADMIN — ENOXAPARIN SODIUM 40 MILLIGRAM(S): 100 INJECTION SUBCUTANEOUS at 12:50

## 2020-11-20 NOTE — PROGRESS NOTE ADULT - ASSESSMENT
55M hx of HTN, CAD presented for elective robotic assisted LAR with flex sigmoidoscopy today for colonic mass (adenomatous/dysplastic lesion seen on colonoscopy 10/12). Patient was brought to the OR and was found to have hypertensive urgency with diastolic BP in 120s. OR case was cancelled and patient was sent to the ED. anti-HTN meds adjusted per cardiology. Now better controlled.     Plan:  -plan for OR Monday for robotic LAR and flex sig, will need bowel prep on Sunday 11/22  -AM labs  -LVX for VTE ppx  -c/w cardizem 60mg q6h, losartan 50mg qd  - increasing atorvastatin to 80mg daily  - Will follow-up with cardiology for further recommendations     D TEAM  [id number]

## 2020-11-20 NOTE — PROGRESS NOTE ADULT - SUBJECTIVE AND OBJECTIVE BOX
chief complaint: colon mass    extended hpi: 55M hx of HTN, tobacco abuse, CAD, s/p recent LHC 10/2020 at [facility] with 70% mRCA, medically managed for now, staged PCI planned post op, presented for elective robotic assisted LAR with flex sigmoidoscopy today for colonic mass (adenomatous/dysplastic lesion seen on colonoscopy 10/12).    S: no chest pain or sob; ROS otherwise negative.     Review of Systems:   Constitutional: [ ] fevers, [ ] chills.   Skin: [ ] dry skin. [ ] rashes.  Psychiatric: [ ] depression, [ ] anxiety.   Gastrointestinal: [ ] BRBPR, [ ] melena.   Neurological: [ ] confusion. [ ] seizures. [ ] shuffling gait.   Ears,Nose,Mouth and Throat: [ ] ear pain [ ] sore throat.   Eyes: [ ] diplopia.   Respiratory: [ ] hemoptysis. [ ] shortness of breath  Cardiovascular: See HPI above  Hematologic/Lymphatic: [ ] anemia. [ ] painful nodes. [ ] prolonged bleeding.   Genitourinary: [ ] hematuria. [ ] flank pain.   Endocrine: [ ] significant change in weight. [ ] intolerance to heat and cold.     Review of systems [x ] otherwise negative, [ ] otherwise unable to obtain    FH: no family history of sudden cardiac death in first degree relatives    SH: [x ] tobacco, [ ] alcohol, [ ] drugs    atorvastatin 80 milliGRAM(s) Oral at bedtime  chlorthalidone 25 milliGRAM(s) Oral daily  diltiazem    Tablet 60 milliGRAM(s) Oral every 6 hours  enoxaparin Injectable 40 milliGRAM(s) SubCutaneous daily  losartan 50 milliGRAM(s) Oral daily  melatonin 3 milliGRAM(s) Oral at bedtime                            13.4   9.22  )-----------( 302      ( 20 Nov 2020 05:55 )             40.2       11-20    137  |  104  |  9   ----------------------------<  93  3.8   |  20<L>  |  1.05    Ca    9.4      20 Nov 2020 05:55  Phos  2.8     11-20  Mg     2.0     11-20    TPro  6.3  /  Alb  4.2  /  TBili  0.5  /  DBili  x   /  AST  22  /  ALT  14  /  AlkPhos  125<H>  11-19            T(C): 36.9 (11-20-20 @ 15:28), Max: 37.2 (11-20-20 @ 09:47)  HR: 71 (11-20-20 @ 15:28) (59 - 82)  BP: 139/99 (11-20-20 @ 15:28) (139/90 - 174/112)  RR: 17 (11-20-20 @ 15:28) (17 - 18)  SpO2: 100% (11-20-20 @ 15:28) (98% - 100%)  Wt(kg): --    I&O's Summary    19 Nov 2020 07:01  -  20 Nov 2020 07:00  --------------------------------------------------------  IN: 120 mL / OUT: 550 mL / NET: -430 mL        General: Well nourished in no acute distress. Alert and Oriented * 3.   Head: Normocephalic and atraumatic.   Neck: No JVD. No bruits. Supple. Does not appear to be enlarged.   Cardiovascular: + S1,S2 ; RRR Soft systolic murmur at the left lower sternal border. No rubs noted.    Lungs: CTA b/l. No rhonchi, rales or wheezes.   Abdomen: + BS, soft. Non tender. Non distended. No rebound. No guarding.   Extremities: No clubbing/cyanosis/edema.   Neurologic: Moves all four extremities. Full range of motion.   Skin: Warm and moist. The patient's skin has normal elasticity and good skin turgor.   Psychiatric: Appropriate mood and affect.  Musculoskeletal: Normal range of motion, normal strength      TELE: not on tele    A/P: 55M hx of HTN, tobacco abuse, CAD, s/p recent C 10/2020 at [facility] with 70% mRCA, medically managed for now, staged PCI planned post op, presented for elective robotic assisted LAR with flex sigmoidoscopy today for colonic mass (adenomatous/dysplastic lesion seen on colonoscopy 10/12).    -continue with medical management of known CAD  -pt. with no chest pain or anginal symptoms  -beta blockers changed to CCB due to side effects  -chlorthalidone added  -will continue with BP optimization through the weekend for OR on Monday  -continue with statin for CAD    Nga Delvalle MD

## 2020-11-20 NOTE — PROGRESS NOTE ADULT - SUBJECTIVE AND OBJECTIVE BOX
D TEAM SURGERY DAILY PROGRESS NOTE    Subjective: Patient seen and examined at bedside. Voices no complaints. Patient aware of plan for OR Monday.     O: Vital Signs Last 24 Hrs  T(C): 37 (20 Nov 2020 05:06), Max: 37.1 (20 Nov 2020 01:13)  T(F): 98.6 (20 Nov 2020 05:06), Max: 98.7 (20 Nov 2020 01:13)  HR: 68 (20 Nov 2020 05:06) (63 - 85)  BP: 142/93 (20 Nov 2020 05:06) (139/90 - 174/112)  BP(mean): --  RR: 18 (20 Nov 2020 05:06) (17 - 18)  SpO2: 99% (20 Nov 2020 05:06) (98% - 100%)    I&O's Detail    19 Nov 2020 07:01  -  20 Nov 2020 07:00  --------------------------------------------------------  IN:    Oral Fluid: 120 mL  Total IN: 120 mL    OUT:    Voided (mL): 550 mL  Total OUT: 550 mL    Total NET: -430 mL        General: alert and oriented, NAD  Resp: airway patent, respirations unlabored  CVS: regular rate and rhythm  Abdomen: soft, nontender, nondistended  Extremities: no edema  Skin: warm, dry, appropriate color        LABS                     13.4   9.22  )-----------( 302      ( 20 Nov 2020 05:55 )             40.2   11-20    137  |  104  |  9   ----------------------------<  93  3.8   |  20<L>  |  1.05    Ca    9.4      20 Nov 2020 05:55  Phos  2.8     11-20  Mg     2.0     11-20    TPro  6.3  /  Alb  4.2  /  TBili  0.5  /  DBili  x   /  AST  22  /  ALT  14  /  AlkPhos  125<H>  11-19

## 2020-11-20 NOTE — CHART NOTE - NSCHARTNOTEFT_GEN_A_CORE
CAPRINI SCORE    AGE RELATED RISK FACTORS                                                             [x] Age 41-60 years                                            (1 Point)  [ ] Age: 61-74 years                                           (2 Points)                 [ ] Age= 75 years                                                (3 Points)             DISEASE RELATED RISK FACTORS                                                       [ ] Edema in the lower extremities                 (1 Point)                     [ ] Varicose veins                                               (1 Point)                                 [ ] BMI > 25 Kg/m2                                            (1 Point)                                  [ ] Serious infection (ie PNA)                            (1 Point)                     [ ] Lung disease ( COPD, Emphysema)            (1 Point)                                                                          [ ] Acute myocardial infarction                         (1 Point)                  [ ] Congestive heart failure (in the previous month)  (1 Point)         [ ] Inflammatory bowel disease                            (1 Point)                  [ ] Central venous access, PICC or Port               (2 points)       (within the last month)                                                                [ ] Stroke (in the previous month)                        (5 Points)    [ ] Previous or present malignancy                       (2 points)                                                                                                                                                         HEMATOLOGY RELATED FACTORS                                                         [ ] Prior episodes of VTE                                     (3 Points)                     [ ] Positive family history for VTE                      (3 Points)                  [ ] Prothrombin 12062 A                                     (3 Points)                     [ ] Factor V Leiden                                                (3 Points)                        [ ] Lupus anticoagulants                                      (3 Points)                                                           [ ] Anticardiolipin antibodies                              (3 Points)                                                       [ ] High homocysteine in the blood                   (3 Points)                                             [ ] Other congenital or acquired thrombophilia      (3 Points)                                                [ ] Heparin induced thrombocytopenia                  (3 Points)                                        MOBILITY RELATED FACTORS  [ ] Bed rest                                                         (1 Point)  [ ] Plaster cast                                                    (2 points)  [ ] Bed bound for more than 72 hours           (2 Points)    GENDER SPECIFIC FACTORS  [ ] Pregnancy or had a baby within the last month   (1 Point)  [ ] Post-partum < 6 weeks                                   (1 Point)  [ ] Hormonal therapy  or oral contraception   (1 Point)  [ ] History of pregnancy complications              (1 point)  [ ] Unexplained or recurrent              (1 Point)    OTHER RISK FACTORS                                           (1 Point)  BMI >40, smoking, diabetes requiring insulin, chemotherapy  blood transfusions and length of surgery over 2 hours    SURGERY RELATED RISK FACTORS  [ ]  Section within the last month     (1 Point)  [ ] Minor surgery                                                  (1 Point)  [ ] Arthroscopic surgery                                       (2 Points)  [x] Planned major surgery lasting more            (2 Points)      than 45 minutes     [ ] Elective hip or knee joint replacement       (5 points)       surgery                                                TRAUMA RELATED RISK FACTORS  [ ] Fracture of the hip, pelvis, or leg                       (5 Points)  [ ] Spinal cord injury resulting in paralysis             (5 points)       (in the previous month)    [ ] Paralysis  (less than 1 month)                             (5 Points)  [ ] Multiple Trauma within 1 month                        (5 Points)    Total Score [        ]    Caprini Score 0-2: Low Risk, NO VTE prophylaxis required for most patients, encourage ambulation  Caprini Score 3-6: Moderate Risk , pharmacologic VTE prophylaxis is indicated for most patients (in the absence of contraindications)  Caprini Score Greater than or =7: High risk, pharmacologic VTE prophylaxis indicated for most patients (in the absence of contraindications) CAPRINI SCORE    AGE RELATED RISK FACTORS                                                             [x] Age 41-60 years                                            (1 Point)  [ ] Age: 61-74 years                                           (2 Points)                 [ ] Age= 75 years                                                (3 Points)             DISEASE RELATED RISK FACTORS                                                       [ ] Edema in the lower extremities                 (1 Point)                     [ ] Varicose veins                                               (1 Point)                                 [ ] BMI > 25 Kg/m2                                            (1 Point)                                  [ ] Serious infection (ie PNA)                            (1 Point)                     [ ] Lung disease ( COPD, Emphysema)            (1 Point)                                                                          [ ] Acute myocardial infarction                         (1 Point)                  [ ] Congestive heart failure (in the previous month)  (1 Point)         [ ] Inflammatory bowel disease                            (1 Point)                  [ ] Central venous access, PICC or Port               (2 points)       (within the last month)                                                                [ ] Stroke (in the previous month)                        (5 Points)    [x] Previous or present malignancy                       (2 points)                                                                                                                                                         HEMATOLOGY RELATED FACTORS                                                         [ ] Prior episodes of VTE                                     (3 Points)                     [ ] Positive family history for VTE                      (3 Points)                  [ ] Prothrombin 35634 A                                     (3 Points)                     [ ] Factor V Leiden                                                (3 Points)                        [ ] Lupus anticoagulants                                      (3 Points)                                                           [ ] Anticardiolipin antibodies                              (3 Points)                                                       [ ] High homocysteine in the blood                   (3 Points)                                             [ ] Other congenital or acquired thrombophilia      (3 Points)                                                [ ] Heparin induced thrombocytopenia                  (3 Points)                                        MOBILITY RELATED FACTORS  [ ] Bed rest                                                         (1 Point)  [ ] Plaster cast                                                    (2 points)  [ ] Bed bound for more than 72 hours           (2 Points)    GENDER SPECIFIC FACTORS  [ ] Pregnancy or had a baby within the last month   (1 Point)  [ ] Post-partum < 6 weeks                                   (1 Point)  [ ] Hormonal therapy  or oral contraception   (1 Point)  [ ] History of pregnancy complications              (1 point)  [ ] Unexplained or recurrent              (1 Point)    OTHER RISK FACTORS                                           (1 Point)  BMI >40, smoking, diabetes requiring insulin, chemotherapy  blood transfusions and length of surgery over 2 hours    SURGERY RELATED RISK FACTORS  [ ]  Section within the last month     (1 Point)  [ ] Minor surgery                                                  (1 Point)  [ ] Arthroscopic surgery                                       (2 Points)  [x] Planned major surgery lasting more            (2 Points)      than 45 minutes     [ ] Elective hip or knee joint replacement       (5 points)       surgery                                                TRAUMA RELATED RISK FACTORS  [ ] Fracture of the hip, pelvis, or leg                       (5 Points)  [ ] Spinal cord injury resulting in paralysis             (5 points)       (in the previous month)    [ ] Paralysis  (less than 1 month)                             (5 Points)  [ ] Multiple Trauma within 1 month                        (5 Points)    Total Score [  5   ]    Caprini Score 0-2: Low Risk, NO VTE prophylaxis required for most patients, encourage ambulation  Caprini Score 3-6: Moderate Risk , pharmacologic VTE prophylaxis is indicated for most patients (in the absence of contraindications)  Caprini Score Greater than or =7: High risk, pharmacologic VTE prophylaxis indicated for most patients (in the absence of contraindications)

## 2020-11-21 LAB
ANION GAP SERPL CALC-SCNC: 15 MMO/L — HIGH (ref 7–14)
BUN SERPL-MCNC: 12 MG/DL — SIGNIFICANT CHANGE UP (ref 7–23)
CALCIUM SERPL-MCNC: 9.8 MG/DL — SIGNIFICANT CHANGE UP (ref 8.4–10.5)
CHLORIDE SERPL-SCNC: 100 MMOL/L — SIGNIFICANT CHANGE UP (ref 98–107)
CO2 SERPL-SCNC: 19 MMOL/L — LOW (ref 22–31)
CREAT SERPL-MCNC: 1.08 MG/DL — SIGNIFICANT CHANGE UP (ref 0.5–1.3)
GLUCOSE SERPL-MCNC: 123 MG/DL — HIGH (ref 70–99)
HCT VFR BLD CALC: 40.9 % — SIGNIFICANT CHANGE UP (ref 39–50)
HGB BLD-MCNC: 13.9 G/DL — SIGNIFICANT CHANGE UP (ref 13–17)
MAGNESIUM SERPL-MCNC: 1.9 MG/DL — SIGNIFICANT CHANGE UP (ref 1.6–2.6)
MCHC RBC-ENTMCNC: 29.4 PG — SIGNIFICANT CHANGE UP (ref 27–34)
MCHC RBC-ENTMCNC: 34 % — SIGNIFICANT CHANGE UP (ref 32–36)
MCV RBC AUTO: 86.7 FL — SIGNIFICANT CHANGE UP (ref 80–100)
NRBC # FLD: 0 K/UL — SIGNIFICANT CHANGE UP (ref 0–0)
PHOSPHATE SERPL-MCNC: 3.2 MG/DL — SIGNIFICANT CHANGE UP (ref 2.5–4.5)
PLATELET # BLD AUTO: 290 K/UL — SIGNIFICANT CHANGE UP (ref 150–400)
PMV BLD: 11.1 FL — SIGNIFICANT CHANGE UP (ref 7–13)
POTASSIUM SERPL-MCNC: 3.7 MMOL/L — SIGNIFICANT CHANGE UP (ref 3.5–5.3)
POTASSIUM SERPL-SCNC: 3.7 MMOL/L — SIGNIFICANT CHANGE UP (ref 3.5–5.3)
RBC # BLD: 4.72 M/UL — SIGNIFICANT CHANGE UP (ref 4.2–5.8)
RBC # FLD: 13.7 % — SIGNIFICANT CHANGE UP (ref 10.3–14.5)
SODIUM SERPL-SCNC: 134 MMOL/L — LOW (ref 135–145)
WBC # BLD: 8.76 K/UL — SIGNIFICANT CHANGE UP (ref 3.8–10.5)
WBC # FLD AUTO: 8.76 K/UL — SIGNIFICANT CHANGE UP (ref 3.8–10.5)

## 2020-11-21 PROCEDURE — 99232 SBSQ HOSP IP/OBS MODERATE 35: CPT

## 2020-11-21 RX ORDER — POTASSIUM CHLORIDE 20 MEQ
20 PACKET (EA) ORAL ONCE
Refills: 0 | Status: COMPLETED | OUTPATIENT
Start: 2020-11-21 | End: 2020-11-21

## 2020-11-21 RX ADMIN — LOSARTAN POTASSIUM 50 MILLIGRAM(S): 100 TABLET, FILM COATED ORAL at 05:37

## 2020-11-21 RX ADMIN — Medication 20 MILLIEQUIVALENT(S): at 16:40

## 2020-11-21 RX ADMIN — Medication 60 MILLIGRAM(S): at 23:14

## 2020-11-21 RX ADMIN — Medication 3 MILLIGRAM(S): at 23:14

## 2020-11-21 RX ADMIN — Medication 60 MILLIGRAM(S): at 16:39

## 2020-11-21 RX ADMIN — Medication 60 MILLIGRAM(S): at 11:32

## 2020-11-21 RX ADMIN — ATORVASTATIN CALCIUM 80 MILLIGRAM(S): 80 TABLET, FILM COATED ORAL at 23:14

## 2020-11-21 RX ADMIN — Medication 60 MILLIGRAM(S): at 05:37

## 2020-11-21 RX ADMIN — ENOXAPARIN SODIUM 40 MILLIGRAM(S): 100 INJECTION SUBCUTANEOUS at 11:32

## 2020-11-21 RX ADMIN — Medication 25 MILLIGRAM(S): at 05:36

## 2020-11-21 NOTE — PROGRESS NOTE ADULT - ATTENDING COMMENTS
seen and agree w/ PA.  we may add Hydralazine tomorrow if diastolic remains over 100mmHg.  otherwise, he should be considered optimized for his surgery.  no mortality benefit to getting BP to a set target during the hospitalization, especially when the next intervention with general anesthesia can lower the BP further from his baseline.   as his BP is longstanding, he can leave the hospital after his surgery with an elevated / non-normotensive blood pressure for further work in the office setting.

## 2020-11-21 NOTE — PROGRESS NOTE ADULT - SUBJECTIVE AND OBJECTIVE BOX
chief complaint: colon mass    extended hpi: 55M hx of HTN, tobacco abuse, CAD, s/p recent LHC 10/2020 at [facility] with 70% mRCA, medically managed for now, staged PCI planned post op, presented for elective robotic assisted LAR with flex sigmoidoscopy today for colonic mass (adenomatous/dysplastic lesion seen on colonoscopy 10/12).    S: no chest pain or sob; ROS otherwise negative.     Review of Systems:   Constitutional: [ ] fevers, [ ] chills.   Skin: [ ] dry skin. [ ] rashes.  Psychiatric: [ ] depression, [ ] anxiety.   Gastrointestinal: [ ] BRBPR, [ ] melena.   Neurological: [ ] confusion. [ ] seizures. [ ] shuffling gait.   Ears,Nose,Mouth and Throat: [ ] ear pain [ ] sore throat.   Eyes: [ ] diplopia.   Respiratory: [ ] hemoptysis. [ ] shortness of breath  Cardiovascular: See HPI above  Hematologic/Lymphatic: [ ] anemia. [ ] painful nodes. [ ] prolonged bleeding.   Genitourinary: [ ] hematuria. [ ] flank pain.   Endocrine: [ ] significant change in weight. [ ] intolerance to heat and cold.     Review of systems [x ] otherwise negative, [ ] otherwise unable to obtain    FH: no family history of sudden cardiac death in first degree relatives    SH: [ ] tobacco, [ ] alcohol, [ ] drugs    atorvastatin 80 milliGRAM(s) Oral at bedtime  chlorthalidone 25 milliGRAM(s) Oral daily  diltiazem    Tablet 60 milliGRAM(s) Oral every 6 hours  enoxaparin Injectable 40 milliGRAM(s) SubCutaneous daily  losartan 50 milliGRAM(s) Oral daily  melatonin 3 milliGRAM(s) Oral at bedtime                      13.9   8.76  )-----------( 290      ( 21 Nov 2020 06:07 )             40.9     134<L>  |  100  |  12  ----------------------------<  123<H>  3.7   |  19<L>  |  1.08    Ca    9.8      21 Nov 2020 06:07  Phos  3.2     11-21  Mg     1.9     11-21      T(C): 36.8 (11-21-20 @ 07:55), Max: 37.1 (11-20-20 @ 23:40)  HR: 67 (11-21-20 @ 07:55) (62 - 77)  BP: 150/102 (11-21-20 @ 07:55) (139/99 - 153/115)  RR: 17 (11-21-20 @ 07:55) (17 - 18)  SpO2: 100% (11-21-20 @ 07:55) (100% - 100%)    General: Well nourished in no acute distress. Alert and Oriented * 3.   Head: Normocephalic and atraumatic.   Neck: No JVD. No bruits. Supple. Does not appear to be enlarged.   Cardiovascular: + S1,S2 ; RRR Soft systolic murmur at the left lower sternal border. No rubs noted.    Lungs: CTA b/l. No rhonchi, rales or wheezes.   Abdomen: + BS, soft. Non tender. Non distended. No rebound. No guarding.   Extremities: No clubbing/cyanosis/edema.   Neurologic: Moves all four extremities. Full range of motion.   Skin: Warm and moist. The patient's skin has normal elasticity and good skin turgor.   Psychiatric: Appropriate mood and affect.  Musculoskeletal: Normal range of motion, normal strength    TELE: not on tele    A/P: 55M hx of HTN, tobacco abuse, CAD, s/p recent C 10/2020 at [facility] with 70% mRCA, medically managed for now, staged PCI planned post op, presented for elective robotic assisted LAR with flex sigmoidoscopy today for colonic mass (adenomatous/dysplastic lesion seen on colonoscopy 10/12).    -continue with medical management of known CAD  -pt. with no chest pain or anginal symptoms  -beta blockers changed to CCB due to side effects  -chlorthalidone added  -will continue with BP optimization through the weekend for OR on Monday  -continue with statin for CAD  -IF DBP remains >100 today, increase Cardizem to 90 Q 6

## 2020-11-21 NOTE — PROGRESS NOTE ADULT - ASSESSMENT
55M hx of HTN, CAD presented for elective robotic assisted LAR with flex sigmoidoscopy today for colonic mass (adenomatous/dysplastic lesion seen on colonoscopy 10/12), however case cancelled given hypertensive urgency with diastolic BP in 120s. Patient admitted for BP control with plan for OR Monday.    Plan:  - OR Monday for robotic LAR and flex sig. Will need bowel prep on Sunday 11/22.  - Cards following for BP control. Per recs, continue chlorthalidone. If diastolics persistently > 100 today, can increase diltiazem to 90mg q6h.  - LVX for VTE ppx  - Atorvastatin to 80mg daily  - F/u AM labs  - OOB/ambulation    Mukul, PGY-1  D Team Surgery, [id number]

## 2020-11-21 NOTE — PROGRESS NOTE ADULT - SUBJECTIVE AND OBJECTIVE BOX
24hr events:  - Per cards, beta blocker switched to calcium channel blocker and chlorthalidone added.     Overnight events:   - No acute events    SUBJECTIVE:  Patient is very upset that he still hasn't had surgery and is frustrated that he has to wait. Otherwise denies chest pain, dizziness or SOB.     OBJECTIVE:  Vital Signs Last 24 Hrs  T(C): 37 (21 Nov 2020 11:30), Max: 37.1 (20 Nov 2020 23:40)  T(F): 98.6 (21 Nov 2020 11:30), Max: 98.7 (20 Nov 2020 23:40)  HR: 70 (21 Nov 2020 11:30) (67 - 77)  BP: 155/92 (21 Nov 2020 11:30) (139/99 - 155/92)  BP(mean): --  RR: 16 (21 Nov 2020 11:30) (16 - 18)  SpO2: 100% (21 Nov 2020 11:30) (100% - 100%)    Physical Examination:  GEN: NAD, resting quietly  PULM: symmetric chest rise bilaterally, no increased WOB  ABD: soft, nontender, nondistended, no rebound or guarding  EXTR: no LE erythema, moving all extremities      LABS:                        13.9   8.76  )-----------( 290      ( 21 Nov 2020 06:07 )             40.9       11-21    134<L>  |  100  |  12  ----------------------------<  123<H>  3.7   |  19<L>  |  1.08    Ca    9.8      21 Nov 2020 06:07  Phos  3.2     11-21  Mg     1.9     11-21

## 2020-11-22 ENCOUNTER — TRANSCRIPTION ENCOUNTER (OUTPATIENT)
Age: 55
End: 2020-11-22

## 2020-11-22 LAB
ANION GAP SERPL CALC-SCNC: 12 MMO/L — SIGNIFICANT CHANGE UP (ref 7–14)
BUN SERPL-MCNC: 19 MG/DL — SIGNIFICANT CHANGE UP (ref 7–23)
CALCIUM SERPL-MCNC: 9.7 MG/DL — SIGNIFICANT CHANGE UP (ref 8.4–10.5)
CHLORIDE SERPL-SCNC: 101 MMOL/L — SIGNIFICANT CHANGE UP (ref 98–107)
CO2 SERPL-SCNC: 23 MMOL/L — SIGNIFICANT CHANGE UP (ref 22–31)
CREAT SERPL-MCNC: 1.35 MG/DL — HIGH (ref 0.5–1.3)
GLUCOSE SERPL-MCNC: 102 MG/DL — HIGH (ref 70–99)
HCT VFR BLD CALC: 41.8 % — SIGNIFICANT CHANGE UP (ref 39–50)
HGB BLD-MCNC: 14.1 G/DL — SIGNIFICANT CHANGE UP (ref 13–17)
MAGNESIUM SERPL-MCNC: 1.9 MG/DL — SIGNIFICANT CHANGE UP (ref 1.6–2.6)
MCHC RBC-ENTMCNC: 29.3 PG — SIGNIFICANT CHANGE UP (ref 27–34)
MCHC RBC-ENTMCNC: 33.7 % — SIGNIFICANT CHANGE UP (ref 32–36)
MCV RBC AUTO: 86.9 FL — SIGNIFICANT CHANGE UP (ref 80–100)
NRBC # FLD: 0 K/UL — SIGNIFICANT CHANGE UP (ref 0–0)
PHOSPHATE SERPL-MCNC: 3.2 MG/DL — SIGNIFICANT CHANGE UP (ref 2.5–4.5)
PLATELET # BLD AUTO: 278 K/UL — SIGNIFICANT CHANGE UP (ref 150–400)
PMV BLD: 10.9 FL — SIGNIFICANT CHANGE UP (ref 7–13)
POTASSIUM SERPL-MCNC: 4 MMOL/L — SIGNIFICANT CHANGE UP (ref 3.5–5.3)
POTASSIUM SERPL-SCNC: 4 MMOL/L — SIGNIFICANT CHANGE UP (ref 3.5–5.3)
RBC # BLD: 4.81 M/UL — SIGNIFICANT CHANGE UP (ref 4.2–5.8)
RBC # FLD: 13.9 % — SIGNIFICANT CHANGE UP (ref 10.3–14.5)
SODIUM SERPL-SCNC: 136 MMOL/L — SIGNIFICANT CHANGE UP (ref 135–145)
WBC # BLD: 9.58 K/UL — SIGNIFICANT CHANGE UP (ref 3.8–10.5)
WBC # FLD AUTO: 9.58 K/UL — SIGNIFICANT CHANGE UP (ref 3.8–10.5)

## 2020-11-22 RX ORDER — DILTIAZEM HCL 120 MG
30 CAPSULE, EXT RELEASE 24 HR ORAL ONCE
Refills: 0 | Status: COMPLETED | OUTPATIENT
Start: 2020-11-22 | End: 2020-11-22

## 2020-11-22 RX ORDER — NICOTINE POLACRILEX 2 MG
1 GUM BUCCAL DAILY
Refills: 0 | Status: DISCONTINUED | OUTPATIENT
Start: 2020-11-22 | End: 2020-11-27

## 2020-11-22 RX ORDER — ACETAMINOPHEN 500 MG
650 TABLET ORAL EVERY 6 HOURS
Refills: 0 | Status: DISCONTINUED | OUTPATIENT
Start: 2020-11-22 | End: 2020-11-24

## 2020-11-22 RX ORDER — DILTIAZEM HCL 120 MG
90 CAPSULE, EXT RELEASE 24 HR ORAL EVERY 6 HOURS
Refills: 0 | Status: DISCONTINUED | OUTPATIENT
Start: 2020-11-22 | End: 2020-11-22

## 2020-11-22 RX ORDER — METRONIDAZOLE 500 MG
250 TABLET ORAL
Refills: 0 | Status: DISCONTINUED | OUTPATIENT
Start: 2020-11-22 | End: 2020-11-23

## 2020-11-22 RX ORDER — DEXTROSE MONOHYDRATE, SODIUM CHLORIDE, AND POTASSIUM CHLORIDE 50; .745; 4.5 G/1000ML; G/1000ML; G/1000ML
1000 INJECTION, SOLUTION INTRAVENOUS
Refills: 0 | Status: DISCONTINUED | OUTPATIENT
Start: 2020-11-22 | End: 2020-11-22

## 2020-11-22 RX ORDER — DEXTROSE MONOHYDRATE, SODIUM CHLORIDE, AND POTASSIUM CHLORIDE 50; .745; 4.5 G/1000ML; G/1000ML; G/1000ML
250 INJECTION, SOLUTION INTRAVENOUS
Refills: 0 | Status: DISCONTINUED | OUTPATIENT
Start: 2020-11-22 | End: 2020-11-22

## 2020-11-22 RX ORDER — DILTIAZEM HCL 120 MG
90 CAPSULE, EXT RELEASE 24 HR ORAL EVERY 6 HOURS
Refills: 0 | Status: DISCONTINUED | OUTPATIENT
Start: 2020-11-22 | End: 2020-11-23

## 2020-11-22 RX ORDER — DILTIAZEM HCL 120 MG
60 CAPSULE, EXT RELEASE 24 HR ORAL EVERY 6 HOURS
Refills: 0 | Status: DISCONTINUED | OUTPATIENT
Start: 2020-11-22 | End: 2020-11-22

## 2020-11-22 RX ORDER — POLYETHYLENE GLYCOL 3350 17 G/17G
17 POWDER, FOR SOLUTION ORAL
Refills: 0 | Status: DISCONTINUED | OUTPATIENT
Start: 2020-11-22 | End: 2020-11-22

## 2020-11-22 RX ORDER — NEOMYCIN SULFATE 500 MG/1
500 TABLET ORAL
Refills: 0 | Status: DISCONTINUED | OUTPATIENT
Start: 2020-11-22 | End: 2020-11-23

## 2020-11-22 RX ORDER — DEXTROSE MONOHYDRATE, SODIUM CHLORIDE, AND POTASSIUM CHLORIDE 50; .745; 4.5 G/1000ML; G/1000ML; G/1000ML
1000 INJECTION, SOLUTION INTRAVENOUS
Refills: 0 | Status: DISCONTINUED | OUTPATIENT
Start: 2020-11-22 | End: 2020-11-23

## 2020-11-22 RX ORDER — SOD SULF/SODIUM/NAHCO3/KCL/PEG
1000 SOLUTION, RECONSTITUTED, ORAL ORAL EVERY 4 HOURS
Refills: 0 | Status: COMPLETED | OUTPATIENT
Start: 2020-11-22 | End: 2020-11-22

## 2020-11-22 RX ADMIN — NEOMYCIN SULFATE 500 MILLIGRAM(S): 500 TABLET ORAL at 17:09

## 2020-11-22 RX ADMIN — Medication 650 MILLIGRAM(S): at 21:08

## 2020-11-22 RX ADMIN — ENOXAPARIN SODIUM 40 MILLIGRAM(S): 100 INJECTION SUBCUTANEOUS at 12:19

## 2020-11-22 RX ADMIN — Medication 1000 MILLILITER(S): at 17:09

## 2020-11-22 RX ADMIN — Medication 250 MILLIGRAM(S): at 12:19

## 2020-11-22 RX ADMIN — Medication 30 MILLIGRAM(S): at 21:31

## 2020-11-22 RX ADMIN — Medication 1000 MILLILITER(S): at 14:50

## 2020-11-22 RX ADMIN — Medication 250 MILLIGRAM(S): at 17:09

## 2020-11-22 RX ADMIN — NEOMYCIN SULFATE 500 MILLIGRAM(S): 500 TABLET ORAL at 12:19

## 2020-11-22 RX ADMIN — Medication 60 MILLIGRAM(S): at 17:09

## 2020-11-22 RX ADMIN — Medication 25 MILLIGRAM(S): at 05:08

## 2020-11-22 RX ADMIN — LOSARTAN POTASSIUM 50 MILLIGRAM(S): 100 TABLET, FILM COATED ORAL at 12:19

## 2020-11-22 RX ADMIN — DEXTROSE MONOHYDRATE, SODIUM CHLORIDE, AND POTASSIUM CHLORIDE 100 MILLILITER(S): 50; .745; 4.5 INJECTION, SOLUTION INTRAVENOUS at 20:42

## 2020-11-22 RX ADMIN — Medication 60 MILLIGRAM(S): at 12:19

## 2020-11-22 RX ADMIN — Medication 650 MILLIGRAM(S): at 20:38

## 2020-11-22 RX ADMIN — Medication 60 MILLIGRAM(S): at 05:08

## 2020-11-22 NOTE — CHART NOTE - NSCHARTNOTEFT_GEN_A_CORE
Preoperative Note    Preop Dx: colon mass  Surgeon: Dr. Bermudez  Procedure: robotic assisted lower anterior resection with flexible sigmoidoscopy     Vital Signs Last 24 Hrs  T(C): 37.1 (22 Nov 2020 19:27), Max: 37.2 (21 Nov 2020 23:13)  T(F): 98.8 (22 Nov 2020 19:27), Max: 99 (21 Nov 2020 23:13)  HR: 82 (22 Nov 2020 21:30) (73 - 96)  BP: 132/91 (22 Nov 2020 21:30) (130/102 - 147/101)  BP(mean): --  RR: 18 (22 Nov 2020 19:27) (16 - 18)  SpO2: 100% (22 Nov 2020 19:27) (100% - 100%)                        14.1   9.58  )-----------( 278      ( 22 Nov 2020 06:46 )             41.8     11-22    136  |  101  |  19  ----------------------------<  102<H>  4.0   |  23  |  1.35<H>    Ca    9.7      22 Nov 2020 06:46  Phos  3.2     11-22  Mg     1.9     11-22      EKG: ordered  CXR: ordered  Type and Screen: ordered 2x for AM labs  COVID negative 11/19        A/P: 55y Male with colon mass for OR tomorrow for robotic assisted lower anterior resection with flexible sigmoidoscopy.    - OR 11/23/20 for robotic assisted lower anterior resection with flexible sigmoidoscopy with Dr. Bermudez   - NPO past midnight, except medications  - IVF while NPO  - Morning Labs: CBC, BMP, coags, type & screen  - Consent signed and in chart  - Cardiac clearance for OR documented in chart    Marc, PGY-1  Surgery Team D, [id number]

## 2020-11-22 NOTE — PROGRESS NOTE ADULT - SUBJECTIVE AND OBJECTIVE BOX
Overnight events:   - No acute events    SUBJECTIVE:  Patient frustrated that he continues to wait for surgery. Denies abdominal pain, HA, dizziness or palpitations.    OBJECTIVE:  Vital Signs Last 24 Hrs  T(C): 37.2 (21 Nov 2020 23:13), Max: 37.2 (21 Nov 2020 23:13)  T(F): 99 (21 Nov 2020 23:13), Max: 99 (21 Nov 2020 23:13)  HR: 75 (21 Nov 2020 23:13) (67 - 80)  BP: 147/101 (21 Nov 2020 23:13) (130/92 - 155/92)  BP(mean): --  RR: 16 (21 Nov 2020 23:13) (16 - 18)  SpO2: 100% (21 Nov 2020 23:13) (100% - 100%)    Physical Examination:  GEN: NAD, resting quietly  PULM: symmetric chest rise bilaterally, no increased WOB  ABD: soft, nontender, nondistended, no rebound or guarding  EXTR: no LE erythema, moving all extremities      LABS:                        13.9   8.76  )-----------( 290      ( 21 Nov 2020 06:07 )             40.9       11-21    134<L>  |  100  |  12  ----------------------------<  123<H>  3.7   |  19<L>  |  1.08    Ca    9.8      21 Nov 2020 06:07  Phos  3.2     11-21  Mg     1.9     11-21

## 2020-11-22 NOTE — PROGRESS NOTE ADULT - ASSESSMENT
55M hx of HTN, CAD presented for elective robotic assisted LAR with flex sigmoidoscopy on 11/19 for colonic mass (adenomatous/dysplastic lesion seen on colonoscopy 10/12), however case cancelled given hypertensive urgency with diastolic BP in 120s. Patient admitted for BP control with plan for OR Monday. BP control improving.    Plan:  - OR tomorrow for robotic LAR and flex sig  - Bowel prep today  - Cards following for BP control. Per recs, continue chlorthalidone. If diastolics persistently > 100, can increase diltiazem to 90mg q6h.  - LVX for VTE ppx  - Atorvastatin to 80mg daily  - F/u AM labs  - OOB/ambulation    D Team Surgery, [id number]

## 2020-11-22 NOTE — PROGRESS NOTE ADULT - SUBJECTIVE AND OBJECTIVE BOX
pt seen and examined, no complaints         atorvastatin 80 milliGRAM(s) Oral at bedtime  chlorthalidone 25 milliGRAM(s) Oral daily  diltiazem    Tablet 60 milliGRAM(s) Oral every 6 hours  enoxaparin Injectable 40 milliGRAM(s) SubCutaneous daily  losartan 50 milliGRAM(s) Oral daily  melatonin 3 milliGRAM(s) Oral at bedtime                            14.1   9.58  )-----------( 278      ( 22 Nov 2020 06:46 )             41.8       Hemoglobin: 14.1 g/dL (11-22 @ 06:46)  Hemoglobin: 13.9 g/dL (11-21 @ 06:07)  Hemoglobin: 13.4 g/dL (11-20 @ 05:55)  Hemoglobin: 12.7 g/dL (11-19 @ 10:00)  Hemoglobin: 12.6 g/dL (11-18 @ 11:55)      11-22    136  |  101  |  19  ----------------------------<  102<H>  4.0   |  23  |  1.35<H>    Ca    9.7      22 Nov 2020 06:46  Phos  3.2     11-22  Mg     1.9     11-22      Creatinine Trend: 1.35<--, 1.08<--, 1.05<--, 1.06<--, 1.09<--, 1.18<--    COAGS:           T(C): 37 (11-22-20 @ 08:24), Max: 37.2 (11-21-20 @ 23:13)  HR: 83 (11-22-20 @ 08:24) (70 - 83)  BP: 146/93 (11-22-20 @ 08:24) (130/92 - 155/92)  RR: 18 (11-22-20 @ 08:24) (16 - 18)  SpO2: 100% (11-22-20 @ 08:24) (100% - 100%)  Wt(kg): --    I&O's Summary    21 Nov 2020 07:01  -  22 Nov 2020 07:00  --------------------------------------------------------  IN: 720 mL / OUT: 750 mL / NET: -30 mL      General: Well nourished in no acute distress. Alert and Oriented * 3.   Head: Normocephalic and atraumatic.   Neck: No JVD. No bruits. Supple. Does not appear to be enlarged.   Cardiovascular: + S1,S2 ; RRR Soft systolic murmur at the left lower sternal border. No rubs noted.    Lungs: CTA b/l. No rhonchi, rales or wheezes.   Abdomen: + BS, soft. Non tender. Non distended. No rebound. No guarding.   Extremities: No clubbing/cyanosis/edema.   Neurologic: Moves all four extremities. Full range of motion.   Skin: Warm and moist. The patient's skin has normal elasticity and good skin turgor.   Psychiatric: Appropriate mood and affect.  Musculoskeletal: Normal range of motion, normal strength    TELE: not on tele    A/P: 55M hx of HTN, tobacco abuse, CAD, s/p recent C 10/2020 at [facility] with 70% mRCA, medically managed for now, staged PCI planned post op, presented for elective robotic assisted LAR with flex sigmoidoscopy today for colonic mass (adenomatous/dysplastic lesion seen on colonoscopy 10/12).    -continue with medical management of known CAD  -pt. with no chest pain or anginal symptoms  - Cont CCB, increase as BP allow , Cardizem to 90 Q 6  - will continue with BP optimization through the weekend for OR  tomorrow   - continue with statin for CAD

## 2020-11-22 NOTE — PROVIDER CONTACT NOTE (OTHER) - ASSESSMENT
patient A&Ox4, VS stable except patient hypertensive, asymptomatic, denies chest pain, palpitations, SOB, n/v/dizziness, headache, or pain at this time. patient received blood pressure diltiazem medication as ordered. safety maintained. will continue to monitor

## 2020-11-22 NOTE — PROGRESS NOTE ADULT - ATTENDING COMMENTS
seen and agree w/ PA.  after making antihypertensive adjustments on day of admission, no further emergency BP readings.  still has diastolics >90mmHg, but this is improved from DBP>120mmHg. he is not having any headaches/confusion, blurry vision, angina or kidney injury due to his blood pressure.  this will take months to slowly bring down in the office setting  from a CV perspective, he is stable and optimized for colon surgery tomorrow.  any add'l fine-tuning of BP for perioperative care may be done with intravenous medications in the ICU setting, but would recommend restarting oral antihypertensives as soon as possible post-op.

## 2020-11-22 NOTE — PROVIDER CONTACT NOTE (OTHER) - ASSESSMENT
Patient is alert and oriented. Patient is asymptomatic. Blood pressure 146/124, heart rate 82 beats per minute.

## 2020-11-22 NOTE — CHART NOTE - NSCHARTNOTEFT_GEN_A_CORE
Provider was notified that patient's diastolic BP was in the 120s. Patient was seen and examined at bedside. Repeat vitals were taken at bedside, he was comfortable in his chair, /124 HR 82 and he was afebrile. He was mentating well, he was angry at not getting a regular diet today and his bowel prep, and insisted that he would like to go outside for air. He had no chest pain, dizziness, difficulty breathing, SOB, or any chest discomfort. He also endorsed some abdominal pain. His abdomen was soft, nondistended and mild tenderness in the LUQ. He denied any n/v/f/c.    Patient was educated on pain control, importance of his blood pressure control, and necessity of his bowel regimen.  He agreed to take his prescribed Tylenol that he had previously refused.     Plan:  - EKG   - giving stat cardizem 30mg dose now  - following cardiology recs and increasing cardizem to 90mg q6  - nicotine patch since patient smokes half a pack  - will monitor vital signs      D Team Surgery [id number]

## 2020-11-23 ENCOUNTER — APPOINTMENT (OUTPATIENT)
Dept: SURGICAL ONCOLOGY | Facility: HOSPITAL | Age: 55
End: 2020-11-23

## 2020-11-23 ENCOUNTER — RESULT REVIEW (OUTPATIENT)
Age: 55
End: 2020-11-23

## 2020-11-23 LAB
ANION GAP SERPL CALC-SCNC: 11 MMO/L — SIGNIFICANT CHANGE UP (ref 7–14)
ANION GAP SERPL CALC-SCNC: 9 MMO/L — SIGNIFICANT CHANGE UP (ref 7–14)
APTT BLD: 49.7 SEC — HIGH (ref 27–36.3)
BASE EXCESS BLDA CALC-SCNC: -4.9 MMOL/L — SIGNIFICANT CHANGE UP
BLD GP AB SCN SERPL QL: NEGATIVE — SIGNIFICANT CHANGE UP
BUN SERPL-MCNC: 15 MG/DL — SIGNIFICANT CHANGE UP (ref 7–23)
BUN SERPL-MCNC: 17 MG/DL — SIGNIFICANT CHANGE UP (ref 7–23)
CA-I BLDA-SCNC: 1.15 MMOL/L — SIGNIFICANT CHANGE UP (ref 1.15–1.29)
CALCIUM SERPL-MCNC: 8.9 MG/DL — SIGNIFICANT CHANGE UP (ref 8.4–10.5)
CALCIUM SERPL-MCNC: 8.9 MG/DL — SIGNIFICANT CHANGE UP (ref 8.4–10.5)
CHLORIDE SERPL-SCNC: 101 MMOL/L — SIGNIFICANT CHANGE UP (ref 98–107)
CHLORIDE SERPL-SCNC: 103 MMOL/L — SIGNIFICANT CHANGE UP (ref 98–107)
CO2 SERPL-SCNC: 22 MMOL/L — SIGNIFICANT CHANGE UP (ref 22–31)
CO2 SERPL-SCNC: 22 MMOL/L — SIGNIFICANT CHANGE UP (ref 22–31)
CREAT SERPL-MCNC: 1.38 MG/DL — HIGH (ref 0.5–1.3)
CREAT SERPL-MCNC: 1.46 MG/DL — HIGH (ref 0.5–1.3)
GLUCOSE BLDA-MCNC: 119 MG/DL — HIGH (ref 70–99)
GLUCOSE SERPL-MCNC: 147 MG/DL — HIGH (ref 70–99)
GLUCOSE SERPL-MCNC: 284 MG/DL — HIGH (ref 70–99)
HCO3 BLDA-SCNC: 21 MMOL/L — LOW (ref 22–26)
HCT VFR BLD CALC: 33.3 % — LOW (ref 39–50)
HCT VFR BLD CALC: 38.8 % — LOW (ref 39–50)
HCT VFR BLDA CALC: 35.7 % — LOW (ref 39–51)
HGB BLD-MCNC: 11.5 G/DL — LOW (ref 13–17)
HGB BLD-MCNC: 12.8 G/DL — LOW (ref 13–17)
HGB BLDA-MCNC: 11.6 G/DL — LOW (ref 13–17)
INR BLD: 1.11 — SIGNIFICANT CHANGE UP (ref 0.88–1.16)
MAGNESIUM SERPL-MCNC: 1.5 MG/DL — LOW (ref 1.6–2.6)
MAGNESIUM SERPL-MCNC: 1.8 MG/DL — SIGNIFICANT CHANGE UP (ref 1.6–2.6)
MCHC RBC-ENTMCNC: 29.2 PG — SIGNIFICANT CHANGE UP (ref 27–34)
MCHC RBC-ENTMCNC: 30.1 PG — SIGNIFICANT CHANGE UP (ref 27–34)
MCHC RBC-ENTMCNC: 33 % — SIGNIFICANT CHANGE UP (ref 32–36)
MCHC RBC-ENTMCNC: 34.5 % — SIGNIFICANT CHANGE UP (ref 32–36)
MCV RBC AUTO: 87.2 FL — SIGNIFICANT CHANGE UP (ref 80–100)
MCV RBC AUTO: 88.6 FL — SIGNIFICANT CHANGE UP (ref 80–100)
NRBC # FLD: 0 K/UL — SIGNIFICANT CHANGE UP (ref 0–0)
NRBC # FLD: 0 K/UL — SIGNIFICANT CHANGE UP (ref 0–0)
PCO2 BLDA: 33 MMHG — LOW (ref 35–48)
PH BLDA: 7.38 PH — SIGNIFICANT CHANGE UP (ref 7.35–7.45)
PHOSPHATE SERPL-MCNC: 3 MG/DL — SIGNIFICANT CHANGE UP (ref 2.5–4.5)
PHOSPHATE SERPL-MCNC: 3.3 MG/DL — SIGNIFICANT CHANGE UP (ref 2.5–4.5)
PLATELET # BLD AUTO: 213 K/UL — SIGNIFICANT CHANGE UP (ref 150–400)
PLATELET # BLD AUTO: 237 K/UL — SIGNIFICANT CHANGE UP (ref 150–400)
PMV BLD: 10.4 FL — SIGNIFICANT CHANGE UP (ref 7–13)
PMV BLD: 11 FL — SIGNIFICANT CHANGE UP (ref 7–13)
PO2 BLDA: 412 MMHG — HIGH (ref 83–108)
POTASSIUM BLDA-SCNC: 3.3 MMOL/L — LOW (ref 3.4–4.5)
POTASSIUM SERPL-MCNC: 4.3 MMOL/L — SIGNIFICANT CHANGE UP (ref 3.5–5.3)
POTASSIUM SERPL-MCNC: 4.9 MMOL/L — SIGNIFICANT CHANGE UP (ref 3.5–5.3)
POTASSIUM SERPL-SCNC: 4.3 MMOL/L — SIGNIFICANT CHANGE UP (ref 3.5–5.3)
POTASSIUM SERPL-SCNC: 4.9 MMOL/L — SIGNIFICANT CHANGE UP (ref 3.5–5.3)
PROTHROM AB SERPL-ACNC: 12.6 SEC — SIGNIFICANT CHANGE UP (ref 10.6–13.6)
RBC # BLD: 3.82 M/UL — LOW (ref 4.2–5.8)
RBC # BLD: 4.38 M/UL — SIGNIFICANT CHANGE UP (ref 4.2–5.8)
RBC # FLD: 13.5 % — SIGNIFICANT CHANGE UP (ref 10.3–14.5)
RBC # FLD: 13.5 % — SIGNIFICANT CHANGE UP (ref 10.3–14.5)
RH IG SCN BLD-IMP: POSITIVE — SIGNIFICANT CHANGE UP
SAO2 % BLDA: 99.7 % — HIGH (ref 95–99)
SODIUM BLDA-SCNC: 134 MMOL/L — LOW (ref 136–146)
SODIUM SERPL-SCNC: 132 MMOL/L — LOW (ref 135–145)
SODIUM SERPL-SCNC: 136 MMOL/L — SIGNIFICANT CHANGE UP (ref 135–145)
WBC # BLD: 12.99 K/UL — HIGH (ref 3.8–10.5)
WBC # BLD: 8.07 K/UL — SIGNIFICANT CHANGE UP (ref 3.8–10.5)
WBC # FLD AUTO: 12.99 K/UL — HIGH (ref 3.8–10.5)
WBC # FLD AUTO: 8.07 K/UL — SIGNIFICANT CHANGE UP (ref 3.8–10.5)

## 2020-11-23 PROCEDURE — 49905 OMENTAL FLAP INTRA-ABDOM: CPT

## 2020-11-23 PROCEDURE — 44213 LAP MOBIL SPLENIC FL ADD-ON: CPT

## 2020-11-23 PROCEDURE — 71045 X-RAY EXAM CHEST 1 VIEW: CPT | Mod: 26

## 2020-11-23 PROCEDURE — 88309 TISSUE EXAM BY PATHOLOGIST: CPT | Mod: 26

## 2020-11-23 PROCEDURE — 44207 L COLECTOMY/COLOPROCTOSTOMY: CPT

## 2020-11-23 PROCEDURE — S2900 ROBOTIC SURGICAL SYSTEM: CPT | Mod: NC

## 2020-11-23 RX ORDER — CEFAZOLIN SODIUM 1 G
VIAL (EA) INJECTION
Refills: 0 | Status: DISCONTINUED | OUTPATIENT
Start: 2020-11-23 | End: 2020-11-24

## 2020-11-23 RX ORDER — DILTIAZEM HCL 120 MG
60 CAPSULE, EXT RELEASE 24 HR ORAL EVERY 6 HOURS
Refills: 0 | Status: DISCONTINUED | OUTPATIENT
Start: 2020-11-23 | End: 2020-11-24

## 2020-11-23 RX ORDER — OXYCODONE HYDROCHLORIDE 5 MG/1
5 TABLET ORAL EVERY 4 HOURS
Refills: 0 | Status: DISCONTINUED | OUTPATIENT
Start: 2020-11-23 | End: 2020-11-23

## 2020-11-23 RX ORDER — HYDROMORPHONE HYDROCHLORIDE 2 MG/ML
0.5 INJECTION INTRAMUSCULAR; INTRAVENOUS; SUBCUTANEOUS EVERY 6 HOURS
Refills: 0 | Status: DISCONTINUED | OUTPATIENT
Start: 2020-11-23 | End: 2020-11-24

## 2020-11-23 RX ORDER — OXYCODONE HYDROCHLORIDE 5 MG/1
10 TABLET ORAL EVERY 4 HOURS
Refills: 0 | Status: DISCONTINUED | OUTPATIENT
Start: 2020-11-23 | End: 2020-11-27

## 2020-11-23 RX ORDER — ONDANSETRON 8 MG/1
4 TABLET, FILM COATED ORAL ONCE
Refills: 0 | Status: DISCONTINUED | OUTPATIENT
Start: 2020-11-23 | End: 2020-11-23

## 2020-11-23 RX ORDER — MAGNESIUM SULFATE 500 MG/ML
2 VIAL (ML) INJECTION ONCE
Refills: 0 | Status: COMPLETED | OUTPATIENT
Start: 2020-11-23 | End: 2020-11-23

## 2020-11-23 RX ORDER — KETOROLAC TROMETHAMINE 30 MG/ML
15 SYRINGE (ML) INJECTION ONCE
Refills: 0 | Status: DISCONTINUED | OUTPATIENT
Start: 2020-11-23 | End: 2020-11-23

## 2020-11-23 RX ORDER — OXYCODONE HYDROCHLORIDE 5 MG/1
2.5 TABLET ORAL EVERY 4 HOURS
Refills: 0 | Status: DISCONTINUED | OUTPATIENT
Start: 2020-11-23 | End: 2020-11-23

## 2020-11-23 RX ORDER — SODIUM CHLORIDE 9 MG/ML
1000 INJECTION, SOLUTION INTRAVENOUS
Refills: 0 | Status: DISCONTINUED | OUTPATIENT
Start: 2020-11-23 | End: 2020-11-24

## 2020-11-23 RX ORDER — HYDROMORPHONE HYDROCHLORIDE 2 MG/ML
0.5 INJECTION INTRAMUSCULAR; INTRAVENOUS; SUBCUTANEOUS
Refills: 0 | Status: DISCONTINUED | OUTPATIENT
Start: 2020-11-23 | End: 2020-11-23

## 2020-11-23 RX ORDER — ACETAMINOPHEN 500 MG
1000 TABLET ORAL EVERY 6 HOURS
Refills: 0 | Status: COMPLETED | OUTPATIENT
Start: 2020-11-23 | End: 2020-11-24

## 2020-11-23 RX ORDER — DILTIAZEM HCL 120 MG
60 CAPSULE, EXT RELEASE 24 HR ORAL ONCE
Refills: 0 | Status: COMPLETED | OUTPATIENT
Start: 2020-11-23 | End: 2020-11-23

## 2020-11-23 RX ORDER — OXYCODONE HYDROCHLORIDE 5 MG/1
5 TABLET ORAL EVERY 4 HOURS
Refills: 0 | Status: DISCONTINUED | OUTPATIENT
Start: 2020-11-23 | End: 2020-11-27

## 2020-11-23 RX ORDER — MAGNESIUM SULFATE 500 MG/ML
2 VIAL (ML) INJECTION ONCE
Refills: 0 | Status: COMPLETED | OUTPATIENT
Start: 2020-11-23 | End: 2020-11-24

## 2020-11-23 RX ORDER — LANOLIN ALCOHOL/MO/W.PET/CERES
3 CREAM (GRAM) TOPICAL ONCE
Refills: 0 | Status: COMPLETED | OUTPATIENT
Start: 2020-11-23 | End: 2020-11-23

## 2020-11-23 RX ORDER — HYDROMORPHONE HYDROCHLORIDE 2 MG/ML
1 INJECTION INTRAMUSCULAR; INTRAVENOUS; SUBCUTANEOUS
Refills: 0 | Status: DISCONTINUED | OUTPATIENT
Start: 2020-11-23 | End: 2020-11-23

## 2020-11-23 RX ORDER — OXYCODONE HYDROCHLORIDE 5 MG/1
10 TABLET ORAL ONCE
Refills: 0 | Status: DISCONTINUED | OUTPATIENT
Start: 2020-11-23 | End: 2020-11-23

## 2020-11-23 RX ORDER — DILTIAZEM HCL 120 MG
60 CAPSULE, EXT RELEASE 24 HR ORAL EVERY 6 HOURS
Refills: 0 | Status: DISCONTINUED | OUTPATIENT
Start: 2020-11-23 | End: 2020-11-23

## 2020-11-23 RX ADMIN — ATORVASTATIN CALCIUM 80 MILLIGRAM(S): 80 TABLET, FILM COATED ORAL at 22:20

## 2020-11-23 RX ADMIN — Medication 15 MILLIGRAM(S): at 23:58

## 2020-11-23 RX ADMIN — SODIUM CHLORIDE 84 MILLILITER(S): 9 INJECTION, SOLUTION INTRAVENOUS at 22:20

## 2020-11-23 RX ADMIN — Medication 60 MILLIGRAM(S): at 16:39

## 2020-11-23 RX ADMIN — Medication 3 MILLIGRAM(S): at 22:20

## 2020-11-23 RX ADMIN — LOSARTAN POTASSIUM 50 MILLIGRAM(S): 100 TABLET, FILM COATED ORAL at 06:01

## 2020-11-23 RX ADMIN — Medication 50 GRAM(S): at 22:19

## 2020-11-23 RX ADMIN — OXYCODONE HYDROCHLORIDE 2.5 MILLIGRAM(S): 5 TABLET ORAL at 22:21

## 2020-11-23 RX ADMIN — Medication 400 MILLIGRAM(S): at 23:58

## 2020-11-23 RX ADMIN — Medication 250 MILLIGRAM(S): at 00:09

## 2020-11-23 RX ADMIN — Medication 25 MILLIGRAM(S): at 06:01

## 2020-11-23 RX ADMIN — HYDROMORPHONE HYDROCHLORIDE 1 MILLIGRAM(S): 2 INJECTION INTRAMUSCULAR; INTRAVENOUS; SUBCUTANEOUS at 16:35

## 2020-11-23 RX ADMIN — ATORVASTATIN CALCIUM 80 MILLIGRAM(S): 80 TABLET, FILM COATED ORAL at 00:09

## 2020-11-23 RX ADMIN — HYDROMORPHONE HYDROCHLORIDE 0.5 MILLIGRAM(S): 2 INJECTION INTRAMUSCULAR; INTRAVENOUS; SUBCUTANEOUS at 17:25

## 2020-11-23 RX ADMIN — Medication 3 MILLIGRAM(S): at 02:06

## 2020-11-23 RX ADMIN — Medication 0.5 MILLIGRAM(S): at 06:53

## 2020-11-23 RX ADMIN — HYDROMORPHONE HYDROCHLORIDE 1 MILLIGRAM(S): 2 INJECTION INTRAMUSCULAR; INTRAVENOUS; SUBCUTANEOUS at 16:50

## 2020-11-23 RX ADMIN — NEOMYCIN SULFATE 500 MILLIGRAM(S): 500 TABLET ORAL at 00:08

## 2020-11-23 RX ADMIN — Medication 60 MILLIGRAM(S): at 02:06

## 2020-11-23 RX ADMIN — SODIUM CHLORIDE 84 MILLILITER(S): 9 INJECTION, SOLUTION INTRAVENOUS at 15:40

## 2020-11-23 RX ADMIN — Medication 400 MILLIGRAM(S): at 18:34

## 2020-11-23 RX ADMIN — HYDROMORPHONE HYDROCHLORIDE 1 MILLIGRAM(S): 2 INJECTION INTRAMUSCULAR; INTRAVENOUS; SUBCUTANEOUS at 16:04

## 2020-11-23 RX ADMIN — OXYCODONE HYDROCHLORIDE 5 MILLIGRAM(S): 5 TABLET ORAL at 20:07

## 2020-11-23 RX ADMIN — HYDROMORPHONE HYDROCHLORIDE 1 MILLIGRAM(S): 2 INJECTION INTRAMUSCULAR; INTRAVENOUS; SUBCUTANEOUS at 15:50

## 2020-11-23 RX ADMIN — Medication 650 MILLIGRAM(S): at 06:32

## 2020-11-23 RX ADMIN — Medication 3 MILLIGRAM(S): at 00:08

## 2020-11-23 RX ADMIN — Medication 60 MILLIGRAM(S): at 22:20

## 2020-11-23 NOTE — PROVIDER CONTACT NOTE (OTHER) - SITUATION
Patient was given stat dose of 30 mg of dilitazem at 2131. Patient scheduled to receive 90mg Cardizem now

## 2020-11-23 NOTE — PROGRESS NOTE ADULT - SUBJECTIVE AND OBJECTIVE BOX
SURGICAL ONCOLOGY  Patient seen and examined.     MEDICATIONS  (STANDING):  atorvastatin 80 milliGRAM(s) Oral at bedtime  chlorthalidone 25 milliGRAM(s) Oral daily  dextrose 5% + sodium chloride 0.45% with potassium chloride 20 mEq/L 1000 milliLiter(s) (100 mL/Hr) IV Continuous <Continuous>  diltiazem    Tablet 60 milliGRAM(s) Oral every 6 hours  enoxaparin Injectable 40 milliGRAM(s) SubCutaneous daily  losartan 50 milliGRAM(s) Oral daily  magnesium sulfate  IVPB 2 Gram(s) IV Intermittent once  melatonin 3 milliGRAM(s) Oral at bedtime  metroNIDAZOLE    Tablet 250 milliGRAM(s) Oral <User Schedule>  neomycin 500 milliGRAM(s) Oral <User Schedule>  nicotine -   7 mG/24Hr(s) Patch 1 patch Transdermal daily  saline laxative (FLEET) Rectal Enema 2 Enema Rectal <User Schedule>    MEDICATIONS  (PRN):  acetaminophen   Tablet .. 650 milliGRAM(s) Oral every 6 hours PRN Mild Pain (1 - 3)      Vital Signs Last 24 Hrs  T(C): 36.7 (23 Nov 2020 07:21), Max: 37.2 (22 Nov 2020 15:56)  T(F): 98.1 (23 Nov 2020 07:21), Max: 99 (22 Nov 2020 15:56)  HR: 69 (23 Nov 2020 07:21) (69 - 96)  BP: 150/98 (23 Nov 2020 07:21) (129/89 - 150/98)  BP(mean): --  RR: 16 (23 Nov 2020 07:21) (16 - 18)  SpO2: 100% (23 Nov 2020 07:21) (100% - 100%)    I&O's Detail    22 Nov 2020 07:01  -  23 Nov 2020 07:00  --------------------------------------------------------  IN:    Oral Fluid: 2480 mL  Total IN: 2480 mL    OUT:    Voided (mL): 675 mL  Total OUT: 675 mL    Total NET: 1805 mL          Daily Height in cm: 170.18 (23 Nov 2020 06:05)    Daily     LABS:                        12.8   8.07  )-----------( 237      ( 23 Nov 2020 06:13 )             38.8     11-23    132<L>  |  101  |  17  ----------------------------<  284<H>  4.9   |  22  |  1.38<H>    Ca    8.9      23 Nov 2020 06:13  Phos  3.0     11-23  Mg     1.8     11-23      PT/INR - ( 23 Nov 2020 06:13 )   PT: 12.6 SEC;   INR: 1.11          PTT - ( 23 Nov 2020 06:13 )  PTT:49.7 SEC      Chief complaint: colon mass  PHYSICAL EXAM:  Gen:   Abd: soft NTND    55yMale with a dysplastic polyp in sigmoid- not amenable for colonoscopic resection  Plan:   - Admitted for BP control  Cards optimized  Proceed to OR  - I have discussed the diagnosis and therapeutic plan with the patient in detail. Patient expressed verbal understanding and willingness to proceed with proposed plan. All questions answered  -Risks, benefits, alternatives, complications including but not limited to bleeding, infection, injury to adjacent structures, sepsis, anastomotic leak, need for stoma, need for further procedures explained to patient in detail. Patient expressed verbal understanding and willingness to proceed with proposed plan.

## 2020-11-23 NOTE — PROGRESS NOTE ADULT - SUBJECTIVE AND OBJECTIVE BOX
CBC (11-23 @ 06:13)                              12.8<L>                         8.07    )----------------(  237        --    % Neutrophils, --    % Lymphocytes, ANC: --                                  38.8<L>              CBC (11-22 @ 06:46)                              14.1                           9.58    )----------------(  278        --    % Neutrophils, --    % Lymphocytes, ANC: --                                  41.8                  BMP (11-23 @ 06:13)             132<L>  |  101     |  17    		Ca++ --      Ca 8.9                ---------------------------------( 284<H>		Mg 1.8                4.9     |  22      |  1.38<H>			Ph 3.0     BMP (11-22 @ 06:46)             136     |  101     |  19    		Ca++ --      Ca 9.7                ---------------------------------( 102<H>		Mg 1.9                4.0     |  23      |  1.35<H>			Ph 3.2         Coags (11-23 @ 06:13)  aPTT 49.7<H> / INR 1.11 / PT 12.6    ABG (11-23 @ 09:12)     7.38 / 33<L> / 412<H> / 21<L> / -4.9 / 99.7<H>%     Lactate:         D TEAM SURGERY PROGRESS NOTE    Overnight events:   - No acute events    SUBJECTIVE:  Patient frustrated that he continues to wait for surgery. Denies abdominal pain, HA, dizziness or palpitations.    OBJECTIVE:  Vital Signs Last 24 Hrs  ICU Vital Signs Last 24 Hrs  T(C): 36.7 (23 Nov 2020 07:21), Max: 37.2 (22 Nov 2020 15:56)  T(F): 98.1 (23 Nov 2020 07:21), Max: 99 (22 Nov 2020 15:56)  HR: 69 (23 Nov 2020 07:21) (69 - 96)  BP: 150/98 (23 Nov 2020 07:21) (129/89 - 150/98)  RR: 16 (23 Nov 2020 07:21) (16 - 18)  SpO2: 100% (23 Nov 2020 07:21) (100% - 100%)      Physical Examination:  GEN: NAD, resting quietly  PULM: symmetric chest rise bilaterally, no increased WOB  ABD: soft, nontender, nondistended, no rebound or guarding  EXTR: no LE erythema, moving all extremities      LABS:             CBC (11-23 @ 06:13)                              12.8<L>                         8.07    )----------------(  237        --    % Neutrophils, --    % Lymphocytes, ANC: --                                  38.8<L>              CBC (11-22 @ 06:46)                              14.1                           9.58    )----------------(  278        --    % Neutrophils, --    % Lymphocytes, ANC: --                                  41.8                  BMP (11-23 @ 06:13)             132<L>  |  101     |  17    		Ca++ --      Ca 8.9                ---------------------------------( 284<H>		Mg 1.8                4.9     |  22      |  1.38<H>			Ph 3.0     BMP (11-22 @ 06:46)             136     |  101     |  19    		Ca++ --      Ca 9.7                ---------------------------------( 102<H>		Mg 1.9                4.0     |  23      |  1.35<H>			Ph 3.2         Coags (11-23 @ 06:13)  aPTT 49.7<H> / INR 1.11 / PT 12.6    ABG (11-23 @ 09:12)     7.38 / 33<L> / 412<H> / 21<L> / -4.9 / 99.7<H>%     Lactate:

## 2020-11-23 NOTE — BRIEF OPERATIVE NOTE - NSICDXBRIEFPROCEDURE_GEN_ALL_CORE_FT
PROCEDURES:  Robot-assisted surgical removal of sigmoid colon using da Rosa Xi 23-Nov-2020 15:29:56  Jose Whitlock  Colonoscopy 23-Nov-2020 15:27:41  Jose Whitlock

## 2020-11-23 NOTE — PROGRESS NOTE ADULT - ASSESSMENT
55M hx of HTN, CAD presented for elective robotic assisted LAR with flex sigmoidoscopy on 11/19 for colonic mass (adenomatous/dysplastic lesion seen on colonoscopy 10/12), however case cancelled given hypertensive urgency with diastolic BP in 120s. Patient admitted for BP control with plan for OR Monday. BP control improving.    Plan:  - OR today for robotic LAR and flex sig  - c/w diltiazem to 90mg q6h  - Atorvastatin to 80mg daily  - Cardiology following for BP control--> follow-up recs  - LVX for VTE ppx  - F/u AM labs  - OOB/ambulation    D Team Surgery, [id number]

## 2020-11-23 NOTE — CHART NOTE - NSCHARTNOTEFT_GEN_A_CORE
Subjective: Patient reports abdominal discomfort currently. No CP/SOB. No RBF yet.     Objective:   Physical Exam:  General: NAD, sitting upright in bed, anxious but nontoxic  Chest: rrr; nonlabored, shallow respirations  Abdomen: soft, moderately ttp with subQ emphysema, nondistended; port incisions CDI    Vital Signs Last 24 Hrs  T(C): 36.6 (23 Nov 2020 19:51), Max: 36.9 (23 Nov 2020 15:33)  T(F): 97.9 (23 Nov 2020 19:51), Max: 98.4 (23 Nov 2020 15:33)  HR: 73 (23 Nov 2020 19:51) (69 - 89)  BP: 159/97 (23 Nov 2020 19:51) (129/89 - 167/101)  BP(mean): 120 (23 Nov 2020 18:00) (102 - 129)  RR: 18 (23 Nov 2020 19:51) (12 - 26)  SpO2: 100% (23 Nov 2020 19:51) (95% - 100%)    I&O's Detail    22 Nov 2020 07:01  -  23 Nov 2020 07:00  --------------------------------------------------------  IN:    Oral Fluid: 2480 mL  Total IN: 2480 mL    OUT:    Voided (mL): 675 mL  Total OUT: 675 mL    Total NET: 1805 mL      23 Nov 2020 07:01  -  23 Nov 2020 22:45  --------------------------------------------------------  IN:    Lactated Ringers: 84 mL    Oral Fluid: 240 mL  Total IN: 324 mL    OUT:    Indwelling Catheter - Urethral (mL): 350 mL  Total OUT: 350 mL    Total NET: -26 mL      MEDICATIONS  (STANDING):  acetaminophen  IVPB .. 1000 milliGRAM(s) IV Intermittent every 6 hours  atorvastatin 80 milliGRAM(s) Oral at bedtime  chlorthalidone 25 milliGRAM(s) Oral daily  diltiazem    Tablet 60 milliGRAM(s) Oral every 6 hours  enoxaparin Injectable 40 milliGRAM(s) SubCutaneous daily  ketorolac   Injectable 15 milliGRAM(s) IV Push once  lactated ringers. 1000 milliLiter(s) (84 mL/Hr) IV Continuous <Continuous>  losartan 50 milliGRAM(s) Oral daily  magnesium sulfate  IVPB 2 Gram(s) IV Intermittent once  melatonin 3 milliGRAM(s) Oral at bedtime  nicotine -   7 mG/24Hr(s) Patch 1 patch Transdermal daily    MEDICATIONS  (PRN):  acetaminophen   Tablet .. 650 milliGRAM(s) Oral every 6 hours PRN Mild Pain (1 - 3)  HYDROmorphone  Injectable 0.5 milliGRAM(s) IV Push every 6 hours PRN Severe Pain (7 - 10)  oxyCODONE    IR 10 milliGRAM(s) Oral every 4 hours PRN Severe Pain (7 - 10)  oxyCODONE    IR 5 milliGRAM(s) Oral every 4 hours PRN Moderate Pain (4 - 6)      LABS:                        11.5   12.99 )-----------( 213      ( 23 Nov 2020 17:52 )             33.3     11-23    136  |  103  |  15  ----------------------------<  147<H>  4.3   |  22  |  1.46<H>    Ca    8.9      23 Nov 2020 17:52  Phos  3.3     11-23  Mg     1.5     11-23      PT/INR - ( 23 Nov 2020 06:13 )   PT: 12.6 SEC;   INR: 1.11     PTT - ( 23 Nov 2020 06:13 )  PTT:49.7 SEC  ABO Interpretation: O (11-23-20 @ 02:13)      A/P: 55 M s/p robotic sigmoidectomy for colon CA. Stable.  - inadequate pain control: Ofirmev standing; Oxy 2.5/5 increased to 5/10; Toradol 15 x1 STAT  - CLD  - BP control: if Diastolic BP>100, will increase cardizem to 90  - replete Mg  - DVT ppx: Lovenox 40  - monitor vitals    Team D Surg Onc [id number]

## 2020-11-24 LAB
ANION GAP SERPL CALC-SCNC: 10 MMO/L — SIGNIFICANT CHANGE UP (ref 7–14)
BUN SERPL-MCNC: 13 MG/DL — SIGNIFICANT CHANGE UP (ref 7–23)
CALCIUM SERPL-MCNC: 8.8 MG/DL — SIGNIFICANT CHANGE UP (ref 8.4–10.5)
CHLORIDE SERPL-SCNC: 100 MMOL/L — SIGNIFICANT CHANGE UP (ref 98–107)
CO2 SERPL-SCNC: 25 MMOL/L — SIGNIFICANT CHANGE UP (ref 22–31)
CREAT SERPL-MCNC: 1.27 MG/DL — SIGNIFICANT CHANGE UP (ref 0.5–1.3)
GLUCOSE SERPL-MCNC: 93 MG/DL — SIGNIFICANT CHANGE UP (ref 70–99)
HCT VFR BLD CALC: 32 % — LOW (ref 39–50)
HGB BLD-MCNC: 10.6 G/DL — LOW (ref 13–17)
MAGNESIUM SERPL-MCNC: 2.1 MG/DL — SIGNIFICANT CHANGE UP (ref 1.6–2.6)
MCHC RBC-ENTMCNC: 29 PG — SIGNIFICANT CHANGE UP (ref 27–34)
MCHC RBC-ENTMCNC: 33.1 % — SIGNIFICANT CHANGE UP (ref 32–36)
MCV RBC AUTO: 87.4 FL — SIGNIFICANT CHANGE UP (ref 80–100)
NRBC # FLD: 0 K/UL — SIGNIFICANT CHANGE UP (ref 0–0)
PHOSPHATE SERPL-MCNC: 2.9 MG/DL — SIGNIFICANT CHANGE UP (ref 2.5–4.5)
PLATELET # BLD AUTO: 212 K/UL — SIGNIFICANT CHANGE UP (ref 150–400)
PMV BLD: 11.1 FL — SIGNIFICANT CHANGE UP (ref 7–13)
POTASSIUM SERPL-MCNC: 4.1 MMOL/L — SIGNIFICANT CHANGE UP (ref 3.5–5.3)
POTASSIUM SERPL-SCNC: 4.1 MMOL/L — SIGNIFICANT CHANGE UP (ref 3.5–5.3)
RBC # BLD: 3.66 M/UL — LOW (ref 4.2–5.8)
RBC # FLD: 13.3 % — SIGNIFICANT CHANGE UP (ref 10.3–14.5)
SODIUM SERPL-SCNC: 135 MMOL/L — SIGNIFICANT CHANGE UP (ref 135–145)
WBC # BLD: 13.76 K/UL — HIGH (ref 3.8–10.5)
WBC # FLD AUTO: 13.76 K/UL — HIGH (ref 3.8–10.5)

## 2020-11-24 RX ORDER — HYDROMORPHONE HYDROCHLORIDE 2 MG/ML
0.5 INJECTION INTRAMUSCULAR; INTRAVENOUS; SUBCUTANEOUS ONCE
Refills: 0 | Status: COMPLETED | OUTPATIENT
Start: 2020-11-24 | End: 2020-11-24

## 2020-11-24 RX ORDER — HYDROMORPHONE HYDROCHLORIDE 2 MG/ML
0.5 INJECTION INTRAMUSCULAR; INTRAVENOUS; SUBCUTANEOUS EVERY 4 HOURS
Refills: 0 | Status: DISCONTINUED | OUTPATIENT
Start: 2020-11-24 | End: 2020-11-27

## 2020-11-24 RX ORDER — CEFOTETAN DISODIUM 1 G
2 VIAL (EA) INJECTION EVERY 12 HOURS
Refills: 0 | Status: DISCONTINUED | OUTPATIENT
Start: 2020-11-24 | End: 2020-11-25

## 2020-11-24 RX ORDER — ENOXAPARIN SODIUM 100 MG/ML
40 INJECTION SUBCUTANEOUS
Qty: 1120 | Refills: 0
Start: 2020-11-24 | End: 2020-12-21

## 2020-11-24 RX ORDER — DILTIAZEM HCL 120 MG
90 CAPSULE, EXT RELEASE 24 HR ORAL EVERY 6 HOURS
Refills: 0 | Status: DISCONTINUED | OUTPATIENT
Start: 2020-11-24 | End: 2020-11-25

## 2020-11-24 RX ORDER — DEXTROSE MONOHYDRATE, SODIUM CHLORIDE, AND POTASSIUM CHLORIDE 50; .745; 4.5 G/1000ML; G/1000ML; G/1000ML
1000 INJECTION, SOLUTION INTRAVENOUS
Refills: 0 | Status: DISCONTINUED | OUTPATIENT
Start: 2020-11-24 | End: 2020-11-26

## 2020-11-24 RX ORDER — HYDROMORPHONE HYDROCHLORIDE 2 MG/ML
0.5 INJECTION INTRAMUSCULAR; INTRAVENOUS; SUBCUTANEOUS
Refills: 0 | Status: DISCONTINUED | OUTPATIENT
Start: 2020-11-24 | End: 2020-11-24

## 2020-11-24 RX ADMIN — HYDROMORPHONE HYDROCHLORIDE 0.5 MILLIGRAM(S): 2 INJECTION INTRAMUSCULAR; INTRAVENOUS; SUBCUTANEOUS at 15:23

## 2020-11-24 RX ADMIN — Medication 15 MILLIGRAM(S): at 00:09

## 2020-11-24 RX ADMIN — Medication 100 GRAM(S): at 17:42

## 2020-11-24 RX ADMIN — LOSARTAN POTASSIUM 50 MILLIGRAM(S): 100 TABLET, FILM COATED ORAL at 05:13

## 2020-11-24 RX ADMIN — HYDROMORPHONE HYDROCHLORIDE 0.5 MILLIGRAM(S): 2 INJECTION INTRAMUSCULAR; INTRAVENOUS; SUBCUTANEOUS at 08:51

## 2020-11-24 RX ADMIN — OXYCODONE HYDROCHLORIDE 5 MILLIGRAM(S): 5 TABLET ORAL at 23:33

## 2020-11-24 RX ADMIN — Medication 1000 MILLIGRAM(S): at 13:00

## 2020-11-24 RX ADMIN — Medication 400 MILLIGRAM(S): at 12:00

## 2020-11-24 RX ADMIN — ENOXAPARIN SODIUM 40 MILLIGRAM(S): 100 INJECTION SUBCUTANEOUS at 12:00

## 2020-11-24 RX ADMIN — Medication 90 MILLIGRAM(S): at 17:43

## 2020-11-24 RX ADMIN — Medication 90 MILLIGRAM(S): at 23:28

## 2020-11-24 RX ADMIN — HYDROMORPHONE HYDROCHLORIDE 0.5 MILLIGRAM(S): 2 INJECTION INTRAMUSCULAR; INTRAVENOUS; SUBCUTANEOUS at 22:17

## 2020-11-24 RX ADMIN — Medication 60 MILLIGRAM(S): at 05:13

## 2020-11-24 RX ADMIN — HYDROMORPHONE HYDROCHLORIDE 0.5 MILLIGRAM(S): 2 INJECTION INTRAMUSCULAR; INTRAVENOUS; SUBCUTANEOUS at 18:17

## 2020-11-24 RX ADMIN — HYDROMORPHONE HYDROCHLORIDE 0.5 MILLIGRAM(S): 2 INJECTION INTRAMUSCULAR; INTRAVENOUS; SUBCUTANEOUS at 22:47

## 2020-11-24 RX ADMIN — Medication 1 PATCH: at 20:06

## 2020-11-24 RX ADMIN — OXYCODONE HYDROCHLORIDE 10 MILLIGRAM(S): 5 TABLET ORAL at 14:00

## 2020-11-24 RX ADMIN — Medication 3 MILLIGRAM(S): at 23:29

## 2020-11-24 RX ADMIN — Medication 90 MILLIGRAM(S): at 12:00

## 2020-11-24 RX ADMIN — Medication 1000 MILLIGRAM(S): at 00:09

## 2020-11-24 RX ADMIN — Medication 50 GRAM(S): at 08:51

## 2020-11-24 RX ADMIN — OXYCODONE HYDROCHLORIDE 10 MILLIGRAM(S): 5 TABLET ORAL at 06:25

## 2020-11-24 RX ADMIN — HYDROMORPHONE HYDROCHLORIDE 0.5 MILLIGRAM(S): 2 INJECTION INTRAMUSCULAR; INTRAVENOUS; SUBCUTANEOUS at 09:20

## 2020-11-24 RX ADMIN — Medication 25 MILLIGRAM(S): at 05:13

## 2020-11-24 RX ADMIN — Medication 1 PATCH: at 12:48

## 2020-11-24 RX ADMIN — Medication 400 MILLIGRAM(S): at 05:14

## 2020-11-24 RX ADMIN — OXYCODONE HYDROCHLORIDE 10 MILLIGRAM(S): 5 TABLET ORAL at 13:20

## 2020-11-24 RX ADMIN — ATORVASTATIN CALCIUM 80 MILLIGRAM(S): 80 TABLET, FILM COATED ORAL at 23:28

## 2020-11-24 RX ADMIN — OXYCODONE HYDROCHLORIDE 10 MILLIGRAM(S): 5 TABLET ORAL at 05:18

## 2020-11-24 RX ADMIN — Medication 1000 MILLIGRAM(S): at 06:24

## 2020-11-24 RX ADMIN — HYDROMORPHONE HYDROCHLORIDE 0.5 MILLIGRAM(S): 2 INJECTION INTRAMUSCULAR; INTRAVENOUS; SUBCUTANEOUS at 14:45

## 2020-11-24 RX ADMIN — OXYCODONE HYDROCHLORIDE 2.5 MILLIGRAM(S): 5 TABLET ORAL at 00:10

## 2020-11-24 NOTE — PROGRESS NOTE ADULT - SUBJECTIVE AND OBJECTIVE BOX
S: no chest pain or sob; complains of post op abdominal pain; ros otherwise negative.     atorvastatin 80 milliGRAM(s) Oral at bedtime  cefoTEtan  IVPB 2 Gram(s) IV Intermittent every 12 hours  chlorthalidone 25 milliGRAM(s) Oral daily  dextrose 5% + sodium chloride 0.45% with potassium chloride 20 mEq/L 1000 milliLiter(s) IV Continuous <Continuous>  diltiazem    Tablet 90 milliGRAM(s) Oral every 6 hours  enoxaparin Injectable 40 milliGRAM(s) SubCutaneous daily  HYDROmorphone  Injectable 0.5 milliGRAM(s) IV Push every 4 hours PRN  losartan 50 milliGRAM(s) Oral daily  melatonin 3 milliGRAM(s) Oral at bedtime  nicotine -   7 mG/24Hr(s) Patch 1 patch Transdermal daily  oxyCODONE    IR 10 milliGRAM(s) Oral every 4 hours PRN  oxyCODONE    IR 5 milliGRAM(s) Oral every 4 hours PRN                            10.6   13.76 )-----------( 212      ( 24 Nov 2020 05:45 )             32.0       11-24    135  |  100  |  13  ----------------------------<  93  4.1   |  25  |  1.27    Ca    8.8      24 Nov 2020 05:45  Phos  2.9     11-24  Mg     2.1     11-24              T(C): 36.1 (11-24-20 @ 12:10), Max: 36.9 (11-23-20 @ 15:33)  HR: 55 (11-24-20 @ 12:10) (55 - 89)  BP: 150/87 (11-24-20 @ 12:10) (146/95 - 173/101)  RR: 16 (11-24-20 @ 12:10) (12 - 26)  SpO2: 100% (11-24-20 @ 12:10) (95% - 100%)  Wt(kg): --    I&O's Summary    23 Nov 2020 07:01  -  24 Nov 2020 07:00  --------------------------------------------------------  IN: 324 mL / OUT: 1750 mL / NET: -1426 mL    24 Nov 2020 07:01  -  24 Nov 2020 14:24  --------------------------------------------------------  IN: 0 mL / OUT: 200 mL / NET: -200 mL          General: Well nourished in no acute distress. Alert and Oriented * 3.   Head: Normocephalic and atraumatic.   Neck: No JVD. No bruits. Supple. Does not appear to be enlarged.   Cardiovascular: + S1,S2 ; RRR Soft systolic murmur at the left lower sternal border. No rubs noted.    Lungs: CTA b/l. No rhonchi, rales or wheezes.   Abdomen: + BS, soft. Non tender. Non distended. No rebound. No guarding.   Extremities: No clubbing/cyanosis/edema.   Neurologic: Moves all four extremities. Full range of motion.   Skin: Warm and moist. The patient's skin has normal elasticity and good skin turgor.   Psychiatric: Appropriate mood and affect.  Musculoskeletal: Normal range of motion, normal strength    TELE: not on tele    A/P: 55M hx of HTN, tobacco abuse, CAD, s/p recent [facility] 10/2020 at [facility] with 70% mRCA, medically managed for now, staged PCI planned post op, presented for elective robotic assisted LAR with flex sigmoidoscopy today for colonic mass (adenomatous/dysplastic lesion seen on colonoscopy 10/12).    -continue with medical management of known CAD  -pt. with no chest pain or anginal symptoms  -tolerated procedure well in terms of cv perspective  -continue with oral anti-hypertensive medications - uptitrate as tolerated / needed  -no further cardiac workup anticipated at this time    Nga Delvalle MD

## 2020-11-24 NOTE — PROVIDER CONTACT NOTE (OTHER) - ACTION/TREATMENT ORDERED:
give ativan before patient goes to OR
At bedside stat 30mg cardizem ordered
Hydralazine
No new orders
Recheck vital in an hour and will change dose to 60mg
no further interventions noted at this time

## 2020-11-24 NOTE — PROGRESS NOTE ADULT - SUBJECTIVE AND OBJECTIVE BOX
D TEAM SURGERY PROGRESS NOTE    Overnight events:   - No acute events    SUBJECTIVE:  Patient seen and examined at bedside. Doing well postop. Patient uncomfortable with saez catheter but understands the necessity. Denies abdominal pain, HA, dizziness or palpitations.    OBJECTIVE:  Vital Signs Last 24 Hrs  T(C): 36.9 (11-24-20 @ 05:09), Max: 36.9 (11-23-20 @ 15:33)  HR: 64 (11-24-20 @ 05:09) (64 - 89)  BP: 161/102 (11-24-20 @ 05:09) (146/95 - 167/101)  ABP: 150/91 (11-23-20 @ 18:00) (147/86 - 170/100)  ABP(mean): 113 (11-23-20 @ 18:00) (106 - 125)  RR: 16 (11-24-20 @ 05:09) (12 - 26)  SpO2: 100% (11-24-20 @ 05:09) (95% - 100%)      11-23 @ 07:01  -  11-24 @ 07:00  --------------------------------------------------------  IN:    Lactated Ringers: 84 mL    Oral Fluid: 240 mL  Total IN: 324 mL    OUT:    Indwelling Catheter - Urethral (mL): 1750 mL  Total OUT: 1750 mL    Total NET: -1426 mL        Physical Examination:  GEN: NAD, resting quietly  PULM: symmetric chest rise bilaterally, no increased WOB  ABD: soft, nontender, nondistended, no rebound or guarding  EXTR: no LE erythema, moving all extremities      LABS:  CBC (11-24 @ 05:45)                              10.6<L>                         13.76<H>  )----------------(  212        --    % Neutrophils, --    % Lymphocytes, ANC: --                                  32.0<L>              CBC (11-23 @ 17:52)                              11.5<L>                         12.99<H>  )----------------(  213        --    % Neutrophils, --    % Lymphocytes, ANC: --                                  33.3<L>                BMP (11-24 @ 05:45)             135     |  100     |  13    		Ca++ --      Ca 8.8                ---------------------------------( 93    		Mg 2.1                4.1     |  25      |  1.27  			Ph 2.9     BMP (11-23 @ 17:52)             136     |  103     |  15    		Ca++ --      Ca 8.9                ---------------------------------( 147<H>		Mg 1.5<L>             4.3     |  22      |  1.46<H>			Ph 3.3         Coags (11-23 @ 06:13)  aPTT 49.7<H> / INR 1.11 / PT 12.6    ABG (11-23 @ 09:12)     7.38 / 33<L> / 412<H> / 21<L> / -4.9 / 99.7<H>%     Lactate:

## 2020-11-24 NOTE — PHYSICAL THERAPY INITIAL EVALUATION ADULT - PERTINENT HX OF CURRENT PROBLEM, REHAB EVAL
54 y/o Male hx of HTN, CAD s/p elective robotic assisted LAR with flex sigmoidoscopy for colonic mass; case was originally scheduled for 11/19 but cancelled given hypertensive urgency with diastolic BP in 120s.

## 2020-11-24 NOTE — PROGRESS NOTE ADULT - ASSESSMENT
55M hx of HTN, CAD presented for elective robotic assisted LAR with flex sigmoidoscopy on 11/19 for colonic mass (adenomatous/dysplastic lesion seen on colonoscopy 10/12), however case cancelled given hypertensive urgency with diastolic BP in 120s. BP control improving. Now s/p robotic sigmoidectomy for colon CA. Stable.    Plan:  - Pain control as needed  - Increase diltiazem to 90mg q6h  - Atorvastatin to 80mg daily  - Cardiology following for BP control--> follow-up recs  - c/w CLD; monitor GI function  - Saldana catheter in place; monitor I/Os  - LVX for VTE ppx; will be discharged home with Lovenox  - Lovenox teaching   - OOB/ambulation    D Team Surgery, [id number]

## 2020-11-25 LAB
ANION GAP SERPL CALC-SCNC: 12 MMO/L — SIGNIFICANT CHANGE UP (ref 7–14)
BUN SERPL-MCNC: 9 MG/DL — SIGNIFICANT CHANGE UP (ref 7–23)
CALCIUM SERPL-MCNC: 9.6 MG/DL — SIGNIFICANT CHANGE UP (ref 8.4–10.5)
CHLORIDE SERPL-SCNC: 98 MMOL/L — SIGNIFICANT CHANGE UP (ref 98–107)
CO2 SERPL-SCNC: 25 MMOL/L — SIGNIFICANT CHANGE UP (ref 22–31)
CREAT SERPL-MCNC: 1.05 MG/DL — SIGNIFICANT CHANGE UP (ref 0.5–1.3)
GLUCOSE SERPL-MCNC: 119 MG/DL — HIGH (ref 70–99)
HCT VFR BLD CALC: 37.8 % — LOW (ref 39–50)
HGB BLD-MCNC: 12.4 G/DL — LOW (ref 13–17)
MAGNESIUM SERPL-MCNC: 1.8 MG/DL — SIGNIFICANT CHANGE UP (ref 1.6–2.6)
MCHC RBC-ENTMCNC: 28.9 PG — SIGNIFICANT CHANGE UP (ref 27–34)
MCHC RBC-ENTMCNC: 32.8 % — SIGNIFICANT CHANGE UP (ref 32–36)
MCV RBC AUTO: 88.1 FL — SIGNIFICANT CHANGE UP (ref 80–100)
NRBC # FLD: 0 K/UL — SIGNIFICANT CHANGE UP (ref 0–0)
PHOSPHATE SERPL-MCNC: 2 MG/DL — LOW (ref 2.5–4.5)
PLATELET # BLD AUTO: 231 K/UL — SIGNIFICANT CHANGE UP (ref 150–400)
PMV BLD: 11.6 FL — SIGNIFICANT CHANGE UP (ref 7–13)
POTASSIUM SERPL-MCNC: 3.8 MMOL/L — SIGNIFICANT CHANGE UP (ref 3.5–5.3)
POTASSIUM SERPL-SCNC: 3.8 MMOL/L — SIGNIFICANT CHANGE UP (ref 3.5–5.3)
RBC # BLD: 4.29 M/UL — SIGNIFICANT CHANGE UP (ref 4.2–5.8)
RBC # FLD: 13.3 % — SIGNIFICANT CHANGE UP (ref 10.3–14.5)
SODIUM SERPL-SCNC: 135 MMOL/L — SIGNIFICANT CHANGE UP (ref 135–145)
WBC # BLD: 11.35 K/UL — HIGH (ref 3.8–10.5)
WBC # FLD AUTO: 11.35 K/UL — HIGH (ref 3.8–10.5)

## 2020-11-25 RX ORDER — KETOROLAC TROMETHAMINE 30 MG/ML
15 SYRINGE (ML) INJECTION EVERY 6 HOURS
Refills: 0 | Status: DISCONTINUED | OUTPATIENT
Start: 2020-11-25 | End: 2020-11-26

## 2020-11-25 RX ORDER — CHLORTHALIDONE 50 MG
50 TABLET ORAL DAILY
Refills: 0 | Status: DISCONTINUED | OUTPATIENT
Start: 2020-11-26 | End: 2020-11-27

## 2020-11-25 RX ORDER — LOSARTAN POTASSIUM 100 MG/1
50 TABLET, FILM COATED ORAL ONCE
Refills: 0 | Status: COMPLETED | OUTPATIENT
Start: 2020-11-25 | End: 2020-11-25

## 2020-11-25 RX ORDER — DILTIAZEM HCL 120 MG
90 CAPSULE, EXT RELEASE 24 HR ORAL EVERY 6 HOURS
Refills: 0 | Status: DISCONTINUED | OUTPATIENT
Start: 2020-11-25 | End: 2020-11-27

## 2020-11-25 RX ORDER — LOSARTAN POTASSIUM 100 MG/1
100 TABLET, FILM COATED ORAL DAILY
Refills: 0 | Status: DISCONTINUED | OUTPATIENT
Start: 2020-11-26 | End: 2020-11-27

## 2020-11-25 RX ORDER — MAGNESIUM SULFATE 500 MG/ML
2 VIAL (ML) INJECTION ONCE
Refills: 0 | Status: COMPLETED | OUTPATIENT
Start: 2020-11-25 | End: 2020-11-25

## 2020-11-25 RX ORDER — CHLORTHALIDONE 50 MG
25 TABLET ORAL ONCE
Refills: 0 | Status: COMPLETED | OUTPATIENT
Start: 2020-11-25 | End: 2020-11-25

## 2020-11-25 RX ORDER — ACETAMINOPHEN 500 MG
975 TABLET ORAL EVERY 6 HOURS
Refills: 0 | Status: DISCONTINUED | OUTPATIENT
Start: 2020-11-25 | End: 2020-11-27

## 2020-11-25 RX ORDER — POTASSIUM PHOSPHATE, MONOBASIC POTASSIUM PHOSPHATE, DIBASIC 236; 224 MG/ML; MG/ML
30 INJECTION, SOLUTION INTRAVENOUS ONCE
Refills: 0 | Status: COMPLETED | OUTPATIENT
Start: 2020-11-25 | End: 2020-11-25

## 2020-11-25 RX ADMIN — Medication 1 PATCH: at 06:30

## 2020-11-25 RX ADMIN — Medication 975 MILLIGRAM(S): at 08:28

## 2020-11-25 RX ADMIN — Medication 15 MILLIGRAM(S): at 11:02

## 2020-11-25 RX ADMIN — Medication 15 MILLIGRAM(S): at 11:15

## 2020-11-25 RX ADMIN — DEXTROSE MONOHYDRATE, SODIUM CHLORIDE, AND POTASSIUM CHLORIDE 42 MILLILITER(S): 50; .745; 4.5 INJECTION, SOLUTION INTRAVENOUS at 20:20

## 2020-11-25 RX ADMIN — Medication 90 MILLIGRAM(S): at 17:32

## 2020-11-25 RX ADMIN — OXYCODONE HYDROCHLORIDE 5 MILLIGRAM(S): 5 TABLET ORAL at 00:03

## 2020-11-25 RX ADMIN — Medication 975 MILLIGRAM(S): at 08:25

## 2020-11-25 RX ADMIN — OXYCODONE HYDROCHLORIDE 10 MILLIGRAM(S): 5 TABLET ORAL at 04:50

## 2020-11-25 RX ADMIN — Medication 3 MILLIGRAM(S): at 23:01

## 2020-11-25 RX ADMIN — LOSARTAN POTASSIUM 50 MILLIGRAM(S): 100 TABLET, FILM COATED ORAL at 08:26

## 2020-11-25 RX ADMIN — Medication 1 PATCH: at 11:05

## 2020-11-25 RX ADMIN — Medication 50 GRAM(S): at 11:04

## 2020-11-25 RX ADMIN — Medication 975 MILLIGRAM(S): at 17:32

## 2020-11-25 RX ADMIN — ENOXAPARIN SODIUM 40 MILLIGRAM(S): 100 INJECTION SUBCUTANEOUS at 11:02

## 2020-11-25 RX ADMIN — LOSARTAN POTASSIUM 50 MILLIGRAM(S): 100 TABLET, FILM COATED ORAL at 05:29

## 2020-11-25 RX ADMIN — Medication 90 MILLIGRAM(S): at 11:03

## 2020-11-25 RX ADMIN — Medication 25 MILLIGRAM(S): at 08:25

## 2020-11-25 RX ADMIN — Medication 15 MILLIGRAM(S): at 17:32

## 2020-11-25 RX ADMIN — Medication 975 MILLIGRAM(S): at 23:00

## 2020-11-25 RX ADMIN — ATORVASTATIN CALCIUM 80 MILLIGRAM(S): 80 TABLET, FILM COATED ORAL at 23:01

## 2020-11-25 RX ADMIN — Medication 90 MILLIGRAM(S): at 05:29

## 2020-11-25 RX ADMIN — POTASSIUM PHOSPHATE, MONOBASIC POTASSIUM PHOSPHATE, DIBASIC 83.33 MILLIMOLE(S): 236; 224 INJECTION, SOLUTION INTRAVENOUS at 11:05

## 2020-11-25 RX ADMIN — Medication 1 PATCH: at 11:02

## 2020-11-25 RX ADMIN — OXYCODONE HYDROCHLORIDE 10 MILLIGRAM(S): 5 TABLET ORAL at 04:20

## 2020-11-25 RX ADMIN — Medication 15 MILLIGRAM(S): at 23:01

## 2020-11-25 RX ADMIN — Medication 25 MILLIGRAM(S): at 05:29

## 2020-11-25 RX ADMIN — Medication 1 PATCH: at 20:20

## 2020-11-25 RX ADMIN — DEXTROSE MONOHYDRATE, SODIUM CHLORIDE, AND POTASSIUM CHLORIDE 42 MILLILITER(S): 50; .745; 4.5 INJECTION, SOLUTION INTRAVENOUS at 05:29

## 2020-11-25 RX ADMIN — Medication 90 MILLIGRAM(S): at 23:00

## 2020-11-25 NOTE — PROGRESS NOTE ADULT - SUBJECTIVE AND OBJECTIVE BOX
D TEAM SURGERY PROGRESS NOTE    STATUS POST:  Robotic LAR and flexible sigmoidoscopy for colon cancer    POST OPERATIVE DAY #: 2    SUBJECTIVE:   Patient seen and examined at bedside. He complains of not being able to sleep the past two nights because of pain, which he feels is in all quadrants of his abdomen. He does not feel that the oxycodone is helping. He has passed one watery bowel movement, which was not associated with pain, and "not much" flatus. He is belching frequently. He is able to ambulate without assistance to the bathroom. He denies chest pain or shortness of breath.      OBJECTIVE: T(C): 37.3 (11-25-20 @ 05:24), Max: 37.3 (11-25-20 @ 05:24)  HR: 73 (11-25-20 @ 05:24) (55 - 73)  BP: 187/111 (11-25-20 @ 05:24) (141/83 - 187/111)  RR: 18 (11-25-20 @ 05:24) (16 - 19)  SpO2: 100% (11-25-20 @ 05:24) (98% - 100%)  Wt(kg): --  I&O's Summary    24 Nov 2020 07:01  -  25 Nov 2020 07:00  --------------------------------------------------------  IN: 864 mL / OUT: 1950 mL / NET: -1086 mL      I&O's Detail    24 Nov 2020 07:01  -  25 Nov 2020 07:00  --------------------------------------------------------  IN:    dextrose 5% + sodium chloride 0.45% w/ Additives: 504 mL    Oral Fluid: 360 mL  Total IN: 864 mL    OUT:    Indwelling Catheter - Urethral (mL): 200 mL    Voided (mL): 1750 mL  Total OUT: 1950 mL    Total NET: -1086 mL    General: Uncomfortable-appearing, lying in bed  CV: Well perfused  Lungs: No increased work of breathing  Abdomen: Distended, normal bowel sounds. Did not agree to palpation  Extremities: Spontaneous movement of all 4 extremities    MEDICATIONS  (STANDING):  acetaminophen   Tablet .. 975 milliGRAM(s) Oral every 6 hours  atorvastatin 80 milliGRAM(s) Oral at bedtime  chlorthalidone 25 milliGRAM(s) Oral once  dextrose 5% + sodium chloride 0.45% with potassium chloride 20 mEq/L 1000 milliLiter(s) (42 mL/Hr) IV Continuous <Continuous>  diltiazem    Tablet 90 milliGRAM(s) Oral every 6 hours  enoxaparin Injectable 40 milliGRAM(s) SubCutaneous daily  losartan 50 milliGRAM(s) Oral once  melatonin 3 milliGRAM(s) Oral at bedtime  nicotine -   7 mG/24Hr(s) Patch 1 patch Transdermal daily    MEDICATIONS  (PRN):  HYDROmorphone  Injectable 0.5 milliGRAM(s) IV Push every 4 hours PRN breakthrough pain  oxyCODONE    IR 10 milliGRAM(s) Oral every 4 hours PRN Severe Pain (7 - 10)  oxyCODONE    IR 5 milliGRAM(s) Oral every 4 hours PRN Moderate Pain (4 - 6)      LABS:                        10.6   13.76 )-----------( 212      ( 24 Nov 2020 05:45 )             32.0     11-24    135  |  100  |  13  ----------------------------<  93  4.1   |  25  |  1.27    Ca    8.8      24 Nov 2020 05:45  Phos  2.9     11-24  Mg     2.1     11-24            RADIOLOGY & ADDITIONAL STUDIES:    Assessment/Plan:   55M hx of HTN, CAD presented for elective robotic assisted LAR with flex sigmoidoscopy on 11/19 for colonic mass (adenomatous/dysplastic lesion seen on colonoscopy 10/12), however case cancelled given hypertensive urgency with diastolic BP in 120s. BP control improving. S/p robotic LAR with flexible sigmoidoscopy for colon CA, POD #2. Stable.    Plan:  - Pain control: restarted Tylenol, monitor creatinine  - Diltiazem 90mg q6h  - Atorvastatin 80mg daily  - Cardiology following for BP control  - Can advance to low residue diet if pain controlled, abdomen becomes non-distended and GI function stable  - LVX for VTE ppx; will be discharged home with Lovenox  - Lovenox teaching   - OOB/ambulation    D Team Surgery, [id number]    D TEAM SURGERY PROGRESS NOTE    STATUS POST:  Robotic LAR and flexible sigmoidoscopy for colon cancer    POST OPERATIVE DAY #: 2    SUBJECTIVE:   Patient seen and examined at bedside. He complains of not being able to sleep the past two nights because of pain, which he feels is in all quadrants of his abdomen. He does not feel that the oxycodone is helping. He has passed one watery bowel movement, which was not associated with pain, and "not much" flatus. He is belching frequently. He is able to ambulate without assistance to the bathroom. He denies chest pain or shortness of breath.      OBJECTIVE: T(C): 37.3 (11-25-20 @ 05:24), Max: 37.3 (11-25-20 @ 05:24)  HR: 73 (11-25-20 @ 05:24) (55 - 73)  BP: 187/111 (11-25-20 @ 05:24) (141/83 - 187/111)  RR: 18 (11-25-20 @ 05:24) (16 - 19)  SpO2: 100% (11-25-20 @ 05:24) (98% - 100%)  Wt(kg): --  I&O's Summary    24 Nov 2020 07:01  -  25 Nov 2020 07:00  --------------------------------------------------------  IN: 864 mL / OUT: 1950 mL / NET: -1086 mL      I&O's Detail    24 Nov 2020 07:01  -  25 Nov 2020 07:00  --------------------------------------------------------  IN:    dextrose 5% + sodium chloride 0.45% w/ Additives: 504 mL    Oral Fluid: 360 mL  Total IN: 864 mL    OUT:    Indwelling Catheter - Urethral (mL): 200 mL    Voided (mL): 1750 mL  Total OUT: 1950 mL    Total NET: -1086 mL    General:  lying in bed  CV: Well perfused  Lungs: No increased work of breathing  Abdomen: mildly distended, refused abdominal palpation  Extremities: Spontaneous movement of all 4 extremities        LABS:                        10.6   13.76 )-----------( 212      ( 24 Nov 2020 05:45 )             32.0     11-24    135  |  100  |  13  ----------------------------<  93  4.1   |  25  |  1.27    Ca    8.8      24 Nov 2020 05:45  Phos  2.9     11-24  Mg     2.1     11-24          Assessment/Plan:   55M hx of HTN, CAD presented for elective robotic assisted LAR with flex sigmoidoscopy on 11/19 for colonic mass (adenomatous/dysplastic lesion seen on colonoscopy 10/12), however case cancelled given hypertensive urgency with diastolic BP in 120s now well-controlled and POD2 s/p robotic LAR with flexible sigmoidoscopy for colon CA. Patient stable and recovering appropriately with partial return of bowel function.    Plan:  - Pain control: restarted Tylenol, monitor creatinine  - Diltiazem 90mg q6h  - Atorvastatin 80mg daily  - Cardiology following for BP control  - Can advance to low residue diet if pain controlled later today  - LVX for VTE ppx; will be discharged home with Lovenox  - Lovenox teaching   - OOB/ambulation  - Dispo: home with no skilled needs    D Team Surgery, [id number]

## 2020-11-25 NOTE — PROGRESS NOTE ADULT - SUBJECTIVE AND OBJECTIVE BOX
S: no chest pain or sob; complains of post op abdominal pain, much improved today. ROS otherwise negative.     chief complaint: colon mass    Review of Systems:   Constitutional: [ ] fevers, [ ] chills.   Skin: [ ] dry skin. [ ] rashes.  Psychiatric: [ ] depression, [ ] anxiety.   Gastrointestinal: [ ] BRBPR, [ ] melena.   Neurological: [ ] confusion. [ ] seizures. [ ] shuffling gait.   Ears,Nose,Mouth and Throat: [ ] ear pain [ ] sore throat.   Eyes: [ ] diplopia.   Respiratory: [ ] hemoptysis. [ ] shortness of breath  Cardiovascular: See HPI above  Hematologic/Lymphatic: [ ] anemia. [ ] painful nodes. [ ] prolonged bleeding.   Genitourinary: [ ] hematuria. [ ] flank pain.   Endocrine: [ ] significant change in weight. [ ] intolerance to heat and cold.     Review of systems [x ] otherwise negative, [ ] otherwise unable to obtain    FH: no family history of sudden cardiac death in first degree relatives    SH: [ ] tobacco, [ ] alcohol, [ ] drugs    acetaminophen   Tablet .. 975 milliGRAM(s) Oral every 6 hours  atorvastatin 80 milliGRAM(s) Oral at bedtime  dextrose 5% + sodium chloride 0.45% with potassium chloride 20 mEq/L 1000 milliLiter(s) IV Continuous <Continuous>  diltiazem    Tablet 90 milliGRAM(s) Oral every 6 hours  enoxaparin Injectable 40 milliGRAM(s) SubCutaneous daily  HYDROmorphone  Injectable 0.5 milliGRAM(s) IV Push every 4 hours PRN  ketorolac   Injectable 15 milliGRAM(s) IV Push every 6 hours  melatonin 3 milliGRAM(s) Oral at bedtime  nicotine -   7 mG/24Hr(s) Patch 1 patch Transdermal daily  oxyCODONE    IR 10 milliGRAM(s) Oral every 4 hours PRN  oxyCODONE    IR 5 milliGRAM(s) Oral every 4 hours PRN                            12.4   11.35 )-----------( 231      ( 25 Nov 2020 07:02 )             37.8     135  |  98  |  9   ----------------------------<  119<H>  3.8   |  25  |  1.05    Ca    9.6      25 Nov 2020 07:02  Phos  2.0     11-25  Mg     1.8     11-25    T(C): 36.9 (11-25-20 @ 11:13), Max: 37.3 (11-25-20 @ 05:24)  HR: 68 (11-25-20 @ 11:13) (55 - 73)  BP: 175/107 (11-25-20 @ 11:13) (141/83 - 187/111)  RR: 17 (11-25-20 @ 11:13) (16 - 19)  SpO2: 98% (11-25-20 @ 11:13) (98% - 100%)    General: Well nourished in no acute distress. Alert and Oriented * 3.   Head: Normocephalic and atraumatic.   Neck: No JVD. No bruits. Supple. Does not appear to be enlarged.   Cardiovascular: + S1,S2 ; RRR Soft systolic murmur at the left lower sternal border. No rubs noted.    Lungs: CTA b/l. No rhonchi, rales or wheezes.   Abdomen: + BS, soft. Non tender. Non distended. No rebound. No guarding.   Extremities: No clubbing/cyanosis/edema.   Neurologic: Moves all four extremities. Full range of motion.   Skin: Warm and moist. The patient's skin has normal elasticity and good skin turgor.   Psychiatric: Appropriate mood and affect.  Musculoskeletal: Normal range of motion, normal strength    TELE: not on tele    A/P: 55M hx of HTN, tobacco abuse, CAD, s/p recent [facility] 10/2020 at [facility] with 70% mRCA, medically managed for now, staged PCI planned post op, presented for elective robotic assisted LAR with flex sigmoidoscopy today for colonic mass (adenomatous/dysplastic lesion seen on colonoscopy 10/12).    -continue with medical management of known CAD  -pt. with no chest pain or anginal symptoms  -tolerated procedure well in terms of cv perspective  -meds are being uptitrated, cont Cozaar and CHlorthalidone  -can change to Cardizem  at [place]  -no further cardiac workup anticipated at this time

## 2020-11-25 NOTE — PROGRESS NOTE ADULT - ATTENDING COMMENTS
Patient seen and examined.  Agree with above.   Tolerated procedure well in terms of cv perspective  Bp remains elevated - losartan increased today - monitor respones  Uptitrate antihypertensives as tolerated / needed    Nga Delvalle MD

## 2020-11-26 LAB
ANION GAP SERPL CALC-SCNC: 10 MMO/L — SIGNIFICANT CHANGE UP (ref 7–14)
BUN SERPL-MCNC: 11 MG/DL — SIGNIFICANT CHANGE UP (ref 7–23)
CALCIUM SERPL-MCNC: 9.5 MG/DL — SIGNIFICANT CHANGE UP (ref 8.4–10.5)
CHLORIDE SERPL-SCNC: 98 MMOL/L — SIGNIFICANT CHANGE UP (ref 98–107)
CO2 SERPL-SCNC: 25 MMOL/L — SIGNIFICANT CHANGE UP (ref 22–31)
CREAT SERPL-MCNC: 1.31 MG/DL — HIGH (ref 0.5–1.3)
GLUCOSE SERPL-MCNC: 100 MG/DL — HIGH (ref 70–99)
HCT VFR BLD CALC: 36.1 % — LOW (ref 39–50)
HGB BLD-MCNC: 12.1 G/DL — LOW (ref 13–17)
MAGNESIUM SERPL-MCNC: 2 MG/DL — SIGNIFICANT CHANGE UP (ref 1.6–2.6)
MCHC RBC-ENTMCNC: 29.5 PG — SIGNIFICANT CHANGE UP (ref 27–34)
MCHC RBC-ENTMCNC: 33.5 % — SIGNIFICANT CHANGE UP (ref 32–36)
MCV RBC AUTO: 88 FL — SIGNIFICANT CHANGE UP (ref 80–100)
NRBC # FLD: 0 K/UL — SIGNIFICANT CHANGE UP (ref 0–0)
PHOSPHATE SERPL-MCNC: 3.1 MG/DL — SIGNIFICANT CHANGE UP (ref 2.5–4.5)
PLATELET # BLD AUTO: 224 K/UL — SIGNIFICANT CHANGE UP (ref 150–400)
PMV BLD: 11.3 FL — SIGNIFICANT CHANGE UP (ref 7–13)
POTASSIUM SERPL-MCNC: 4 MMOL/L — SIGNIFICANT CHANGE UP (ref 3.5–5.3)
POTASSIUM SERPL-SCNC: 4 MMOL/L — SIGNIFICANT CHANGE UP (ref 3.5–5.3)
RBC # BLD: 4.1 M/UL — LOW (ref 4.2–5.8)
RBC # FLD: 13.4 % — SIGNIFICANT CHANGE UP (ref 10.3–14.5)
SODIUM SERPL-SCNC: 133 MMOL/L — LOW (ref 135–145)
WBC # BLD: 7.87 K/UL — SIGNIFICANT CHANGE UP (ref 3.8–10.5)
WBC # FLD AUTO: 7.87 K/UL — SIGNIFICANT CHANGE UP (ref 3.8–10.5)

## 2020-11-26 RX ORDER — IBUPROFEN 200 MG
400 TABLET ORAL EVERY 6 HOURS
Refills: 0 | Status: DISCONTINUED | OUTPATIENT
Start: 2020-11-26 | End: 2020-11-26

## 2020-11-26 RX ADMIN — Medication 90 MILLIGRAM(S): at 17:53

## 2020-11-26 RX ADMIN — Medication 1 PATCH: at 07:11

## 2020-11-26 RX ADMIN — Medication 1 PATCH: at 22:06

## 2020-11-26 RX ADMIN — Medication 975 MILLIGRAM(S): at 18:28

## 2020-11-26 RX ADMIN — LOSARTAN POTASSIUM 100 MILLIGRAM(S): 100 TABLET, FILM COATED ORAL at 06:10

## 2020-11-26 RX ADMIN — Medication 975 MILLIGRAM(S): at 23:30

## 2020-11-26 RX ADMIN — Medication 15 MILLIGRAM(S): at 11:12

## 2020-11-26 RX ADMIN — HYDROMORPHONE HYDROCHLORIDE 0.5 MILLIGRAM(S): 2 INJECTION INTRAMUSCULAR; INTRAVENOUS; SUBCUTANEOUS at 23:30

## 2020-11-26 RX ADMIN — OXYCODONE HYDROCHLORIDE 5 MILLIGRAM(S): 5 TABLET ORAL at 17:53

## 2020-11-26 RX ADMIN — Medication 975 MILLIGRAM(S): at 17:53

## 2020-11-26 RX ADMIN — Medication 15 MILLIGRAM(S): at 00:00

## 2020-11-26 RX ADMIN — Medication 975 MILLIGRAM(S): at 11:12

## 2020-11-26 RX ADMIN — ATORVASTATIN CALCIUM 80 MILLIGRAM(S): 80 TABLET, FILM COATED ORAL at 23:00

## 2020-11-26 RX ADMIN — Medication 90 MILLIGRAM(S): at 06:09

## 2020-11-26 RX ADMIN — Medication 90 MILLIGRAM(S): at 23:00

## 2020-11-26 RX ADMIN — Medication 15 MILLIGRAM(S): at 06:10

## 2020-11-26 RX ADMIN — Medication 975 MILLIGRAM(S): at 00:00

## 2020-11-26 RX ADMIN — Medication 1 PATCH: at 11:11

## 2020-11-26 RX ADMIN — Medication 15 MILLIGRAM(S): at 06:25

## 2020-11-26 RX ADMIN — Medication 50 MILLIGRAM(S): at 06:10

## 2020-11-26 RX ADMIN — Medication 3 MILLIGRAM(S): at 23:00

## 2020-11-26 RX ADMIN — Medication 975 MILLIGRAM(S): at 06:09

## 2020-11-26 RX ADMIN — Medication 975 MILLIGRAM(S): at 23:00

## 2020-11-26 RX ADMIN — ENOXAPARIN SODIUM 40 MILLIGRAM(S): 100 INJECTION SUBCUTANEOUS at 11:12

## 2020-11-26 RX ADMIN — OXYCODONE HYDROCHLORIDE 5 MILLIGRAM(S): 5 TABLET ORAL at 18:28

## 2020-11-26 RX ADMIN — Medication 975 MILLIGRAM(S): at 06:53

## 2020-11-26 RX ADMIN — Medication 90 MILLIGRAM(S): at 11:12

## 2020-11-26 RX ADMIN — HYDROMORPHONE HYDROCHLORIDE 0.5 MILLIGRAM(S): 2 INJECTION INTRAMUSCULAR; INTRAVENOUS; SUBCUTANEOUS at 23:10

## 2020-11-26 NOTE — PROGRESS NOTE ADULT - SUBJECTIVE AND OBJECTIVE BOX
POD #3    24hr events:  - Chlorthalidone and losartan doses increased for elevated diastolics    Overnight events:   - No acute events    SUBJECTIVE:  Feels much better today. Endorses flatus but no BM. Tolerated clears and would like reg food. Has been OOB.    OBJECTIVE:  Vital Signs Last 24 Hrs  T(C): 37 (26 Nov 2020 06:04), Max: 37.2 (26 Nov 2020 01:07)  T(F): 98.6 (26 Nov 2020 06:04), Max: 98.9 (26 Nov 2020 01:07)  HR: 67 (26 Nov 2020 06:04) (62 - 80)  BP: 147/101 (26 Nov 2020 06:04) (120/89 - 175/107)  BP(mean): --  RR: 18 (26 Nov 2020 06:04) (17 - 18)  SpO2: 100% (26 Nov 2020 06:04) (98% - 100%)    Physical Examination:  GEN: NAD, resting quietly  PULM: symmetric chest rise bilaterally, no increased WOB  ABD: soft, nontender, nondistended, no rebound or guarding, incisions CDI  EXTR: no LE erythema, moving all extremities      LABS:                        12.1   7.87  )-----------( 224      ( 26 Nov 2020 05:56 )             36.1       11-26    133<L>  |  98  |  11  ----------------------------<  100<H>  4.0   |  25  |  1.31<H>    Ca    9.5      26 Nov 2020 05:56  Phos  3.1     11-26  Mg     2.0     11-26           POD #3       24hr events:  - Chlorthalidone and losartan doses increased for elevated diastolics    Overnight events:   - No acute events    SUBJECTIVE:  Feels much better today. Endorses flatus but no BM. Tolerated clears and would like reg food. Has been OOB.    OBJECTIVE:  Vital Signs Last 24 Hrs  T(C): 37 (26 Nov 2020 06:04), Max: 37.2 (26 Nov 2020 01:07)  T(F): 98.6 (26 Nov 2020 06:04), Max: 98.9 (26 Nov 2020 01:07)  HR: 67 (26 Nov 2020 06:04) (62 - 80)  BP: 147/101 (26 Nov 2020 06:04) (120/89 - 175/107)  BP(mean): --  RR: 18 (26 Nov 2020 06:04) (17 - 18)  SpO2: 100% (26 Nov 2020 06:04) (98% - 100%)    Physical Examination:  GEN: NAD, resting quietly  PULM: symmetric chest rise bilaterally, no increased WOB  ABD: soft, nontender, nondistended, no rebound or guarding, incisions CDI  EXTR: no LE erythema, moving all extremities      LABS:                        12.1   7.87  )-----------( 224      ( 26 Nov 2020 05:56 )             36.1       11-26    133<L>  |  98  |  11  ----------------------------<  100<H>  4.0   |  25  |  1.31<H>    Ca    9.5      26 Nov 2020 05:56  Phos  3.1     11-26  Mg     2.0     11-26

## 2020-11-26 NOTE — PROGRESS NOTE ADULT - SUBJECTIVE AND OBJECTIVE BOX
S: no chest pain or sob; ROS otherwise negative.     chief complaint: colon mass    chief complaint:    extended hpi: with > 4 characteristics:    Review of Systems:   Constitutional: [ ] fevers, [ ] chills.   Skin: [ ] dry skin. [ ] rashes.  Psychiatric: [ ] depression, [ ] anxiety.   Gastrointestinal: [ ] BRBPR, [ ] melena.   Neurological: [ ] confusion. [ ] seizures. [ ] shuffling gait.   Ears,Nose,Mouth and Throat: [ ] ear pain [ ] sore throat.   Eyes: [ ] diplopia.   Respiratory: [ ] hemoptysis. [ ] shortness of breath  Cardiovascular: See HPI above  Hematologic/Lymphatic: [ ] anemia. [ ] painful nodes. [ ] prolonged bleeding.   Genitourinary: [ ] hematuria. [ ] flank pain.   Endocrine: [ ] significant change in weight. [ ] intolerance to heat and cold.     Review of systems [ ] otherwise negative, [ ] otherwise unable to obtain    FH: no family history of sudden cardiac death in first degree relatives    SH: [ ] tobacco, [ ] alcohol, [ ] drugs    acetaminophen   Tablet .. 975 milliGRAM(s) Oral every 6 hours  atorvastatin 80 milliGRAM(s) Oral at bedtime  chlorthalidone 50 milliGRAM(s) Oral daily  diltiazem    Tablet 90 milliGRAM(s) Oral every 6 hours  enoxaparin Injectable 40 milliGRAM(s) SubCutaneous daily  HYDROmorphone  Injectable 0.5 milliGRAM(s) IV Push every 4 hours PRN  ibuprofen  Tablet. 400 milliGRAM(s) Oral every 6 hours PRN  losartan 100 milliGRAM(s) Oral daily  melatonin 3 milliGRAM(s) Oral at bedtime  nicotine -   7 mG/24Hr(s) Patch 1 patch Transdermal daily  oxyCODONE    IR 10 milliGRAM(s) Oral every 4 hours PRN  oxyCODONE    IR 5 milliGRAM(s) Oral every 4 hours PRN                            12.1   7.87  )-----------( 224      ( 26 Nov 2020 05:56 )             36.1       11-26    133<L>  |  98  |  11  ----------------------------<  100<H>  4.0   |  25  |  1.31<H>    Ca    9.5      26 Nov 2020 05:56  Phos  3.1     11-26  Mg     2.0     11-26      T(C): 36.9 (11-26-20 @ 11:03), Max: 37.2 (11-26-20 @ 01:07)  HR: 87 (11-26-20 @ 11:03) (62 - 87)  BP: 145/102 (11-26-20 @ 11:03) (120/89 - 147/101)  RR: 16 (11-26-20 @ 11:03) (16 - 18)  SpO2: 100% (11-26-20 @ 11:03) (98% - 100%)  Wt(kg): --    I&O's Summary    25 Nov 2020 07:01  -  26 Nov 2020 07:00  --------------------------------------------------------  IN: 504 mL / OUT: 950 mL / NET: -446 mL    26 Nov 2020 07:01  -  26 Nov 2020 13:05  --------------------------------------------------------  IN: 0 mL / OUT: 450 mL / NET: -450 mL        General: Well nourished in no acute distress. Alert and Oriented * 3.   Head: Normocephalic and atraumatic.   Neck: No JVD. No bruits. Supple. Does not appear to be enlarged.   Cardiovascular: + S1,S2 ; RRR Soft systolic murmur at the left lower sternal border. No rubs noted.    Lungs: CTA b/l. No rhonchi, rales or wheezes.   Abdomen: + BS, soft. Non tender. Non distended. No rebound. No guarding.   Extremities: No clubbing/cyanosis/edema.   Neurologic: Moves all four extremities. Full range of motion.   Skin: Warm and moist. The patient's skin has normal elasticity and good skin turgor.   Psychiatric: Appropriate mood and affect.  Musculoskeletal: Normal range of motion, normal strength        TELE: not on tele    A/P: 55M hx of HTN, tobacco abuse, CAD, s/p recent LHC 10/2020 at [facility] with 70% mRCA, medically managed for now, staged PCI planned post op, s/p  elective robotic assisted LAR with flex sigmoidoscopy for colonic mass (adenomatous/dysplastic lesion seen on colonoscopy 10/12).    -continue with medical management of known CAD  -pt. with no chest pain or anginal symptoms  -tolerated procedure well in terms of cv perspective  -Cont Cozaar and Chlorthalidone  -can change to Cardizem  at [place]  -no further cardiac workup anticipated at this time

## 2020-11-26 NOTE — PROGRESS NOTE ADULT - ASSESSMENT
54yo M POD3 s/p robotic-assisted sigmoid resection for a colonic mass with pre-op and post-op course complicated by hypertension that is improving. Patient stable with partial return of bowel function.    Plan:  - Advanced to LRD  - Pain control PRN  - Continue to monitor bowel function  - Monitor BPs. Will uptitrate meds as needed.  - F/u AM labs  - DVT ppx  - Dispo: home, no needs    D Team Surgery, [id number]

## 2020-11-27 ENCOUNTER — TRANSCRIPTION ENCOUNTER (OUTPATIENT)
Age: 55
End: 2020-11-27

## 2020-11-27 VITALS
SYSTOLIC BLOOD PRESSURE: 160 MMHG | TEMPERATURE: 98 F | RESPIRATION RATE: 18 BRPM | HEART RATE: 75 BPM | OXYGEN SATURATION: 100 % | DIASTOLIC BLOOD PRESSURE: 100 MMHG

## 2020-11-27 LAB
ANION GAP SERPL CALC-SCNC: 11 MMO/L — SIGNIFICANT CHANGE UP (ref 7–14)
BUN SERPL-MCNC: 21 MG/DL — SIGNIFICANT CHANGE UP (ref 7–23)
CALCIUM SERPL-MCNC: 9.3 MG/DL — SIGNIFICANT CHANGE UP (ref 8.4–10.5)
CHLORIDE SERPL-SCNC: 99 MMOL/L — SIGNIFICANT CHANGE UP (ref 98–107)
CO2 SERPL-SCNC: 23 MMOL/L — SIGNIFICANT CHANGE UP (ref 22–31)
CREAT SERPL-MCNC: 1.51 MG/DL — HIGH (ref 0.5–1.3)
GLUCOSE SERPL-MCNC: 96 MG/DL — SIGNIFICANT CHANGE UP (ref 70–99)
HCT VFR BLD CALC: 35.7 % — LOW (ref 39–50)
HGB BLD-MCNC: 11.8 G/DL — LOW (ref 13–17)
MAGNESIUM SERPL-MCNC: 1.9 MG/DL — SIGNIFICANT CHANGE UP (ref 1.6–2.6)
MCHC RBC-ENTMCNC: 29.1 PG — SIGNIFICANT CHANGE UP (ref 27–34)
MCHC RBC-ENTMCNC: 33.1 % — SIGNIFICANT CHANGE UP (ref 32–36)
MCV RBC AUTO: 87.9 FL — SIGNIFICANT CHANGE UP (ref 80–100)
NRBC # FLD: 0 K/UL — SIGNIFICANT CHANGE UP (ref 0–0)
PHOSPHATE SERPL-MCNC: 3.4 MG/DL — SIGNIFICANT CHANGE UP (ref 2.5–4.5)
PLATELET # BLD AUTO: 270 K/UL — SIGNIFICANT CHANGE UP (ref 150–400)
PMV BLD: 11.1 FL — SIGNIFICANT CHANGE UP (ref 7–13)
POTASSIUM SERPL-MCNC: 3.5 MMOL/L — SIGNIFICANT CHANGE UP (ref 3.5–5.3)
POTASSIUM SERPL-SCNC: 3.5 MMOL/L — SIGNIFICANT CHANGE UP (ref 3.5–5.3)
RBC # BLD: 4.06 M/UL — LOW (ref 4.2–5.8)
RBC # FLD: 13.2 % — SIGNIFICANT CHANGE UP (ref 10.3–14.5)
SODIUM SERPL-SCNC: 133 MMOL/L — LOW (ref 135–145)
WBC # BLD: 8.98 K/UL — SIGNIFICANT CHANGE UP (ref 3.8–10.5)
WBC # FLD AUTO: 8.98 K/UL — SIGNIFICANT CHANGE UP (ref 3.8–10.5)

## 2020-11-27 RX ORDER — DILTIAZEM HCL 120 MG
1 CAPSULE, EXT RELEASE 24 HR ORAL
Qty: 30 | Refills: 0
Start: 2020-11-27 | End: 2020-12-26

## 2020-11-27 RX ORDER — ATORVASTATIN CALCIUM 80 MG/1
1 TABLET, FILM COATED ORAL
Qty: 30 | Refills: 0
Start: 2020-11-27 | End: 2020-12-26

## 2020-11-27 RX ORDER — METOPROLOL TARTRATE 50 MG
1 TABLET ORAL
Qty: 0 | Refills: 0 | DISCHARGE

## 2020-11-27 RX ORDER — ACETAMINOPHEN 500 MG
3 TABLET ORAL
Qty: 0 | Refills: 0 | DISCHARGE
Start: 2020-11-27

## 2020-11-27 RX ORDER — CHLORTHALIDONE 50 MG
1 TABLET ORAL
Qty: 30 | Refills: 0
Start: 2020-11-27 | End: 2020-12-26

## 2020-11-27 RX ORDER — CHLORTHALIDONE 50 MG
25 TABLET ORAL DAILY
Refills: 0 | Status: DISCONTINUED | OUTPATIENT
Start: 2020-11-27 | End: 2020-11-27

## 2020-11-27 RX ORDER — OXYCODONE HYDROCHLORIDE 5 MG/1
1 TABLET ORAL
Qty: 18 | Refills: 0
Start: 2020-11-27 | End: 2020-11-29

## 2020-11-27 RX ORDER — LOSARTAN POTASSIUM 100 MG/1
1 TABLET, FILM COATED ORAL
Qty: 0 | Refills: 0 | DISCHARGE

## 2020-11-27 RX ORDER — ROSUVASTATIN CALCIUM 5 MG/1
1 TABLET ORAL
Qty: 30 | Refills: 0
Start: 2020-11-27 | End: 2020-12-26

## 2020-11-27 RX ORDER — LOSARTAN POTASSIUM 100 MG/1
1 TABLET, FILM COATED ORAL
Qty: 30 | Refills: 0
Start: 2020-11-27 | End: 2020-12-26

## 2020-11-27 RX ADMIN — Medication 90 MILLIGRAM(S): at 12:09

## 2020-11-27 RX ADMIN — OXYCODONE HYDROCHLORIDE 10 MILLIGRAM(S): 5 TABLET ORAL at 12:16

## 2020-11-27 RX ADMIN — Medication 50 MILLIGRAM(S): at 05:39

## 2020-11-27 RX ADMIN — LOSARTAN POTASSIUM 100 MILLIGRAM(S): 100 TABLET, FILM COATED ORAL at 05:39

## 2020-11-27 RX ADMIN — Medication 975 MILLIGRAM(S): at 06:32

## 2020-11-27 RX ADMIN — OXYCODONE HYDROCHLORIDE 10 MILLIGRAM(S): 5 TABLET ORAL at 13:34

## 2020-11-27 RX ADMIN — Medication 975 MILLIGRAM(S): at 13:33

## 2020-11-27 RX ADMIN — Medication 975 MILLIGRAM(S): at 12:09

## 2020-11-27 RX ADMIN — Medication 90 MILLIGRAM(S): at 05:39

## 2020-11-27 RX ADMIN — Medication 975 MILLIGRAM(S): at 05:39

## 2020-11-27 RX ADMIN — Medication 1 PATCH: at 07:31

## 2020-11-27 RX ADMIN — Medication 1 PATCH: at 11:59

## 2020-11-27 RX ADMIN — Medication 1 PATCH: at 12:08

## 2020-11-27 RX ADMIN — ENOXAPARIN SODIUM 40 MILLIGRAM(S): 100 INJECTION SUBCUTANEOUS at 12:08

## 2020-11-27 NOTE — PROGRESS NOTE ADULT - ASSESSMENT
54yo M POD4 s/p robotic-assisted sigmoid resection for a colonic mass with pre-op and post-op course complicated by hypertension that is improving. Patient stable with partial return of bowel function.    Plan:  - Advanced to LRD, tolerating well  - Pain control PRN  - Cozaar increased yesterday, now with rising Cr  - Will follow up with Cardiology regarding BP meds for discharge  - Check Urine lytes, recheck PM Cr  - F/u AM labs  - DVT ppx  - Dispo: home, no needs    D Team Surgery, [id number]

## 2020-11-27 NOTE — DISCHARGE NOTE PROVIDER - NSDCFUADDINST_GEN_ALL_CORE_FT
WOUND CARE: Keep wounds clean, dry and intact  BATHING: Please do not submerge wound underwater. You may shower and/or sponge bathe.  ACTIVITY: No heavy lifting or straining. Resume activities of daily living. Do not lift heavy weights (greater than 15 pounds) or engage in strenuous exercise until you see your doctor in the office in 1-2 weeks. You may shower today, just let the water run over the wound, no scrubbing. No immersion baths or swimming in pools or ocean until you see us in the office again. Please leave the steri-strips (small white bandaids) on. These will remain on and fall off by themselves in the next few weeks. If you are taking narcotic pain medication (such as Percocet), do NOT drive a car, operate machinery or make important decisions.  DIET: Return to your usual diet.  NOTIFY YOUR SURGEON IF: You have any bleeding that does not stop, any pus draining from your wound, any fever (over 100.4 F) or chills, persistent nausea/vomiting, persistent diarrhea, or if your pain is not controlled on your discharge pain medications.  FOLLOW-UP:  1. Please call to make a follow-up appointment within one week of discharge  2. Please follow up with your primary care provider in one week regarding your hospitalization, please bring all discharge paperwork with you to this follow up appointment.

## 2020-11-27 NOTE — DISCHARGE NOTE PROVIDER - PROVIDER TOKENS
PROVIDER:[TOKEN:[[id number]]],PROVIDER:[TOKEN:[[id number]]],PROVIDER:[TOKEN:[[id number]]]

## 2020-11-27 NOTE — CONSULT NOTE ADULT - ASSESSMENT
55M hx of HTN, tobacco abuse, CAD, s/p recent LHC 10/2020 at [facility] with 70% mRCA, medically managed for now, staged PCI planned post op, s/p  elective robotic assisted LAR with flex sigmoidoscopy for colonic mass (adenomatous/dysplastic lesion seen on colonoscopy 10/12).  POD #4 55M hx of HTN, tobacco abuse, CAD, s/p recent LHC 10/2020 at [facility] with 70% mRCA, medically managed for now, staged PCI planned post op, s/p  elective robotic assisted LAR with flex sigmoidoscopy for colonic mass (adenomatous/dysplastic lesion seen on colonoscopy 10/12);POD #4   CAESAR based on Vanesa criteria    1 Renal-CAESAR is likely pre-renal azotemia likely from diuretic.  At his age group volume status is not as important for BP control   No need for renal sono  Check UA at some point    2 CVS-BP is still elevated based on increased MAP BUT improved from admission;  No need for additional meds  Reduce  water pill chlorthalidone to 25mg po qd     3 Misc- Ambulate     No renal objection to dc home     Sayed [facility]   [phone]

## 2020-11-27 NOTE — DISCHARGE NOTE PROVIDER - NSDCCPCAREPLAN_GEN_ALL_CORE_FT
PRINCIPAL DISCHARGE DIAGNOSIS  Diagnosis: Colonic mass  Assessment and Plan of Treatment: You had a portion of your colon removed. Follow up pathology results in the office.      SECONDARY DISCHARGE DIAGNOSES  Diagnosis: HTN (hypertension)  Assessment and Plan of Treatment: Your medication regimen was adjusted while in the hospital. Please continue taking medications as prescribed on discharge. It is important you follow up with your primary care provider, cardiologist and nephrologist on discharge.

## 2020-11-27 NOTE — DISCHARGE NOTE PROVIDER - HOSPITAL COURSE
55M hx of HTN, CAD presented for elective robotic assisted LAR with flex sigmoidoscopy today for colonic mass (adenomatous/dysplastic lesion seen on colonoscopy 10/12). Patient was brought to the OR and was found to have hypertensive urgency with diastolic BP in 120s. OR case was cancelled and patient was sent to the ED. Patient received home PO antihypertensives with persistent hypertension. Cardiology consult placed. His medication regimen was adjusted.     On 11/23 patient underwent robotic assisted Sigmoidectomy in the OR. The patient tolerated the procedure well. Post-operatively the patient was sent to the PACU. The patient was hemodynamically stable and was transferred to a surgical floor. The patient had daily wound care. The patient's pain was controlled by IV pain medications and then by PO pain medications. The patient was advanced to a regular diet and tolerated it well. The patient was placed on home medications. Hypokalemia, hypomagnesemia and hypophosphatemia were corrected. At the time of discharge, the patient was hemodynamically stable, was tolerating PO diet, was voiding urine and passing stool, was ambulating, and was comfortable with adequate pain control. The patient had a mild CAESAR secondary to medication adjustments. Nephrology was consulted and decreased his chlorthalidone and recommended outpatient follow up. The patient was instructed to follow up with Dr. Bermudez within 7-10 days after discharge from the hospital. The patient felt comfortable with discharge. The patient had no other issues.

## 2020-11-27 NOTE — DISCHARGE NOTE PROVIDER - NSDCCPTREATMENT_GEN_ALL_CORE_FT
PRINCIPAL PROCEDURE  Procedure: Robot-assisted surgical removal of sigmoid colon using da Rosa Xi  Findings and Treatment: WOUND CARE: Keep incisions clean & dry  BATHING: Please do not submerge wound underwater. You may shower and/or sponge bathe.  ACTIVITY: No heavy lifting or straining. Resume activities of daily living. Do not lift heavy weights (greater than 15 pounds) or engage in strenuous exercise until you see your doctor in the office in 1-2 weeks. You may shower today, just let the water run over the wound, no scrubbing. No immersion baths or swimming in pools or ocean until you see us in the office again. Please leave the steri-strips (small white bandaids) on. These will remain on and fall off by themselves in the next few weeks. If you are taking narcotic pain medication (such as Percocet), do NOT drive a car, operate machinery or make important decisions.  DIET: Return to your usual diet.  NOTIFY YOUR SURGEON IF: You have any bleeding that does not stop, any pus draining from your wound, any fever (over 100.4 F) or chills, persistent nausea/vomiting, persistent diarrhea, or if your pain is not controlled on your discharge pain medications.  FOLLOW-UP:  1. Please call to make a follow-up appointment within one week of discharge  2. Please follow up with your primary care provider in one week regarding your hospitalization, please bring all discharge paperwork with you to this follow up appointment.

## 2020-11-27 NOTE — CONSULT NOTE ADULT - SUBJECTIVE AND OBJECTIVE BOX
NEPHROLOGY - NSN    Patient seen and examined.    HPI:                                                                                          Surgical team: D TEAM SURGERY  Attending: Dr. Bermudez    HPI:  55M hx of HTN, CAD presented for elective robotic assisted LAR with flex sigmoidoscopy today for colonic mass (adenomatous/dysplastic lesion seen on colonoscopy 10/12). Patient was brought to the OR and was found to have hypertensive urgency with diastolic BP in 120s. OR case was cancelled and patient was sent to the ED. Patient received home PO antihypertensives with persistent hypertension.  Recent diagnostic cardiac cath for cardiac clearance with Dr. Loza 10/26.  POD #4 at present for robotic LAR   Serum creatinine fluctuating at present but the BP is improving           PAST MEDICAL HISTORY:  CAD S/P percutaneous coronary angioplasty    CAD (coronary artery disease)    Ingrown toenail    Colonic mass    No significant past medical history    HTN (hypertension)        PAST SURGICAL HISTORY:  No significant past surgical history    No significant past surgical history    Ingrowing toenail    Ingrowing left great toenail        MEDICATIONS:  enoxaparin Injectable 40 milliGRAM(s) SubCutaneous daily  labetalol Injectable 10 milliGRAM(s) IV Push once      ALLERGIES:  No Known Allergies      VITALS & I/Os:  Vital Signs Last 24 Hrs  T(C): 36.6 (19 Nov 2020 09:26), Max: 36.7 (19 Nov 2020 06:51)  T(F): 97.8 (19 Nov 2020 09:26), Max: 98.1 (19 Nov 2020 06:51)  HR: 85 (19 Nov 2020 09:52) (70 - 85)  BP: 166/137 (19 Nov 2020 09:52) (150/80 - 170/107)  BP(mean): 103 (18 Nov 2020 11:04) (103 - 103)  RR: 17 (19 Nov 2020 09:52) (16 - 20)  SpO2: 100% (19 Nov 2020 09:52) (98% - 100%)    I&O's Summary      PHYSICAL EXAM:  General: No acute distress  Respiratory: Nonlabored  Cardiovascular: RRR  Abdominal: Soft, nondistended, nontender. No rebound or guarding.   Extremities: Warm, moving all extremities    LABS:                        12.7   9.71  )-----------( 265      ( 19 Nov 2020 10:00 )             37.5     11-19    139  |  109<H>  |  7   ----------------------------<  100<H>  4.0   |  20<L>  |  1.06    Ca    8.9      19 Nov 2020 10:00    TPro  6.3  /  Alb  4.2  /  TBili  0.5  /  DBili  x   /  AST  22  /  ALT  14  /  AlkPhos  125<H>  11-19    Lactate:                  IMAGING:   (19 Nov 2020 10:52)      PAST MEDICAL & SURGICAL HISTORY:  Smoker    CAD S/P percutaneous coronary angioplasty  10/26/2020    Ingrown toenail    Colonic mass    HTN (hypertension)    No significant past surgical history        MEDICATIONS  (STANDING):  acetaminophen   Tablet .. 975 milliGRAM(s) Oral every 6 hours  atorvastatin 80 milliGRAM(s) Oral at bedtime  chlorthalidone 50 milliGRAM(s) Oral daily  diltiazem    Tablet 90 milliGRAM(s) Oral every 6 hours  enoxaparin Injectable 40 milliGRAM(s) SubCutaneous daily  losartan 100 milliGRAM(s) Oral daily  melatonin 3 milliGRAM(s) Oral at bedtime  nicotine -   7 mG/24Hr(s) Patch 1 patch Transdermal daily      Allergies    No Known Allergies    Intolerances        SOCIAL HISTORY:  Denies alcohol abuse, drug abuse or tobacco usage.     FAMILY HISTORY:      VITALS:  T(C): 37 (11-27-20 @ 08:58), Max: 37.3 (11-26-20 @ 20:48)  HR: 81 (11-27-20 @ 08:58) (71 - 85)  BP: 134/104 (11-27-20 @ 08:58) (134/104 - 145/98)  RR: 17 (11-27-20 @ 08:58) (16 - 18)  SpO2: 100% (11-27-20 @ 08:58) (98% - 100%)    REVIEW OF SYSTEMS:  Denies any nausea, vomiting, diarrhea, fever or chills. Denies chest pain, SOB, focal weakness, hematuria or dysuria. Good oral intake and denies fatigue or weakness. All other pertinent systems are reviewed and are negative.    PHYSICAL EXAM:  Constitutional: NAD  HEENT: EOMI  Neck:  No JVD, supple   Respiratory: CTA B/L  Cardiovascular: S1 and S2, RRR  Gastrointestinal: + BS, soft, NT, ND  Extremities: No peripheral edema, + peripheral pulses  Neurological: A/O x 3, CN2-12 intact  Psychiatric: Normal mood, normal affect  : No Les  Skin: No óscar, C/D/I  Access: Not applicable    I and O's:    11-25 @ 07:01  -  11-26 @ 07:00  --------------------------------------------------------  IN: 504 mL / OUT: 950 mL / NET: -446 mL    11-26 @ 07:01  -  11-27 @ 07:00  --------------------------------------------------------  IN: 0 mL / OUT: 850 mL / NET: -850 mL          LABS:                        11.8   8.98  )-----------( 270      ( 27 Nov 2020 05:57 )             35.7     11-27    133<L>  |  99  |  21  ----------------------------<  96  3.5   |  23  |  1.51<H>    Ca    9.3      27 Nov 2020 05:57  Phos  3.4     11-27  Mg     1.9     11-27        URINE:      RADIOLOGY & ADDITIONAL STUDIES:   NEPHROLOGY - NSN    Patient seen and examined.    HPI:                                                                                          Surgical team: D TEAM SURGERY  Attending: Dr. Bermudez    HPI:  55M hx of HTN, CAD presented for elective robotic assisted LAR with flex sigmoidoscopy today for colonic mass (adenomatous/dysplastic lesion seen on colonoscopy 10/12). Patient was brought to the OR and was found to have hypertensive urgency with diastolic BP in 120s. OR case was cancelled and patient was sent to the ED. Patient received home PO antihypertensives with persistent hypertension.  Recent diagnostic cardiac cath for cardiac clearance with Dr. Loza 10/26.  POD #4 at present for robotic LAR   Serum creatinine fluctuating at present but the BP is improving   He was unaware of his BP and did not check at home.  He states that he does no drug and occasional marijuana.  No hx of DM. He is unaware issues related to the kidneys   There is no hematuria or bubbles in the urine.  No history of NSAIDS or nephrolithisis.  The patient urinates once or twice in the night and there is no incontinence.  No family hx or renal disease or back pain.    No recent abx use.  No alleviating or aggravating factors with respect to the kidneys.            PAST MEDICAL HISTORY:  CAD S/P percutaneous coronary angioplasty    CAD (coronary artery disease)    Ingrown toenail    Colonic mass    No significant past medical history    HTN (hypertension)        PAST SURGICAL HISTORY:  No significant past surgical history    No significant past surgical history    Ingrowing toenail    Ingrowing left great toenail        MEDICATIONS:  enoxaparin Injectable 40 milliGRAM(s) SubCutaneous daily  labetalol Injectable 10 milliGRAM(s) IV Push once      ALLERGIES:  No Known Allergies      VITALS & I/Os:  Vital Signs Last 24 Hrs  T(C): 36.6 (19 Nov 2020 09:26), Max: 36.7 (19 Nov 2020 06:51)  T(F): 97.8 (19 Nov 2020 09:26), Max: 98.1 (19 Nov 2020 06:51)  HR: 85 (19 Nov 2020 09:52) (70 - 85)  BP: 166/137 (19 Nov 2020 09:52) (150/80 - 170/107)  BP(mean): 103 (18 Nov 2020 11:04) (103 - 103)  RR: 17 (19 Nov 2020 09:52) (16 - 20)  SpO2: 100% (19 Nov 2020 09:52) (98% - 100%)    I&O's Summary      PHYSICAL EXAM:  General: No acute distress  Respiratory: Nonlabored  Cardiovascular: RRR  Abdominal: Soft, nondistended, nontender. No rebound or guarding.   Extremities: Warm, moving all extremities    LABS:                        12.7   9.71  )-----------( 265      ( 19 Nov 2020 10:00 )             37.5         139  |  109<H>  |  21  ----------------------------<  100<H>  4.0   |  20<L>  |  1.51    Ca    8.9      19 Nov 2020 10:00    TPro  6.3  /  Alb  4.2  /  TBili  0.5  /  DBili  x   /  AST  22  /  ALT  14  /  AlkPhos  125<H>  11-19    Lactate:                  IMAGING:   (19 Nov 2020 10:52)      PAST MEDICAL & SURGICAL HISTORY:  Smoker    CAD S/P percutaneous coronary angioplasty  10/26/2020    Ingrown toenail    Colonic mass    HTN (hypertension)    No significant past surgical history        MEDICATIONS  (STANDING):  acetaminophen   Tablet .. 975 milliGRAM(s) Oral every 6 hours  atorvastatin 80 milliGRAM(s) Oral at bedtime  chlorthalidone 50 milliGRAM(s) Oral daily  diltiazem    Tablet 90 milliGRAM(s) Oral every 6 hours  enoxaparin Injectable 40 milliGRAM(s) SubCutaneous daily  losartan 100 milliGRAM(s) Oral daily  melatonin 3 milliGRAM(s) Oral at bedtime  nicotine -   7 mG/24Hr(s) Patch 1 patch Transdermal daily      Allergies6    No Known Allergies    Intolerances        SOCIAL HISTORY:  Denies alcohol abuse, drug abuse or tobacco usage.     FAMILY HISTORY:      VITALS:  T(C): 37 (11-27-20 @ 08:58), Max: 37.3 (11-26-20 @ 20:48)  HR: 81 (11-27-20 @ 08:58) (71 - 85)  BP: 134/104 (11-27-20 @ 08:58) (134/104 - 145/98)  RR: 17 (11-27-20 @ 08:58) (16 - 18)  SpO2: 100% (11-27-20 @ 08:58) (98% - 100%)    REVIEW OF SYSTEMS:  Denies any nausea, vomiting, diarrhea, fever or chills. Denies chest pain, SOB, focal weakness, hematuria or dysuria. Good oral intake and denies fatigue or weakness. All other pertinent systems are reviewed and are negative.    PHYSICAL EXAM:  Constitutional: NAD  HEENT: EOMI  Neck:  No JVD, supple   Respiratory: CTA B/L  Cardiovascular: S1 and S2, RRR  Gastrointestinal: + BS, soft, NT, ND  Extremities: No peripheral edema, + peripheral pulses  Neurological: A/O x 3, CN2-12 intact  Psychiatric: Normal mood, normal affect  : No Saldana  Skin: No rashes, C/D/I  Access: Not applicable    I and O's:    11-25 @ 07:01  -  11-26 @ 07:00  --------------------------------------------------------  IN: 504 mL / OUT: 950 mL / NET: -446 mL    11-26 @ 07:01  -  11-27 @ 07:00  --------------------------------------------------------  IN: 0 mL / OUT: 850 mL / NET: -850 mL          LABS:                        11.8   8.98  )-----------( 270      ( 27 Nov 2020 05:57 )             35.7     11-27    133<L>  |  99  |  21  ----------------------------<  96  3.5   |  23  |  1.51<H>    Ca    9.3      27 Nov 2020 05:57  Phos  3.4     11-27  Mg     1.9     11-27        URINE:      RADIOLOGY & ADDITIONAL STUDIES:   < from: Xray Chest 1 View- PORTABLE-Urgent (Xray Chest 1 View- PORTABLE-Urgent .) (11.23.20 @ 00:01) >      < end of copied text >

## 2020-11-27 NOTE — PROGRESS NOTE ADULT - REASON FOR ADMISSION
colon mass, hypertensive urgency

## 2020-11-27 NOTE — DISCHARGE NOTE PROVIDER - NSDCFUADDAPPT_GEN_ALL_CORE_FT
Please call to schedule a follow up appointment with Dr. Bermudez within one week of discharge.    Please call to schedule a follow up appointment with Dr. Smiley (Nephrology) within one week of discharge.    Please call to schedule a follow up appointment with your Cardiologist within one week of discharge.

## 2020-11-27 NOTE — DISCHARGE NOTE PROVIDER - CARE PROVIDER_API CALL
Socrates Ortiz)  Surgery  [address], Division [place] [address]  Phone: [phone]  Fax: [phone]  Follow Up Time:     Ivette Smiley)  Internal Medicine; Nephrology  [address], [address]  [place] [address]  Phone: [phone]  Fax: [phone]  Follow Up Time:     Nga Delvalle  CARDIOVASCULAR DISEASE  [address] [place] [address]  Phone: [phone]  Fax: [phone]  Follow Up Time:

## 2020-11-27 NOTE — DISCHARGE NOTE PROVIDER - NSDCMRMEDTOKEN_GEN_ALL_CORE_FT
acetaminophen 325 mg oral tablet: 3 tab(s) orally every 6 hours  atorvastatin 80 mg oral tablet: 1 tab(s) orally once a day (at bedtime)  Cardizem  mg/24 hours oral tablet, extended release: 1 tab(s) orally once a day   chlorthalidone 25 mg oral tablet: 1 tab(s) orally once a day  enoxaparin 40 mg/0.4 mL injectable solution: 40 milligram(s) subcutaneously once a day   losartan 100 mg oral tablet: 1 tab(s) orally once a day  Multi Vitamin+: 1 gum orally 3 to 4 times a week, LAST DOSE 11/17/20  oxyCODONE 5 mg oral tablet: 1 tab(s) orally every 4 hours, As needed, Moderate Pain (4 - 6) MDD:6   acetaminophen 325 mg oral tablet: 3 tab(s) orally every 6 hours  Cardizem  mg/24 hours oral tablet, extended release: 1 tab(s) orally once a day   chlorthalidone 25 mg oral tablet: 1 tab(s) orally once a day  enoxaparin 40 mg/0.4 mL injectable solution: 40 milligram(s) subcutaneously once a day   losartan 100 mg oral tablet: 1 tab(s) orally once a day  Multi Vitamin+: 1 gum orally 3 to 4 times a week, LAST DOSE 11/17/20  oxyCODONE 5 mg oral tablet: 1 tab(s) orally every 4 hours, As needed, Moderate Pain (4 - 6) MDD:6  rosuvastatin 40 mg oral tablet: 1 tab(s) orally once a day (at bedtime)

## 2020-11-27 NOTE — DISCHARGE NOTE NURSING/CASE MANAGEMENT/SOCIAL WORK - PATIENT PORTAL LINK FT
You can access the FollowMyHealth Patient Portal offered by [facility] by registering at the following website: [email or web address]. By joining [facility]’s FollowMyHealth portal, you will also be able to view your health information using other applications (apps) compatible with our system.

## 2020-11-27 NOTE — DISCHARGE NOTE PROVIDER - CARE PROVIDERS DIRECT ADDRESSES
[email or web address]

## 2020-11-27 NOTE — PROGRESS NOTE ADULT - SUBJECTIVE AND OBJECTIVE BOX
S: no chest pain or sob; ROS otherwise negative.     chief complaint: colon mass      Review of Systems:   Constitutional: [ ] fevers, [ ] chills.   Skin: [ ] dry skin. [ ] rashes.  Psychiatric: [ ] depression, [ ] anxiety.   Gastrointestinal: [ ] BRBPR, [ ] melena.   Neurological: [ ] confusion. [ ] seizures. [ ] shuffling gait.   Ears,Nose,Mouth and Throat: [ ] ear pain [ ] sore throat.   Eyes: [ ] diplopia.   Respiratory: [ ] hemoptysis. [ ] shortness of breath  Cardiovascular: See HPI above  Hematologic/Lymphatic: [ ] anemia. [ ] painful nodes. [ ] prolonged bleeding.   Genitourinary: [ ] hematuria. [ ] flank pain.   Endocrine: [ ] significant change in weight. [ ] intolerance to heat and cold.     Review of systems [x ] otherwise negative, [ ] otherwise unable to obtain    FH: no family history of sudden cardiac death in first degree relatives    SH: [ ] tobacco, [ ] alcohol, [ ] drugs      acetaminophen   Tablet .. 975 milliGRAM(s) Oral every 6 hours  atorvastatin 80 milliGRAM(s) Oral at bedtime  chlorthalidone 50 milliGRAM(s) Oral daily  diltiazem    Tablet 90 milliGRAM(s) Oral every 6 hours  enoxaparin Injectable 40 milliGRAM(s) SubCutaneous daily  HYDROmorphone  Injectable 0.5 milliGRAM(s) IV Push every 4 hours PRN  losartan 100 milliGRAM(s) Oral daily  melatonin 3 milliGRAM(s) Oral at bedtime  nicotine -   7 mG/24Hr(s) Patch 1 patch Transdermal daily  oxyCODONE    IR 10 milliGRAM(s) Oral every 4 hours PRN  oxyCODONE    IR 5 milliGRAM(s) Oral every 4 hours PRN                            11.8   8.98  )-----------( 270      ( 27 Nov 2020 05:57 )             35.7       11-27    133<L>  |  99  |  21  ----------------------------<  96  3.5   |  23  |  1.51<H>    Ca    9.3      27 Nov 2020 05:57  Phos  3.4     11-27  Mg     1.9     11-27              T(C): 37 (11-27-20 @ 08:58), Max: 37.3 (11-26-20 @ 20:48)  HR: 81 (11-27-20 @ 08:58) (71 - 85)  BP: 134/104 (11-27-20 @ 08:58) (134/104 - 145/98)  RR: 17 (11-27-20 @ 08:58) (16 - 18)  SpO2: 100% (11-27-20 @ 08:58) (98% - 100%)  Wt(kg): --    I&O's Summary    26 Nov 2020 07:01  -  27 Nov 2020 07:00  --------------------------------------------------------  IN: 0 mL / OUT: 850 mL / NET: -850 mL          General: Well nourished in no acute distress. Alert and Oriented * 3.   Head: Normocephalic and atraumatic.   Neck: No JVD. No bruits. Supple. Does not appear to be enlarged.   Cardiovascular: + S1,S2 ; RRR Soft systolic murmur at the left lower sternal border. No rubs noted.    Lungs: CTA b/l. No rhonchi, rales or wheezes.   Abdomen: + BS, soft. Non tender. Non distended. No rebound. No guarding.   Extremities: No clubbing/cyanosis/edema.   Neurologic: Moves all four extremities. Full range of motion.   Skin: Warm and moist. The patient's skin has normal elasticity and good skin turgor.   Psychiatric: Appropriate mood and affect.  Musculoskeletal: Normal range of motion, normal strength        TELE: not on tele    A/P: 55M hx of HTN, tobacco abuse, CAD, s/p recent LHC 10/2020 at [facility] with 70% mRCA, medically managed for now, staged PCI planned post op, s/p  elective robotic assisted LAR with flex sigmoidoscopy for colonic mass (adenomatous/dysplastic lesion seen on colonoscopy 10/12).    -continue with medical management of known CAD  -pt. with no chest pain or anginal symptoms  -tolerated procedure well in terms of cv perspective  -pt. bp has improved however patient with CAESAR   -renal consultation with Dr. Smiley called - may need to hold cozaar   -follow up renal  -further workup pending above    Nga Delvalle MD

## 2020-11-27 NOTE — PROGRESS NOTE ADULT - SUBJECTIVE AND OBJECTIVE BOX
D TEAM SURGERY PROGRESS NOTE    POST OPERATIVE DAY #: 4 s/p robotic-assisted sigmoid resection for a colonic mass    SUBJECTIVE: Patient seen and examined at bedside on AM rounds, patient without complaints. Tolerating regular diet, eager to go home. Has been ambulating independently. Pain is adequately controlled on current regimen. Patient is passing flatus and having bowel movements. Denies chest pain/SOB. Denies nausea/emesis.    Vital Signs Last 24 Hrs  T(C): 37 (27 Nov 2020 05:35), Max: 37.3 (26 Nov 2020 20:48)  T(F): 98.6 (27 Nov 2020 05:35), Max: 99.1 (26 Nov 2020 20:48)  HR: 75 (27 Nov 2020 05:35) (71 - 87)  BP: 145/98 (27 Nov 2020 05:35) (136/101 - 145/102)  BP(mean): --  RR: 18 (27 Nov 2020 05:35) (16 - 18)  SpO2: 100% (27 Nov 2020 05:35) (98% - 100%)  I&O's Detail    26 Nov 2020 07:01  -  27 Nov 2020 07:00  --------------------------------------------------------  IN:  Total IN: 0 mL    OUT:    Voided (mL): 850 mL  Total OUT: 850 mL    Total NET: -850 mL    MEDICATIONS  (STANDING):  acetaminophen   Tablet .. 975 milliGRAM(s) Oral every 6 hours  atorvastatin 80 milliGRAM(s) Oral at bedtime  chlorthalidone 50 milliGRAM(s) Oral daily  diltiazem    Tablet 90 milliGRAM(s) Oral every 6 hours  enoxaparin Injectable 40 milliGRAM(s) SubCutaneous daily  losartan 100 milliGRAM(s) Oral daily  melatonin 3 milliGRAM(s) Oral at bedtime  nicotine -   7 mG/24Hr(s) Patch 1 patch Transdermal daily    MEDICATIONS  (PRN):  HYDROmorphone  Injectable 0.5 milliGRAM(s) IV Push every 4 hours PRN breakthrough pain  oxyCODONE    IR 10 milliGRAM(s) Oral every 4 hours PRN Severe Pain (7 - 10)  oxyCODONE    IR 5 milliGRAM(s) Oral every 4 hours PRN Moderate Pain (4 - 6)      Physical Exam  General: A&Ox3, NAD  Respiratory: Clear bilaterally, equal bilateral expansion  Cardiovascular: Regular rate & rhythm  Abdominal: soft, nontender, nondistended, no rebound or guarding, incisions CDI    LABS:                        11.8   8.98  )-----------( 270      ( 27 Nov 2020 05:57 )             35.7     11-27    133<L>  |  99  |  21  ----------------------------<  96  3.5   |  23  |  1.51<H>    Ca    9.3      27 Nov 2020 05:57  Phos  3.4     11-27  Mg     1.9     11-27

## 2020-12-01 LAB — SURGICAL PATHOLOGY STUDY: SIGNIFICANT CHANGE UP

## 2020-12-02 PROBLEM — F17.200 NICOTINE DEPENDENCE, UNSPECIFIED, UNCOMPLICATED: Chronic | Status: ACTIVE | Noted: 2020-11-19

## 2020-12-15 ENCOUNTER — APPOINTMENT (OUTPATIENT)
Dept: SURGICAL ONCOLOGY | Facility: CLINIC | Age: 55
End: 2020-12-15
Payer: MEDICAID

## 2020-12-15 VITALS
HEIGHT: 67 IN | BODY MASS INDEX: 20.56 KG/M2 | HEART RATE: 80 BPM | DIASTOLIC BLOOD PRESSURE: 85 MMHG | SYSTOLIC BLOOD PRESSURE: 125 MMHG | WEIGHT: 131 LBS

## 2020-12-15 PROCEDURE — 99024 POSTOP FOLLOW-UP VISIT: CPT

## 2020-12-22 NOTE — PHYSICAL EXAM
[Normal] : normal breast inspection and palpation of axillas [Normal Male] : prostate smooth, symmetric with no modularity or induration [Normal] : oriented to person, place and time, with appropriate affect [FreeTextEntry1] : COVID -19 precautions as per [facility] policy was universally followed.  [de-identified] : Abdomen soft, non-tender, non- distended, and no palpable masses. port sites healing well\par

## 2020-12-22 NOTE — CONSULT LETTER
[Dear  ___] : Dear  [unfilled], [Consult Letter:] : I had the pleasure of evaluating your patient, [unfilled]. [Please see my note below.] : Please see my note below. [Consult Closing:] : Thank you very much for allowing me to participate in the care of this patient.  If you have any questions, please do not hesitate to contact me. [Sincerely,] : Sincerely, [DrBubba  ___] : Dr. RAMOS [( Thank you for referring [unfilled] for consultation for _____ )] : Thank you for referring [unfilled] for consultation for [unfilled] [FreeTextEntry2] : Dr. Awais Peres\par [address] [facility], [place] [address]\par [phone]\par \par Cardiologist:\par Dr. Milo Loza\par [address] [address], [place] [address]\par [phone] [FreeTextEntry3] : Socrates Bermudez MD, FICS, FACS\par , Surgical Oncology\par [facility] [employer] [employer] [facility]\par [address]\[place]- [address]\par \par [address]\[place]- [address]\par \par (mob) [phone]\par (o) [phone]\par (f) [phone]

## 2020-12-22 NOTE — ASSESSMENT
[FreeTextEntry1] : Assessment;\par Mr. Katiana Gotti is a 56 y/o Male d/p robotic sigmoid resection-final pathology- benign polypt, no dysplasia or malignancy\par --------------------------------\par PLAN:\par - RTO as needed\par Cont follow up with \par I have discussed the diagnosis, therapeutic plan and options with the patient at length. Patient expressed verbal understanding to proceed with the proposed plan. All questions answered. \par \par \par \par \par \par

## 2020-12-22 NOTE — HISTORY OF PRESENT ILLNESS
[de-identified] : Mr. Katiana Gotti is a 54 y/o Male presented today as a followup for a large colon polyp- not amenable for endocospic resection by Dr. Awais Peres. He is s/p Robotic sigmoid colectomy completed 11/23/20 with pathology revealing Tubular adenoma with prior biopsy site changes and tattoo pigment.- Background colon shows diverticulosis and peridiverticulitis. - Negative for high-grade dysplasia or carcinoma.- Twenty-six reactive lymph nodes. - Resection margins negative for dysplasia.\par \par Today 12/15/20: Overall pt is doing well post operatively and Incision is healing well. No evidence of hematoma, seroma, or infection are present as well. Today pt was w/o any complaints. Denies constitutional symptoms. \par \par Recent imaging: \par - His previous colonoscopy 08/ \par \par PMHX:\par - HTN\par - Active smoker: 6-7 cigarettes daily - about 30 yrs now.\par - Marjiuana user: started in mid september 2020 occassionally. \par - pt is a Lee. \par \par \par PCP: \par Dr. Awais Peres\[place] [address] [place] [address]\par [phone]\par \par Cardiologist:\par Dr. Milo Loza\par [address] [address], [place] [address]\par [phone]

## 2020-12-22 NOTE — ADDENDUM
[FreeTextEntry1] : I, Agnes Garrett, acted solely as a scribe for Dr. Bermudez on this date 12/15/20

## 2021-03-04 ENCOUNTER — EMERGENCY (EMERGENCY)
Facility: HOSPITAL | Age: 56
LOS: 1 days | Discharge: HOME | End: 2021-03-04
Attending: HOSPITALIST | Admitting: HOSPITALIST
Payer: MEDICAID

## 2021-03-04 VITALS
TEMPERATURE: 98 F | RESPIRATION RATE: 18 BRPM | HEART RATE: 106 BPM | OXYGEN SATURATION: 100 % | DIASTOLIC BLOOD PRESSURE: 100 MMHG | SYSTOLIC BLOOD PRESSURE: 179 MMHG | HEIGHT: 67 IN

## 2021-03-04 LAB
ALBUMIN SERPL ELPH-MCNC: 4.4 G/DL — SIGNIFICANT CHANGE UP (ref 3.3–5)
ALP SERPL-CCNC: 122 U/L — HIGH (ref 40–120)
ALT FLD-CCNC: 10 U/L — SIGNIFICANT CHANGE UP (ref 4–41)
ANION GAP SERPL CALC-SCNC: 12 MMOL/L — SIGNIFICANT CHANGE UP (ref 7–14)
APPEARANCE UR: CLEAR — SIGNIFICANT CHANGE UP
APTT BLD: 39.7 SEC — HIGH (ref 27–36.3)
AST SERPL-CCNC: 17 U/L — SIGNIFICANT CHANGE UP (ref 4–40)
BACTERIA # UR AUTO: NEGATIVE — SIGNIFICANT CHANGE UP
BASOPHILS # BLD AUTO: 0.03 K/UL — SIGNIFICANT CHANGE UP (ref 0–0.2)
BASOPHILS NFR BLD AUTO: 0.3 % — SIGNIFICANT CHANGE UP (ref 0–2)
BILIRUB SERPL-MCNC: 0.4 MG/DL — SIGNIFICANT CHANGE UP (ref 0.2–1.2)
BILIRUB UR-MCNC: NEGATIVE — SIGNIFICANT CHANGE UP
BUN SERPL-MCNC: 14 MG/DL — SIGNIFICANT CHANGE UP (ref 7–23)
CALCIUM SERPL-MCNC: 9.5 MG/DL — SIGNIFICANT CHANGE UP (ref 8.4–10.5)
CHLORIDE SERPL-SCNC: 101 MMOL/L — SIGNIFICANT CHANGE UP (ref 98–107)
CO2 SERPL-SCNC: 24 MMOL/L — SIGNIFICANT CHANGE UP (ref 22–31)
COLOR SPEC: YELLOW — SIGNIFICANT CHANGE UP
CREAT SERPL-MCNC: 1.38 MG/DL — HIGH (ref 0.5–1.3)
DIFF PNL FLD: NEGATIVE — SIGNIFICANT CHANGE UP
EOSINOPHIL # BLD AUTO: 0.12 K/UL — SIGNIFICANT CHANGE UP (ref 0–0.5)
EOSINOPHIL NFR BLD AUTO: 1.4 % — SIGNIFICANT CHANGE UP (ref 0–6)
EPI CELLS # UR: 1 /HPF — SIGNIFICANT CHANGE UP (ref 0–5)
GLUCOSE SERPL-MCNC: 89 MG/DL — SIGNIFICANT CHANGE UP (ref 70–99)
GLUCOSE UR QL: NEGATIVE — SIGNIFICANT CHANGE UP
HCT VFR BLD CALC: 42.4 % — SIGNIFICANT CHANGE UP (ref 39–50)
HGB BLD-MCNC: 13.8 G/DL — SIGNIFICANT CHANGE UP (ref 13–17)
HYALINE CASTS # UR AUTO: 1 /LPF — SIGNIFICANT CHANGE UP (ref 0–7)
IANC: 5.62 K/UL — SIGNIFICANT CHANGE UP (ref 1.5–8.5)
IMM GRANULOCYTES NFR BLD AUTO: 0.2 % — SIGNIFICANT CHANGE UP (ref 0–1.5)
INR BLD: 1 RATIO — SIGNIFICANT CHANGE UP (ref 0.88–1.16)
KETONES UR-MCNC: NEGATIVE — SIGNIFICANT CHANGE UP
LACTATE SERPL-SCNC: 0.8 MMOL/L — SIGNIFICANT CHANGE UP (ref 0.5–2)
LEUKOCYTE ESTERASE UR-ACNC: NEGATIVE — SIGNIFICANT CHANGE UP
LIDOCAIN IGE QN: 25 U/L — SIGNIFICANT CHANGE UP (ref 7–60)
LYMPHOCYTES # BLD AUTO: 2.2 K/UL — SIGNIFICANT CHANGE UP (ref 1–3.3)
LYMPHOCYTES # BLD AUTO: 25.3 % — SIGNIFICANT CHANGE UP (ref 13–44)
MCHC RBC-ENTMCNC: 29.2 PG — SIGNIFICANT CHANGE UP (ref 27–34)
MCHC RBC-ENTMCNC: 32.5 GM/DL — SIGNIFICANT CHANGE UP (ref 32–36)
MCV RBC AUTO: 89.8 FL — SIGNIFICANT CHANGE UP (ref 80–100)
MONOCYTES # BLD AUTO: 0.71 K/UL — SIGNIFICANT CHANGE UP (ref 0–0.9)
MONOCYTES NFR BLD AUTO: 8.2 % — SIGNIFICANT CHANGE UP (ref 2–14)
NEUTROPHILS # BLD AUTO: 5.62 K/UL — SIGNIFICANT CHANGE UP (ref 1.8–7.4)
NEUTROPHILS NFR BLD AUTO: 64.6 % — SIGNIFICANT CHANGE UP (ref 43–77)
NITRITE UR-MCNC: NEGATIVE — SIGNIFICANT CHANGE UP
NRBC # BLD: 0 /100 WBCS — SIGNIFICANT CHANGE UP
NRBC # FLD: 0 K/UL — SIGNIFICANT CHANGE UP
PH UR: 7.5 — SIGNIFICANT CHANGE UP (ref 5–8)
PLATELET # BLD AUTO: 245 K/UL — SIGNIFICANT CHANGE UP (ref 150–400)
POTASSIUM SERPL-MCNC: 4.1 MMOL/L — SIGNIFICANT CHANGE UP (ref 3.5–5.3)
POTASSIUM SERPL-SCNC: 4.1 MMOL/L — SIGNIFICANT CHANGE UP (ref 3.5–5.3)
PROT SERPL-MCNC: 7.3 G/DL — SIGNIFICANT CHANGE UP (ref 6–8.3)
PROT UR-MCNC: ABNORMAL
PROTHROM AB SERPL-ACNC: 11.4 SEC — SIGNIFICANT CHANGE UP (ref 10.6–13.6)
RBC # BLD: 4.72 M/UL — SIGNIFICANT CHANGE UP (ref 4.2–5.8)
RBC # FLD: 14.8 % — HIGH (ref 10.3–14.5)
RBC CASTS # UR COMP ASSIST: 1 /HPF — SIGNIFICANT CHANGE UP (ref 0–4)
SODIUM SERPL-SCNC: 137 MMOL/L — SIGNIFICANT CHANGE UP (ref 135–145)
SP GR SPEC: 1.02 — SIGNIFICANT CHANGE UP (ref 1.01–1.02)
UROBILINOGEN FLD QL: SIGNIFICANT CHANGE UP
WBC # BLD: 8.7 K/UL — SIGNIFICANT CHANGE UP (ref 3.8–10.5)
WBC # FLD AUTO: 8.7 K/UL — SIGNIFICANT CHANGE UP (ref 3.8–10.5)
WBC UR QL: 5 /HPF — SIGNIFICANT CHANGE UP (ref 0–5)

## 2021-03-04 PROCEDURE — 99285 EMERGENCY DEPT VISIT HI MDM: CPT

## 2021-03-04 PROCEDURE — 74177 CT ABD & PELVIS W/CONTRAST: CPT | Mod: 26

## 2021-03-04 RX ORDER — SODIUM CHLORIDE 9 MG/ML
1000 INJECTION INTRAMUSCULAR; INTRAVENOUS; SUBCUTANEOUS ONCE
Refills: 0 | Status: COMPLETED | OUTPATIENT
Start: 2021-03-04 | End: 2021-03-04

## 2021-03-04 RX ORDER — MORPHINE SULFATE 50 MG/1
4 CAPSULE, EXTENDED RELEASE ORAL ONCE
Refills: 0 | Status: DISCONTINUED | OUTPATIENT
Start: 2021-03-04 | End: 2021-03-04

## 2021-03-04 RX ORDER — ONDANSETRON 8 MG/1
4 TABLET, FILM COATED ORAL ONCE
Refills: 0 | Status: COMPLETED | OUTPATIENT
Start: 2021-03-04 | End: 2021-03-04

## 2021-03-04 RX ORDER — HEPARIN SODIUM 5000 [USP'U]/ML
2500 INJECTION INTRAVENOUS; SUBCUTANEOUS EVERY 6 HOURS
Refills: 0 | Status: DISCONTINUED | OUTPATIENT
Start: 2021-03-04 | End: 2021-03-05

## 2021-03-04 RX ORDER — HEPARIN SODIUM 5000 [USP'U]/ML
5000 INJECTION INTRAVENOUS; SUBCUTANEOUS ONCE
Refills: 0 | Status: COMPLETED | OUTPATIENT
Start: 2021-03-04 | End: 2021-03-05

## 2021-03-04 RX ORDER — HEPARIN SODIUM 5000 [USP'U]/ML
5000 INJECTION INTRAVENOUS; SUBCUTANEOUS EVERY 6 HOURS
Refills: 0 | Status: DISCONTINUED | OUTPATIENT
Start: 2021-03-04 | End: 2021-03-05

## 2021-03-04 RX ORDER — HEPARIN SODIUM 5000 [USP'U]/ML
INJECTION INTRAVENOUS; SUBCUTANEOUS
Qty: 25000 | Refills: 0 | Status: DISCONTINUED | OUTPATIENT
Start: 2021-03-04 | End: 2021-03-05

## 2021-03-04 RX ADMIN — MORPHINE SULFATE 4 MILLIGRAM(S): 50 CAPSULE, EXTENDED RELEASE ORAL at 19:44

## 2021-03-04 RX ADMIN — ONDANSETRON 4 MILLIGRAM(S): 8 TABLET, FILM COATED ORAL at 19:44

## 2021-03-04 RX ADMIN — HEPARIN SODIUM 1100 UNIT(S)/HR: 5000 INJECTION INTRAVENOUS; SUBCUTANEOUS at 23:58

## 2021-03-04 RX ADMIN — SODIUM CHLORIDE 1000 MILLILITER(S): 9 INJECTION INTRAMUSCULAR; INTRAVENOUS; SUBCUTANEOUS at 19:44

## 2021-03-04 NOTE — ED PROVIDER NOTE - CARE PROVIDER_API CALL
Patti Jacobsen (MD; MS)  Vascular Surgery  [address], [address]  [place] [address]  Phone: [phone]  Fax: [phone]  Follow Up Time: Urgent

## 2021-03-04 NOTE — ED PROVIDER NOTE - OBJECTIVE STATEMENT
55M with hx of colonic mass, htn, cad p/w upper abdominal pain for the past 2 days. no vomiting, diarrhea or fevers. not tolerating po intake.

## 2021-03-04 NOTE — ED PROVIDER NOTE - PMH
CAD S/P percutaneous coronary angioplasty  10/26/2020  Colonic mass    HTN (hypertension)    Ingrown toenail    Smoker

## 2021-03-04 NOTE — ED PROVIDER NOTE - PATIENT PORTAL LINK FT
You can access the FollowMyHealth Patient Portal offered by [facility] by registering at the following website: [email or web address]. By joining [employer]’s FollowMyHealth portal, you will also be able to view your health information using other applications (apps) compatible with our system.

## 2021-03-04 NOTE — ED PROVIDER NOTE - PROGRESS NOTE DETAILS
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 spoke with patient and he denies any pain but does report he has not been having an erection lately. Vascular paged and will come to see patient. will start Herparin drip Jonas: received pt on sign out pending vascular surgery consult. 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 Vascular evaluated and recommending outpatient follow up and starting daily asa 81 mg.   Patient asymptomatic from occlusion in artery, patent common femoral and deep vessels  - ASA 81 daily  - Follow up with Dr Jacobsen in the office, ([phone])  Pt verbalizes agreement and understanding. If any worsening symptoms return to ED.

## 2021-03-05 VITALS
RESPIRATION RATE: 18 BRPM | DIASTOLIC BLOOD PRESSURE: 116 MMHG | SYSTOLIC BLOOD PRESSURE: 171 MMHG | OXYGEN SATURATION: 100 % | HEART RATE: 82 BPM | TEMPERATURE: 98 F

## 2021-03-05 LAB
CULTURE RESULTS: NO GROWTH — SIGNIFICANT CHANGE UP
SARS-COV-2 RNA SPEC QL NAA+PROBE: SIGNIFICANT CHANGE UP
SPECIMEN SOURCE: SIGNIFICANT CHANGE UP

## 2021-03-05 RX ORDER — ASPIRIN/CALCIUM CARB/MAGNESIUM 324 MG
1 TABLET ORAL
Qty: 30 | Refills: 1
Start: 2021-03-05 | End: 2021-05-03

## 2021-03-05 RX ORDER — KETOROLAC TROMETHAMINE 30 MG/ML
30 SYRINGE (ML) INJECTION ONCE
Refills: 0 | Status: DISCONTINUED | OUTPATIENT
Start: 2021-03-05 | End: 2021-03-05

## 2021-03-05 RX ADMIN — Medication 30 MILLIGRAM(S): at 02:23

## 2021-03-05 RX ADMIN — HEPARIN SODIUM UNIT(S)/HR: 5000 INJECTION INTRAVENOUS; SUBCUTANEOUS at 01:57

## 2021-03-05 RX ADMIN — HEPARIN SODIUM 5000 UNIT(S): 5000 INJECTION INTRAVENOUS; SUBCUTANEOUS at 00:00

## 2021-03-05 NOTE — CONSULT NOTE ADULT - SUBJECTIVE AND OBJECTIVE BOX
SURGERY CONSULT NOTE     HPI: 55 year old male with history of HTN, LAR in 2020 for tubular adenoma presenting with abdominal pain. On CT scan of the abdomen found to have acute occlusion of the superficial femoral artery. He reports he has had no leg pain, no problems walking, no swelling. He has never had vascular surgery before.     PMHx: Smoker    CAD S/P percutaneous coronary angioplasty    CAD (coronary artery disease)    Ingrown toenail    Colonic mass    No significant past medical history    HTN (hypertension)      PSHx: No significant past surgical history    No significant past surgical history    Ingrowing toenail    Ingrowing left great toenail      Medications (inpatient):   Medications (PRN):  Allergies: No Known Allergies  (Intolerances: )  Social Hx:   Family Hx:     Physical Exam  T(C): 36.4  HR: 82 (82 - 106)  BP: 171/116 (170/119 - 179/100)  RR: 18 (18 - 18)  SpO2: 100% (99% - 100%)  Tmax: T(C): , Max: 36.8 (-21 @ 16:45)    General: well developed, well nourished, NAD  Neuro: alert and oriented, no focal deficits, moves all extremities spontaneously  Respiratory: airway patent, respirations unlabored  CVS: regular rate and rhythm  Abdomen: soft, mildly tender, nondistended  Extremities: no edema, palpable femoral, DP and PT bilaterally,   Skin: warm, dry, appropriate color    Labs:                        13.8   8.70  )-----------( 245      ( 04 Mar 2021 19:48 )             42.4     PT/INR - ( 04 Mar 2021 23:20 )   PT: 11.4 sec;   INR: 1.00 ratio         PTT - ( 04 Mar 2021 23:20 )  PTT:39.7 sec      137  |  101  |  14  ----------------------------<  89  4.1   |  24  |  1.38<H>    Ca    9.5      04 Mar 2021 19:48    TPro  7.3  /  Alb  4.4  /  TBili  0.4  /  DBili  x   /  AST  17  /  ALT  10  /  AlkPhos  122<H>  03-04    Urinalysis Basic - ( 04 Mar 2021 19:50 )    Color: Yellow / Appearance: Clear / S.017 / pH: x  Gluc: x / Ketone: Negative  / Bili: Negative / Urobili: <2 mg/dL   Blood: x / Protein: 30 mg/dL / Nitrite: Negative   Leuk Esterase: Negative / RBC: 1 /HPF / WBC 5 /HPF   Sq Epi: x / Non Sq Epi: 1 /HPF / Bacteria: Negative            Imaging and other studies:    < from: CT Abdomen and Pelvis w/ IV Cont (21 @ 21:54) >  FINDINGS:  LOWER CHEST: Within normal limits.    LIVER: Within normal limits.  BILE DUCTS: Normal caliber.  GALLBLADDER: Within normal limits.  SPLEEN: Within normal limits.  PANCREAS: Within normal limits.  ADRENALS: Within normal limits.  KIDNEYS/URETERS: Within normal limits.    BLADDER: Within normal limits.  REPRODUCTIVE ORGANS:   Prostate is enlarged.    BOWEL: No bowel obstruction. Appendix is not clearly visualized but there are no secondary signs of acute appendicitis. Limited evaluation for colonic mass as portions of the bowelare underdistended.  PERITONEUM: No ascites.  VESSELS: There is no opacification of the left femoral artery (series 2 image [id number]) representing complete thrombosis.  RETROPERITONEUM/LYMPH NODES: No lymphadenopathy.  ABDOMINAL WALL: Within normal limits.  BONES: Within normal limits.    IMPRESSION:  No bowel obstruction. The appendix is not visualized but there are no secondary signs of acute appendicitis.  No opacification of the left femoral artery (series 2 image [id number]) representing complete thrombosis. These findings were discussed with Dr. ITALO PAZ  at 3/4/2021 10:30 PM by Dr. Beckford with read back confirmation.    < end of copied text >

## 2021-03-05 NOTE — CONSULT NOTE ADULT - ASSESSMENT
55 year old male with PMH of HTN and LAR in Nov 2020 presenting with abdominal pain and incidentally found to have occlusion of left SFA    Plan:  - Patient asymptomatic from occlusion in artery, patent common femoral and deep vessels  - Recommend ASA 81 daily  - Follow up with Dr Jacobsen in the office, ([phone])    Discussed with vascular fellow on behalf of attending    Cha Robledo, PGY2

## 2021-03-12 ENCOUNTER — APPOINTMENT (OUTPATIENT)
Dept: UROLOGY | Facility: CLINIC | Age: 56
End: 2021-03-12
Payer: MEDICAID

## 2021-03-12 PROCEDURE — 99204 OFFICE O/P NEW MOD 45 MIN: CPT

## 2021-03-12 PROCEDURE — 99072 ADDL SUPL MATRL&STAF TM PHE: CPT

## 2021-03-13 NOTE — ASSESSMENT
[FreeTextEntry1] : Very pleasant 55-year-old gentleman who presents for evaluation of erectile dysfunction, screening for prostate cancer\par -Discussed the rationale for screening for prostate cancer with PSA and digital rectal exam. We discussed risk factors for the development of prostate cancer.  We also discussed the natural history of prostate cancer.\par -PSA today\par - Start cialis 20 mg today\par -I discussed the risks, benefits, alternatives, and possible side effects of Cialis (tadalafil) therapy with the patient, including but not limited to headache, flushing, upset stomach, blurry vision, change in color vision, vision loss, and priapism with the patient.\par - F/u in one month

## 2021-03-13 NOTE — HISTORY OF PRESENT ILLNESS
[FreeTextEntry1] : Very pleasant 55 yom with PMH significant for sigmoidectomy (November, Dr. Bermudez, benign pathology) who presents for initial evaluation of erectile dysfunction.  He originally started noticing symptoms approximately one year ago.  His symptoms acutely worsened approximately three weeks ago.  He states he no longer gets morning erections.  He rates his rigidity as 6/10, which is sufficient for penetration.  The dysfunction is the same for both masturbation and intercourse with his wife.  Has tried viagra 100 without success.  Denies decreased energy or libido.  No FH of  cancer.  Interested in PSA testing.\par

## 2021-03-13 NOTE — REVIEW OF SYSTEMS
[Poor quality erections] : Poor quality erections [No erections] : no erections [Negative] : Heme/Lymph [FreeTextEntry4] : Erectile dysfunction

## 2021-03-15 LAB
PSA FREE FLD-MCNC: 39 %
PSA FREE SERPL-MCNC: 0.32 NG/ML
PSA SERPL-MCNC: 0.81 NG/ML

## 2021-04-13 ENCOUNTER — APPOINTMENT (OUTPATIENT)
Dept: UROLOGY | Facility: CLINIC | Age: 56
End: 2021-04-13
Payer: MEDICAID

## 2021-04-13 PROCEDURE — 99072 ADDL SUPL MATRL&STAF TM PHE: CPT

## 2021-04-13 PROCEDURE — 99213 OFFICE O/P EST LOW 20 MIN: CPT

## 2021-04-13 NOTE — HISTORY OF PRESENT ILLNESS
[FreeTextEntry1] : Very pleasant 55-year-old gentleman presents for follow-up of erectile dysfunction and screening for prostate cancer.  At last visit his PSA was noted to be 0.81.  He reports that he has used Cialis 20 mg with significant improvement in his erections over the last month.  He reports 10 out of 10 rigidity and tumescence.  He recently used the medication again and reports that it did not have the same efficacy as in the past.  He reports starting famotidine and cilostazol prior to this.  He is undergoing an angioplasty for his right leg tomorrow.

## 2021-04-13 NOTE — ASSESSMENT
[FreeTextEntry1] : Very pleasant 55-year-old gentleman who presents for follow-up of erectile dysfunction, screening for prostate cancer\par -PSA reviewed–0.81\par –We discussed that his risk for prostate cancer is low, given his negative JEN and low PSA\par -Refill for Cialis sent to the pharmacy\par -Follow-up in 3 months

## 2021-05-07 VITALS
TEMPERATURE: 97 F | DIASTOLIC BLOOD PRESSURE: 81 MMHG | SYSTOLIC BLOOD PRESSURE: 120 MMHG | WEIGHT: 130.07 LBS | HEIGHT: 67 IN | HEART RATE: 95 BPM | RESPIRATION RATE: 16 BRPM | OXYGEN SATURATION: 98 %

## 2021-05-07 RX ORDER — CHLORHEXIDINE GLUCONATE 213 G/1000ML
1 SOLUTION TOPICAL ONCE
Refills: 0 | Status: DISCONTINUED | OUTPATIENT
Start: 2021-05-10 | End: 2021-05-11

## 2021-05-07 NOTE — H&P ADULT - GASTROINTESTINAL DETAILS
normal/soft/nontender/no distention/bowel sounds normal/no bruit/no rebound tenderness/no guarding/no rigidity

## 2021-05-07 NOTE — H&P ADULT - HISTORY OF PRESENT ILLNESS
COVID:  Pharmacy:  Escort:    56yo Male, current smoker, with PMHx of HTN, HFmEF (EF 40-45%), known occluded R+L SFA and Colonic adenomatous lesion s/p Sigmoidectomy 11/23/20, who initially presented to the cardiologist Dr. Loza c/o KEON with 2-3 flights of stairs for the past one month. He denies any ___ CP, palpitations, dizziness, syncope, diaphoresis, fatigue, LE edema, orthopnea, PND, N/V, fever, chills or recent sick contact. ECG in office revealed NSR with LVH and Echo (9/2020) revealed EF 40-45%, mod concentric LVH, Grade I Diastolic dysfunction, resting regional WMA of inferoseptal wall, mild MR/TR. He subsequently had a NST revealed EF 34% with mild-mod inferoposterior scarring, no evidence of reversible ischemia. Per MD note, pt had a Cardiac Cath revealed RCA disease. CTA Aorta w/ LE Runoff (4/22/21) revealing occluded R distal SFA, occluded L SFA, ectatic R common illiac artery and L internal iliac artery, nonvisualization of distal R anterior Tibial (probably occluded).    In light of pts risk factors, CCS class __ anginal symptoms, abnormal NST and prior cath, pt now presents to [facility] for staged PCI of RCA. COVID: ___ (5/7/21 at [facility])  Pharmacy: [facility]   Escort: Cousin    54yo Male, current smoker, with PMHx of HTN, HFmEF (EF 40-45%), known CAD s/p dx cath 10/2020 (positive FFR mRCA, no intervention 2/2 upcoming surgery), known occluded R+L SFA and Colonic adenomatous lesion s/p Sigmoidectomy 11/23/20, who initially presented to the cardiologist Dr. Loza c/o KEON with 2-3 flights of stairs for the past one month. He denies any CP, palpitations, dizziness, syncope, diaphoresis, fatigue, LE edema, orthopnea, PND, N/V, fever, chills or recent sick contact. ECG in office revealed NSR with LVH and Echo (9/2020) revealed EF 40-45%, mod concentric LVH, Grade I Diastolic dysfunction, resting regional WMA of inferoseptal wall, mild MR/TR. He subsequently had a NST revealed EF 34% with mild-mod inferoposterior scarring, no evidence of reversible ischemia. CTA Aorta w/ LE Runoff (4/22/21) revealing occluded R distal SFA, occluded L SFA, ectatic R common illiac artery and L internal iliac artery, nonvisualization of distal R anterior Tibial (probably occluded). Pts most recent Cardiac Cath 10/26/20: positive FFR 0.7 of mRCA, LM normal, LAD mild luminal, Ramus small vessel mild diffuse, mLCx 30%, dLCx 40%, OM1 normal.    In light of pts risk factors, CCS class III anginal symptoms, abnormal NST and prior cath, pt now presents to [facility] for staged PCI of RCA.

## 2021-05-10 ENCOUNTER — TRANSCRIPTION ENCOUNTER (OUTPATIENT)
Age: 56
End: 2021-05-10

## 2021-05-10 ENCOUNTER — INPATIENT (INPATIENT)
Facility: HOSPITAL | Age: 56
LOS: 0 days | Discharge: HOME | DRG: 247 | End: 2021-05-11
Attending: INTERNAL MEDICINE | Admitting: INTERNAL MEDICINE
Payer: MEDICAID

## 2021-05-10 DIAGNOSIS — Z90.49 ACQUIRED ABSENCE OF OTHER SPECIFIED PARTS OF DIGESTIVE TRACT: Chronic | ICD-10-CM

## 2021-05-10 LAB
A1C WITH ESTIMATED AVERAGE GLUCOSE RESULT: 5.4 % — SIGNIFICANT CHANGE UP (ref 4–5.6)
ALBUMIN SERPL ELPH-MCNC: 4.3 G/DL — SIGNIFICANT CHANGE UP (ref 3.3–5)
ALP SERPL-CCNC: 113 U/L — SIGNIFICANT CHANGE UP (ref 40–120)
ALT FLD-CCNC: 18 U/L — SIGNIFICANT CHANGE UP (ref 10–45)
ANION GAP SERPL CALC-SCNC: 13 MMOL/L — SIGNIFICANT CHANGE UP (ref 5–17)
APTT BLD: 46.8 SEC — HIGH (ref 27.5–35.5)
AST SERPL-CCNC: 24 U/L — SIGNIFICANT CHANGE UP (ref 10–40)
BASOPHILS # BLD AUTO: 0.05 K/UL — SIGNIFICANT CHANGE UP (ref 0–0.2)
BASOPHILS NFR BLD AUTO: 0.6 % — SIGNIFICANT CHANGE UP (ref 0–2)
BILIRUB SERPL-MCNC: 0.5 MG/DL — SIGNIFICANT CHANGE UP (ref 0.2–1.2)
BUN SERPL-MCNC: 17 MG/DL — SIGNIFICANT CHANGE UP (ref 7–23)
CALCIUM SERPL-MCNC: 9.7 MG/DL — SIGNIFICANT CHANGE UP (ref 8.4–10.5)
CHLORIDE SERPL-SCNC: 99 MMOL/L — SIGNIFICANT CHANGE UP (ref 96–108)
CHOLEST SERPL-MCNC: 200 MG/DL — HIGH
CK MB CFR SERPL CALC: 2.1 NG/ML — SIGNIFICANT CHANGE UP (ref 0–6.7)
CK SERPL-CCNC: 126 U/L — SIGNIFICANT CHANGE UP (ref 30–200)
CO2 SERPL-SCNC: 23 MMOL/L — SIGNIFICANT CHANGE UP (ref 22–31)
CREAT SERPL-MCNC: 1.41 MG/DL — HIGH (ref 0.5–1.3)
EOSINOPHIL # BLD AUTO: 0.19 K/UL — SIGNIFICANT CHANGE UP (ref 0–0.5)
EOSINOPHIL NFR BLD AUTO: 2.3 % — SIGNIFICANT CHANGE UP (ref 0–6)
ESTIMATED AVERAGE GLUCOSE: 108 MG/DL — SIGNIFICANT CHANGE UP (ref 68–114)
GLUCOSE SERPL-MCNC: 116 MG/DL — HIGH (ref 70–99)
HCT VFR BLD CALC: 37.2 % — LOW (ref 39–50)
HDLC SERPL-MCNC: 69 MG/DL — SIGNIFICANT CHANGE UP
HGB BLD-MCNC: 12.8 G/DL — LOW (ref 13–17)
IMM GRANULOCYTES NFR BLD AUTO: 0.4 % — SIGNIFICANT CHANGE UP (ref 0–1.5)
INR BLD: 0.95 — SIGNIFICANT CHANGE UP (ref 0.88–1.16)
LIPID PNL WITH DIRECT LDL SERPL: 66 MG/DL — SIGNIFICANT CHANGE UP
LYMPHOCYTES # BLD AUTO: 2.19 K/UL — SIGNIFICANT CHANGE UP (ref 1–3.3)
LYMPHOCYTES # BLD AUTO: 26.2 % — SIGNIFICANT CHANGE UP (ref 13–44)
MCHC RBC-ENTMCNC: 30.7 PG — SIGNIFICANT CHANGE UP (ref 27–34)
MCHC RBC-ENTMCNC: 34.4 GM/DL — SIGNIFICANT CHANGE UP (ref 32–36)
MCV RBC AUTO: 89.2 FL — SIGNIFICANT CHANGE UP (ref 80–100)
MONOCYTES # BLD AUTO: 0.89 K/UL — SIGNIFICANT CHANGE UP (ref 0–0.9)
MONOCYTES NFR BLD AUTO: 10.7 % — SIGNIFICANT CHANGE UP (ref 2–14)
NEUTROPHILS # BLD AUTO: 5 K/UL — SIGNIFICANT CHANGE UP (ref 1.8–7.4)
NEUTROPHILS NFR BLD AUTO: 59.8 % — SIGNIFICANT CHANGE UP (ref 43–77)
NON HDL CHOLESTEROL: 131 MG/DL — HIGH
NRBC # BLD: 0 /100 WBCS — SIGNIFICANT CHANGE UP (ref 0–0)
PLATELET # BLD AUTO: 320 K/UL — SIGNIFICANT CHANGE UP (ref 150–400)
POTASSIUM SERPL-MCNC: 4.1 MMOL/L — SIGNIFICANT CHANGE UP (ref 3.5–5.3)
POTASSIUM SERPL-SCNC: 4.1 MMOL/L — SIGNIFICANT CHANGE UP (ref 3.5–5.3)
PROT SERPL-MCNC: 7 G/DL — SIGNIFICANT CHANGE UP (ref 6–8.3)
PROTHROM AB SERPL-ACNC: 11.4 SEC — SIGNIFICANT CHANGE UP (ref 10.6–13.6)
RBC # BLD: 4.17 M/UL — LOW (ref 4.2–5.8)
RBC # FLD: 13.8 % — SIGNIFICANT CHANGE UP (ref 10.3–14.5)
SODIUM SERPL-SCNC: 135 MMOL/L — SIGNIFICANT CHANGE UP (ref 135–145)
TRIGL SERPL-MCNC: 324 MG/DL — HIGH
WBC # BLD: 8.35 K/UL — SIGNIFICANT CHANGE UP (ref 3.8–10.5)
WBC # FLD AUTO: 8.35 K/UL — SIGNIFICANT CHANGE UP (ref 3.8–10.5)

## 2021-05-10 PROCEDURE — 93010 ELECTROCARDIOGRAM REPORT: CPT | Mod: 77

## 2021-05-10 PROCEDURE — 93010 ELECTROCARDIOGRAM REPORT: CPT

## 2021-05-10 RX ORDER — LOSARTAN POTASSIUM 100 MG/1
50 TABLET, FILM COATED ORAL DAILY
Refills: 0 | Status: DISCONTINUED | OUTPATIENT
Start: 2021-05-11 | End: 2021-05-11

## 2021-05-10 RX ORDER — CLOPIDOGREL BISULFATE 75 MG/1
600 TABLET, FILM COATED ORAL ONCE
Refills: 0 | Status: COMPLETED | OUTPATIENT
Start: 2021-05-10 | End: 2021-05-10

## 2021-05-10 RX ORDER — SODIUM CHLORIDE 9 MG/ML
500 INJECTION INTRAMUSCULAR; INTRAVENOUS; SUBCUTANEOUS
Refills: 0 | Status: DISCONTINUED | OUTPATIENT
Start: 2021-05-10 | End: 2021-05-11

## 2021-05-10 RX ORDER — PANTOPRAZOLE SODIUM 20 MG/1
40 TABLET, DELAYED RELEASE ORAL
Refills: 0 | Status: DISCONTINUED | OUTPATIENT
Start: 2021-05-10 | End: 2021-05-11

## 2021-05-10 RX ORDER — ATORVASTATIN CALCIUM 80 MG/1
40 TABLET, FILM COATED ORAL AT BEDTIME
Refills: 0 | Status: DISCONTINUED | OUTPATIENT
Start: 2021-05-10 | End: 2021-05-11

## 2021-05-10 RX ORDER — CLOPIDOGREL BISULFATE 75 MG/1
75 TABLET, FILM COATED ORAL DAILY
Refills: 0 | Status: DISCONTINUED | OUTPATIENT
Start: 2021-05-11 | End: 2021-05-11

## 2021-05-10 RX ORDER — ASPIRIN/CALCIUM CARB/MAGNESIUM 324 MG
81 TABLET ORAL DAILY
Refills: 0 | Status: DISCONTINUED | OUTPATIENT
Start: 2021-05-10 | End: 2021-05-11

## 2021-05-10 RX ORDER — CARVEDILOL PHOSPHATE 80 MG/1
12.5 CAPSULE, EXTENDED RELEASE ORAL
Refills: 0 | Status: DISCONTINUED | OUTPATIENT
Start: 2021-05-10 | End: 2021-05-11

## 2021-05-10 RX ORDER — SODIUM CHLORIDE 9 MG/ML
500 INJECTION INTRAMUSCULAR; INTRAVENOUS; SUBCUTANEOUS
Refills: 0 | Status: DISCONTINUED | OUTPATIENT
Start: 2021-05-10 | End: 2021-05-10

## 2021-05-10 RX ADMIN — SODIUM CHLORIDE 75 MILLILITER(S): 9 INJECTION INTRAMUSCULAR; INTRAVENOUS; SUBCUTANEOUS at 18:27

## 2021-05-10 RX ADMIN — SODIUM CHLORIDE 50 MILLILITER(S): 9 INJECTION INTRAMUSCULAR; INTRAVENOUS; SUBCUTANEOUS at 16:27

## 2021-05-10 RX ADMIN — CARVEDILOL PHOSPHATE 12.5 MILLIGRAM(S): 80 CAPSULE, EXTENDED RELEASE ORAL at 19:06

## 2021-05-10 RX ADMIN — CLOPIDOGREL BISULFATE 600 MILLIGRAM(S): 75 TABLET, FILM COATED ORAL at 16:38

## 2021-05-10 NOTE — DISCHARGE NOTE PROVIDER - NSDCFUADDINST_GEN_ALL_CORE_FT
•	Your procedure was done through your  wrist  •	You do not need to keep this area covered and you may shower  •	Please avoid any heavy lifting  (no more than 3 to 5 lbs) or strenuous activity for five days  •	If you develop any swelling, bleeding, hardening of the skin (hematoma formation), acute pain, numbness/tingling  in your arm  please contact your doctor immediately or call our 24/7 line: [phone]

## 2021-05-10 NOTE — DISCHARGE NOTE PROVIDER - NSDCMRMEDTOKEN_GEN_ALL_CORE_FT
aspirin 81 mg oral delayed release tablet: 1 tab(s) orally once a day   carvedilol 12.5 mg oral tablet: 1 tab(s) orally 2 times a day  chlorthalidone 25 mg oral tablet: 1 tab(s) orally once a day  cilostazol 100 mg oral tablet: 1 tab(s) orally 2 times a day  famotidine 40 mg oral tablet: 1 tab(s) orally once a day  losartan 50 mg oral tablet: 1 tab(s) orally once a day  tadalafil 20 mg oral tablet: 1 tab(s) orally once a day  Vitamin D2 50,000 intl units (1.25 mg) oral capsule: 1 cap(s) orally once a day   aspirin 81 mg oral delayed release tablet: 1 tab(s) orally once a day   cardiac rehab: cardiac rehab 3 X week for 12 weeks fro diagnosis of coronary artery disease s/p stent  carvedilol 12.5 mg oral tablet: 1 tab(s) orally 2 times a day  chlorthalidone 25 mg oral tablet: 1 tab(s) orally once a day  cilostazol 100 mg oral tablet: 1 tab(s) orally 2 times a day  clopidogrel 75 mg oral tablet: 1 tab(s) orally once a day  famotidine 40 mg oral tablet: 1 tab(s) orally once a day  losartan 50 mg oral tablet: 1 tab(s) orally once a day  tadalafil 20 mg oral tablet: 1 tab(s) orally once a day  Vitamin D2 50,000 intl units (1.25 mg) oral capsule: 1 cap(s) orally once a day   aspirin 81 mg oral delayed release tablet: 1 tab(s) orally once a day   cardiac rehab: cardiac rehab 3 X week for 12 weeks fro diagnosis of coronary artery disease s/p stent  carvedilol 12.5 mg oral tablet: 1 tab(s) orally 2 times a day  chlorthalidone 25 mg oral tablet: 1 tab(s) orally once a day  clopidogrel 75 mg oral tablet: 1 tab(s) orally once a day  famotidine 40 mg oral tablet: 1 tab(s) orally once a day  losartan 50 mg oral tablet: 1 tab(s) orally once a day  Vitamin D2 50,000 intl units (1.25 mg) oral capsule: 1 cap(s) orally once a day

## 2021-05-10 NOTE — DISCHARGE NOTE PROVIDER - CARE PROVIDER_API CALL
Milo Loza  CARDIOVASCULAR DISEASE  [address] [place] [address]  Phone: [phone]  Fax: [phone]  Follow Up Time:

## 2021-05-10 NOTE — DISCHARGE NOTE PROVIDER - NSDCFUADDAPPT_GEN_ALL_CORE_FT
Please follow up with Dr. Loza in 1-2 week. Please call his office and make an appointment to see him.

## 2021-05-10 NOTE — DISCHARGE NOTE PROVIDER - HOSPITAL COURSE
56yo Male, current smoker, with PMHx of HTN, HFmEF (EF 40-45%), known CAD s/p dx cath 10/2020 (positive FFR mRCA, no intervention 2/2 upcoming surgery), known occluded R+L SFA and Colonic adenomatous lesion s/p Sigmoidectomy 11/23/20, who initially presented to the cardiologist Dr. Loza c/o HERBERT with 2-3 flights of stairs for the past one month. He denies any CP, palpitations, dizziness, syncope, diaphoresis, fatigue, LE edema, orthopnea, PND, N/V, fever, chills or recent sick contact. ECG in office revealed NSR with LVH and Echo (9/2020) revealed EF 40-45%, mod concentric LVH, Grade I Diastolic dysfunction, resting regional WMA of inferoseptal wall, mild MR/TR. He subsequently had a NST revealed EF 34% with mild-mod inferoposterior scarring, no evidence of reversible ischemia. CTA Aorta w/ LE Runoff (4/22/21) revealing occluded R distal SFA, occluded L SFA, ectatic R common illiac artery and L internal iliac artery, nonvisualization of distal R anterior Tibial (probably occluded). Pts most recent Cardiac Cath 10/26/20: positive FFR 0.7 of mRCA, LM normal, LAD mild luminal, Ramus small vessel mild diffuse, mLCx 30%, dLCx 40%, OM1 normal. In light of pts risk factors, CCS class III anginal symptoms, abnormal NST and prior cath, pt now presents to [facility] for staged PCI of RCA. Pt. s/p cardiac cath 5/10/21: HARJINDER mRCA, R Rad access; EF 40-45 % by ECHO.    Pt admitted o/n for monitoring. Pt. seen and examined at bedside today am. Pt. comfortable, denies any CP, SOB, dizziness, palpitations, R radial access site stable, no bleeding and hematoma noted, R radial pulse intact.  VSS. Labs stable o/n. home meds reviwed with Dr. Plaza and pt. to be d/c on ASA 81 mg daily, plavix 75 mg daily, coreg 12,5 mg BID, Losartan 50 mg daily and ....  home cilostazol d/c as per d/w Dr. Loza.  Pt. stable to be d/c as per Dr. Plaza and to f/u with Dr. Loza in 1-2 week Patient has been given appropriate discharge instructions including medication regimen, access site management and follow up. Prescriptions have been e-prescribed to patient's preferred pharmacy  Prescription for cardiac rehab provided to patient.        56yo Male, current smoker, with PMHx of HTN, HFmEF (EF 40-45%), known CAD s/p dx cath 10/2020 (positive FFR mRCA, no intervention 2/2 upcoming surgery), known occluded R+L SFA and Colonic adenomatous lesion s/p Sigmoidectomy 11/23/20, who initially presented to the cardiologist Dr. Loza c/o HERBERT with 2-3 flights of stairs for the past one month. He denies any CP, palpitations, dizziness, syncope, diaphoresis, fatigue, LE edema, orthopnea, PND, N/V, fever, chills or recent sick contact. ECG in office revealed NSR with LVH and Echo (9/2020) revealed EF 40-45%, mod concentric LVH, Grade I Diastolic dysfunction, resting regional WMA of inferoseptal wall, mild MR/TR. He subsequently had a NST revealed EF 34% with mild-mod inferoposterior scarring, no evidence of reversible ischemia. CTA Aorta w/ LE Runoff (4/22/21) revealing occluded R distal SFA, occluded L SFA, ectatic R common illiac artery and L internal iliac artery, nonvisualization of distal R anterior Tibial (probably occluded). Pts most recent Cardiac Cath 10/26/20: positive FFR 0.7 of mRCA, LM normal, LAD mild luminal, Ramus small vessel mild diffuse, mLCx 30%, dLCx 40%, OM1 normal. In light of pts risk factors, CCS class III anginal symptoms, abnormal NST and prior cath, pt now presents to [facility] for staged PCI of RCA. Pt. s/p cardiac cath 5/10/21: HARJINDER mRCA, R Rad access; EF 40-45 % by ECHO.    Pt admitted o/n for monitoring. Pt. seen and examined at bedside today am. Pt. comfortable, denies any CP, SOB, dizziness, palpitations, R radial access site stable, no bleeding and hematoma noted, R radial pulse intact.  VSS. Labs stable o/n. home meds reviwed with Dr. Neville and pt. to be d/c on ASA 81 mg daily, plavix 75 mg daily, coreg 12,5 mg BID, Losartan 50 mg daily and chlorthalidone 25 mg daily.   home cilostazol and viagra d/c as per d/w Dr. Loza.  Pt. stable to be d/c as per Dr. Neville and to f/u with Dr. Loza in 1-2 week Patient has been given appropriate discharge instructions including medication regimen, access site management and follow up. Prescriptions have been e-prescribed to patient's preferred pharmacy  Prescription for cardiac rehab provided to patient.        54yo Male, current smoker, with PMHx of HTN, HFmEF (EF 40-45%), known CAD s/p dx cath 10/2020 (positive FFR mRCA, no intervention 2/2 upcoming surgery), known occluded R+L SFA and Colonic adenomatous lesion s/p Sigmoidectomy 11/23/20, who initially presented to the cardiologist Dr. Loza c/o HERBERT with 2-3 flights of stairs for the past one month. He denies any CP, palpitations, dizziness, syncope, diaphoresis, fatigue, LE edema, orthopnea, PND, N/V, fever, chills or recent sick contact. ECG in office revealed NSR with LVH and Echo (9/2020) revealed EF 40-45%, mod concentric LVH, Grade I Diastolic dysfunction, resting regional WMA of inferoseptal wall, mild MR/TR. He subsequently had a NST revealed EF 34% with mild-mod inferoposterior scarring, no evidence of reversible ischemia. CTA Aorta w/ LE Runoff (4/22/21) revealing occluded R distal SFA, occluded L SFA, ectatic R common illiac artery and L internal iliac artery, nonvisualization of distal R anterior Tibial (probably occluded). Pts most recent Cardiac Cath 10/26/20: positive FFR 0.7 of mRCA, LM normal, LAD mild luminal, Ramus small vessel mild diffuse, mLCx 30%, dLCx 40%, OM1 normal. In light of pts risk factors, CCS class III anginal symptoms, abnormal NST and prior cath, pt now presents to [facility] for staged PCI of RCA. Pt. s/p cardiac cath 5/10/21: HARJINDER mRCA, R Rad access; EF 40-45 % by ECHO.    Pt admitted o/n for monitoring. Pt. seen and examined at bedside today am. Pt. comfortable, denies any CP, SOB, dizziness, palpitations, R radial access site stable, no bleeding and hematoma noted, R radial pulse intact.  VSS. Labs stable o/n. home meds reviwed with Dr. Neville and pt. to be d/c on ASA 81 mg daily, plavix 75 mg daily, coreg 12,5 mg BID, Losartan 50 mg daily and chlorthalidone 25 mg daily.   home cilostazol and viagra d/c as per d/w Dr. Loza.  Pt. stable to be d/c as per Dr. Neville and to f/u with Dr. Loza in 1-2 week Patient has been given appropriate discharge instructions including medication regimen, access site management and follow up. Prescriptions have been e-prescribed to patient's preferred pharmacy  Prescription for cardiac rehab hasn't  been provided to patient 2/2 technical difficulties with the printer,        54yo Male, current smoker, with PMHx of HTN, HFmEF (EF 40-45%), known CAD s/p dx cath 10/2020 (positive FFR mRCA, no intervention 2/2 upcoming surgery), known occluded R+L SFA and Colonic adenomatous lesion s/p Sigmoidectomy 11/23/20, who initially presented to the cardiologist Dr. Loza c/o KEON with 2-3 flights of stairs for the past one month. He denies any CP, palpitations, dizziness, syncope, diaphoresis, fatigue, LE edema, orthopnea, PND, N/V, fever, chills or recent sick contact. ECG in office revealed NSR with LVH and Echo (9/2020) revealed EF 40-45%, mod concentric LVH, Grade I Diastolic dysfunction, resting regional WMA of inferoseptal wall, mild MR/TR. He subsequently had a NST revealed EF 34% with mild-mod inferoposterior scarring, no evidence of reversible ischemia. CTA Aorta w/ LE Runoff (4/22/21) revealing occluded R distal SFA, occluded L SFA, ectatic R common illiac artery and L internal iliac artery, nonvisualization of distal R anterior Tibial (probably occluded). Pts most recent Cardiac Cath 10/26/20: positive FFR 0.7 of mRCA, LM normal, LAD mild luminal, Ramus small vessel mild diffuse, mLCx 30%, dLCx 40%, OM1 normal. In light of pts risk factors, CCS class III anginal symptoms, abnormal NST and prior cath, pt now presents to [facility] for staged PCI of RCA. Pt. s/p cardiac cath 5/10/21: HARJINDER mRCA, R Rad access; EF 40-45 % by ECHO.    Pt admitted o/n for monitoring. Pt. seen and examined at bedside today am. Pt. comfortable, denies any CP, SOB, dizziness, palpitations, R radial access site stable, no bleeding and hematoma noted, R radial pulse intact.  VSS. Labs stable o/n. home meds reviwed with Dr. Neville and pt. to be d/c on ASA 81 mg daily, plavix 75 mg daily, coreg 12,5 mg BID, Lipitor 40 mg daily, Losartan 50 mg daily and chlorthalidone 25 mg daily.   home cilostazol and viagra d/c as per d/w Dr. Loza.  Pt. stable to be d/c as per Dr. Neville and to f/u with Dr. Loza in 1-2 week Patient has been given appropriate discharge instructions including medication regimen, access site management and follow up. Prescriptions have been e-prescribed to patient's preferred pharmacy  Prescription for cardiac rehab hasn't  been provided to patient 2/2 technical difficulties with the printer,        56yo Male, current smoker, with PMHx of HTN, HFmEF (EF 40-45%), known CAD s/p dx cath 10/2020 (positive FFR mRCA, no intervention 2/2 upcoming surgery), known occluded R+L SFA and Colonic adenomatous lesion s/p Sigmoidectomy 11/23/20, who initially presented to the cardiologist Dr. Loza c/o KEON with 2-3 flights of stairs for the past one month. He denies any CP, palpitations, dizziness, syncope, diaphoresis, fatigue, LE edema, orthopnea, PND, N/V, fever, chills or recent sick contact. ECG in office revealed NSR with LVH and Echo (9/2020) revealed EF 40-45%, mod concentric LVH, Grade I Diastolic dysfunction, resting regional WMA of inferoseptal wall, mild MR/TR. He subsequently had a NST revealed EF 34% with mild-mod inferoposterior scarring, no evidence of reversible ischemia. CTA Aorta w/ LE Runoff (4/22/21) revealing occluded R distal SFA, occluded L SFA, ectatic R common illiac artery and L internal iliac artery, nonvisualization of distal R anterior Tibial (probably occluded). Pts most recent Cardiac Cath 10/26/20: positive FFR 0.7 of mRCA, LM normal, LAD mild luminal, Ramus small vessel mild diffuse, mLCx 30%, dLCx 40%, OM1 normal. In light of pts risk factors, CCS class III anginal symptoms, abnormal NST and prior cath, pt now presents to [facility] for staged PCI of RCA. Pt. s/p cardiac cath 5/10/21: HARJINDER mRCA, R Rad access; EF 40-45 % by ECHO.    Pt admitted o/n for monitoring. Pt. seen and examined at bedside today am. Pt. comfortable, denies any CP, SOB, dizziness, palpitations, R radial access site stable, no bleeding and hematoma noted, R radial pulse intact.  VSS. Labs stable o/n. home meds reviwed with Dr. Neville and pt. to be d/c on ASA 81 mg daily, plavix 75 mg daily, coreg 12,5 mg BID, Lipitor 40 mg daily, Losartan 50 mg daily and chlorthalidone 25 mg daily.   home cilostazol and viagra d/c as per d/w Dr. Loza.  Pt. stable to be d/c as per Dr. Neville and to f/u with Dr. Loza in 1-2 week Patient has been given appropriate discharge instructions including medication regimen, access site management and follow up. Prescriptions have been e-prescribed to patient's preferred pharmacy  Prescription for cardiac rehab hasn't  been provided to patient 2/2 technical difficulties with the printer,     Of note: prescription for lipitor sent to patients pharmacy even though its not appearing on d/c summary meds. (confirmed with pharmacy that they have the prescription)   56yo Male, current smoker, with PMHx of HTN, HFmEF (EF 40-45%), known CAD s/p dx cath 10/2020 (positive FFR mRCA, no intervention 2/2 upcoming surgery), known occluded R+L SFA and Colonic adenomatous lesion s/p Sigmoidectomy 11/23/20, who initially presented to the cardiologist Dr. Loza c/o KEON with 2-3 flights of stairs for the past one month. He denies any CP, palpitations, dizziness, syncope, diaphoresis, fatigue, LE edema, orthopnea, PND, N/V, fever, chills or recent sick contact. ECG in office revealed NSR with LVH and Echo (9/2020) revealed EF 40-45%, mod concentric LVH, Grade I Diastolic dysfunction, resting regional WMA of inferoseptal wall, mild MR/TR. He subsequently had a NST revealed EF 34% with mild-mod inferoposterior scarring, no evidence of reversible ischemia. CTA Aorta w/ LE Runoff (4/22/21) revealing occluded R distal SFA, occluded L SFA, ectatic R common illiac artery and L internal iliac artery, nonvisualization of distal R anterior Tibial (probably occluded). Pts most recent Cardiac Cath 10/26/20: positive FFR 0.7 of mRCA, LM normal, LAD mild luminal, Ramus small vessel mild diffuse, mLCx 30%, dLCx 40%, OM1 normal. In light of pts risk factors, CCS class III anginal symptoms, abnormal NST and prior cath, pt now presents to [facility] for staged PCI of RCA. Pt. s/p cardiac cath 5/10/21: HARJINDER mRCA, R Rad access; EF 40-45 % by ECHO.    Pt admitted o/n for monitoring. Pt. seen and examined at bedside today am. Pt. comfortable, denies any CP, SOB, dizziness, palpitations, R radial access site stable, no bleeding and hematoma noted, R radial pulse intact.  VSS. Labs stable o/n. home meds reviwed with Dr. Neville and pt. to be d/c on ASA 81 mg daily, plavix 75 mg daily, coreg 12,5 mg BID, Lipitor 40 mg daily, Losartan 50 mg daily and chlorthalidone 25 mg daily.   home cilostazol and viagra d/c as per d/w Dr. Loza.  Pt. stable to be d/c as per Dr. Neville and to f/u with Dr. Loza in 1-2 week Patient has been given appropriate discharge instructions including medication regimen, access site management and follow up. Prescriptions have been e-prescribed to patient's preferred pharmacy  Prescription for cardiac rehab hasn't  been provided to patient 2/2 technical difficulties with the printer,     Of note: prescription for lipitor sent to patients pharmacy even though its not appearing on d/c summary meds.

## 2021-05-10 NOTE — DISCHARGE NOTE PROVIDER - NSDCCPCAREPLAN_GEN_ALL_CORE_FT
PRINCIPAL DISCHARGE DIAGNOSIS  Diagnosis: CAD (coronary artery disease)  Assessment and Plan of Treatment: You underwent a cardiac catheterization 5/10/21  and the blockage in your  Right coronary artery was opened with stent placement.NEVER MISS A DOSE OF ASPIRIN OR PLAVIX; IF YOU DO, YOU ARE AT RISK OF YOUR STENT CLOSING AND HAVING A HEART ATTACK. DO NOT STOP THESE TWO MEDICATIONS UNLESS INSTRUCTED TO DO SO BY YOUR CARDIOLOGIST  •	Your procedure was done through your wrist  •	You do not need to keep this area covered and you may shower  •	Please avoid any heavy lifting  (no more than 3 to 5 lbs) or strenuous activity for five days  •	If you develop any swelling, bleeding, hardening of the skin (hematoma formation), acute pain, numbness/tingling  in your arm  please contact your doctor immediately or call our 24/7 line: [phone]  We have provided you with a prescription for cardiac rehab which is medically supervised exercise program for your heart and has been shown to improve the quantity and quality of life of people with heart disease like yours. You should attend cardiac rehab 3 times per week for 12 weeks. We have provided you with a list of nearby facilities. Please call your insurance carrier to determine which of these facilities are covered under your plan.        SECONDARY DISCHARGE DIAGNOSES  Diagnosis: HTN (hypertension)  Assessment and Plan of Treatment: Hypertension, commonly called high blood pressure, is when the force of blood pumping through your arteries is too strong. Hypertension forces your heart to work harder to pump blood. Your arteries may become narrow or stiff. Having untreated or uncontrolled hypertension for a long period of time can cause heart attack, stroke, kidney disease, and other problems. If started on a medication, take exactly as prescribed by your health care professional. Maintain a healthy lifestyle and follow up with your primary care physician.  - Please continue to take home meds.       Diagnosis: PAD (peripheral artery disease)  Assessment and Plan of Treatment: You also have peripheral artery disease noted in CT scan of your leg. Please follow up with DR. Loza routinely for further care.   Please stop taking home cilostazol 100 mg two times daily.    Diagnosis: Hyperlipidemia  Assessment and Plan of Treatment: Too much cholesterol in your arteries may lead to a buildup of plaque known as atherosclerosis and can cause heart disease.  Please start taking Lipitor 40 mg daily.        PRINCIPAL DISCHARGE DIAGNOSIS  Diagnosis: CAD (coronary artery disease)  Assessment and Plan of Treatment: You underwent a cardiac catheterization 5/10/21  and the blockage in your  Right coronary artery was opened with stent placement.NEVER MISS A DOSE OF ASPIRIN OR PLAVIX; IF YOU DO, YOU ARE AT RISK OF YOUR STENT CLOSING AND HAVING A HEART ATTACK. DO NOT STOP THESE TWO MEDICATIONS UNLESS INSTRUCTED TO DO SO BY YOUR CARDIOLOGIST  •	Your procedure was done through your wrist  •	You do not need to keep this area covered and you may shower  •	Please avoid any heavy lifting  (no more than 3 to 5 lbs) or strenuous activity for five days  •	If you develop any swelling, bleeding, hardening of the skin (hematoma formation), acute pain, numbness/tingling  in your arm  please contact your doctor immediately or call our 24/7 line: [phone]  Due to technical difficulties with our printer; we werent able to provide you a cardiac rehab prescription. Please obtain one form your outpatoent cradiologist when you see them in the office. cardiac rehab which is medically supervised exercise program for your heart and has been shown to improve the quantity and quality of life of people with heart disease like yours. You should attend cardiac rehab 3 times per week for 12 weeks. Please call your insurance carrier to determine which of these facilities are covered under your plan.        SECONDARY DISCHARGE DIAGNOSES  Diagnosis: HTN (hypertension)  Assessment and Plan of Treatment: Hypertension, commonly called high blood pressure, is when the force of blood pumping through your arteries is too strong. Hypertension forces your heart to work harder to pump blood. Your arteries may become narrow or stiff. Having untreated or uncontrolled hypertension for a long period of time can cause heart attack, stroke, kidney disease, and other problems. If started on a medication, take exactly as prescribed by your health care professional. Maintain a healthy lifestyle and follow up with your primary care physician.  - Please continue to take home meds.       Diagnosis: PAD (peripheral artery disease)  Assessment and Plan of Treatment: You also have peripheral artery disease noted in CT scan of your leg. Please follow up with DR. Loza routinely for further care.   Please stop taking home cilostazol 100 mg two times daily.    Diagnosis: Hyperlipidemia  Assessment and Plan of Treatment: Too much cholesterol in your arteries may lead to a buildup of plaque known as atherosclerosis and can cause heart disease.  Please start taking Lipitor 40 mg daily.       Diagnosis: Stage 3 chronic kidney disease  Assessment and Plan of Treatment: You have a history of chronci kidney diseae, your creatinine which checks the function of your kidney is 1.2 on discharge day. Please have this level checked routinely with your primary care provider,

## 2021-05-11 ENCOUNTER — TRANSCRIPTION ENCOUNTER (OUTPATIENT)
Age: 56
End: 2021-05-11

## 2021-05-11 VITALS
RESPIRATION RATE: 20 BRPM | HEART RATE: 74 BPM | TEMPERATURE: 99 F | SYSTOLIC BLOOD PRESSURE: 161 MMHG | DIASTOLIC BLOOD PRESSURE: 94 MMHG | OXYGEN SATURATION: 97 %

## 2021-05-11 LAB
ANION GAP SERPL CALC-SCNC: 10 MMOL/L — SIGNIFICANT CHANGE UP (ref 5–17)
BUN SERPL-MCNC: 18 MG/DL — SIGNIFICANT CHANGE UP (ref 7–23)
CALCIUM SERPL-MCNC: 9.6 MG/DL — SIGNIFICANT CHANGE UP (ref 8.4–10.5)
CHLORIDE SERPL-SCNC: 102 MMOL/L — SIGNIFICANT CHANGE UP (ref 96–108)
CO2 SERPL-SCNC: 24 MMOL/L — SIGNIFICANT CHANGE UP (ref 22–31)
COVID-19 SPIKE DOMAIN AB INTERP: NEGATIVE — SIGNIFICANT CHANGE UP
COVID-19 SPIKE DOMAIN ANTIBODY RESULT: 0.4 U/ML — SIGNIFICANT CHANGE UP
CREAT SERPL-MCNC: 1.27 MG/DL — SIGNIFICANT CHANGE UP (ref 0.5–1.3)
GLUCOSE SERPL-MCNC: 104 MG/DL — HIGH (ref 70–99)
HCT VFR BLD CALC: 34.8 % — LOW (ref 39–50)
HGB BLD-MCNC: 12.2 G/DL — LOW (ref 13–17)
MAGNESIUM SERPL-MCNC: 2.1 MG/DL — SIGNIFICANT CHANGE UP (ref 1.6–2.6)
MCHC RBC-ENTMCNC: 30.7 PG — SIGNIFICANT CHANGE UP (ref 27–34)
MCHC RBC-ENTMCNC: 35.1 GM/DL — SIGNIFICANT CHANGE UP (ref 32–36)
MCV RBC AUTO: 87.4 FL — SIGNIFICANT CHANGE UP (ref 80–100)
NRBC # BLD: 0 /100 WBCS — SIGNIFICANT CHANGE UP (ref 0–0)
PLATELET # BLD AUTO: 301 K/UL — SIGNIFICANT CHANGE UP (ref 150–400)
POTASSIUM SERPL-MCNC: 4 MMOL/L — SIGNIFICANT CHANGE UP (ref 3.5–5.3)
POTASSIUM SERPL-SCNC: 4 MMOL/L — SIGNIFICANT CHANGE UP (ref 3.5–5.3)
RBC # BLD: 3.98 M/UL — LOW (ref 4.2–5.8)
RBC # FLD: 13.2 % — SIGNIFICANT CHANGE UP (ref 10.3–14.5)
SARS-COV-2 IGG+IGM SERPL QL IA: 0.4 U/ML — SIGNIFICANT CHANGE UP
SARS-COV-2 IGG+IGM SERPL QL IA: NEGATIVE — SIGNIFICANT CHANGE UP
SODIUM SERPL-SCNC: 136 MMOL/L — SIGNIFICANT CHANGE UP (ref 135–145)
WBC # BLD: 8.69 K/UL — SIGNIFICANT CHANGE UP (ref 3.8–10.5)
WBC # FLD AUTO: 8.69 K/UL — SIGNIFICANT CHANGE UP (ref 3.8–10.5)

## 2021-05-11 PROCEDURE — 99239 HOSP IP/OBS DSCHRG MGMT >30: CPT

## 2021-05-11 RX ORDER — ASPIRIN/CALCIUM CARB/MAGNESIUM 324 MG
1 TABLET ORAL
Qty: 30 | Refills: 11
Start: 2021-05-11 | End: 2022-05-05

## 2021-05-11 RX ORDER — CLOPIDOGREL BISULFATE 75 MG/1
1 TABLET, FILM COATED ORAL
Qty: 30 | Refills: 11
Start: 2021-05-11 | End: 2022-05-05

## 2021-05-11 RX ORDER — ATORVASTATIN CALCIUM 80 MG/1
1 TABLET, FILM COATED ORAL
Qty: 30 | Refills: 2
Start: 2021-05-11 | End: 2021-08-08

## 2021-05-11 RX ORDER — LOSARTAN POTASSIUM 100 MG/1
50 TABLET, FILM COATED ORAL DAILY
Refills: 0 | Status: DISCONTINUED | OUTPATIENT
Start: 2021-05-11 | End: 2021-05-11

## 2021-05-11 RX ORDER — CLOPIDOGREL BISULFATE 75 MG/1
1 TABLET, FILM COATED ORAL
Qty: 0 | Refills: 0 | DISCHARGE
Start: 2021-05-11

## 2021-05-11 RX ORDER — TADALAFIL 10 MG/1
1 TABLET, FILM COATED ORAL
Qty: 0 | Refills: 0 | DISCHARGE

## 2021-05-11 RX ORDER — CILOSTAZOL 100 MG/1
1 TABLET ORAL
Qty: 0 | Refills: 0 | DISCHARGE

## 2021-05-11 RX ADMIN — Medication 81 MILLIGRAM(S): at 05:38

## 2021-05-11 RX ADMIN — CARVEDILOL PHOSPHATE 12.5 MILLIGRAM(S): 80 CAPSULE, EXTENDED RELEASE ORAL at 05:38

## 2021-05-11 RX ADMIN — PANTOPRAZOLE SODIUM 40 MILLIGRAM(S): 20 TABLET, DELAYED RELEASE ORAL at 06:05

## 2021-05-11 RX ADMIN — CLOPIDOGREL BISULFATE 75 MILLIGRAM(S): 75 TABLET, FILM COATED ORAL at 05:38

## 2021-05-11 NOTE — DISCHARGE NOTE NURSING/CASE MANAGEMENT/SOCIAL WORK - PATIENT PORTAL LINK FT
You can access the FollowMyHealth Patient Portal offered by [facility] by registering at the following website: [email or web address]. By joining [facility]’s FollowMyHealth portal, you will also be able to view your health information using other applications (apps) compatible with our system.

## 2021-05-13 VITALS
HEART RATE: 81 BPM | WEIGHT: 130.07 LBS | OXYGEN SATURATION: 99 % | RESPIRATION RATE: 16 BRPM | TEMPERATURE: 97 F | SYSTOLIC BLOOD PRESSURE: 156 MMHG | DIASTOLIC BLOOD PRESSURE: 100 MMHG | HEIGHT: 67 IN

## 2021-05-13 RX ORDER — CHLORHEXIDINE GLUCONATE 213 G/1000ML
1 SOLUTION TOPICAL ONCE
Refills: 0 | Status: DISCONTINUED | OUTPATIENT
Start: 2021-05-17 | End: 2021-05-20

## 2021-05-13 NOTE — H&P ADULT - ASSESSMENT
54yo Male, current smoker, with PMHx of HTN, HFmEF (EF 40-45% by echo 9/2020), known CAD s/p PCI HARJINDER mRCA on 5/10/2021 at [facility] , Colonic adenomatous lesion s/p Sigmoidectomy 11/23/20, who in light of patient's risk factors, [place] Class III symptoms, and abnormal CTA, patient presents for peripheral angiogram with intervention if clinically indicated.     ASA Class III  Mallampati Class III    Pt has been taking ASA 81mg QD, but did not take Plavix since his PCI on 5/10/21. Pt loaded with Plavix 600mg PO x 1.  NS @ 75cc/hr    Risks & benefits of procedure and alternative therapy have been explained to the patient including but not limited to: allergic reaction, bleeding with possible need for blood transfusion, infection, renal and vascular compromise, limb damage, arrhythmia, stroke, vessel dissection/perforation, Myocardial infarction, emergent CABG. Informed consent obtained and in chart.       Patient a candidate for sedation: Yes  Sedation Type: Moderate

## 2021-05-13 NOTE — H&P ADULT - NSHPLABSRESULTS_GEN_ALL_CORE
EKG: SR @ 81BPM, Q wave in I, II, aVL                          12.6   9.60  )-----------( 307      ( 17 May 2021 16:02 )             35.8     PT/INR - ( 17 May 2021 16:03 )   PT: 11.2 sec;   INR: 0.93          PTT - ( 17 May 2021 16:03 )  PTT:47.9 sec    05-17    x   |  x   |  16  ----------------------------<  x   x    |  27  |  x     Ca    9.7      17 May 2021 16:02

## 2021-05-13 NOTE — H&P ADULT - HISTORY OF PRESENT ILLNESS
Covid:  Cardiologist: Dr. Loza  Pharmacy: [facility]   Escort:  SKELETON-Confirm symptoms, meds and ROS  54yo Male, current smoker, with PMHx of HTN, HFmEF (EF 40-45% by echo 9/2020), known CAD s/p PCI HARJINDER mRCA on 5/10/2021 at [facility] , Colonic adenomatous lesion s/p Sigmoidectomy 11/23/20,    CTA Aorta w/ LE Runoff (4/22/21) revealing occluded R distal SFA, occluded L SFA, ectatic R common illiac artery and L internal iliac artery, nonvisualization of distal R anterior Tibial (probably occluded)  Cardiac Cath 10/26/20: positive FFR 0.7 of mRCA, LM normal, LAD mild luminal, Ramus small vessel mild diffuse, mLCx 30%, dLCx 40%, OM1 normal  Echocardiogram 9/2020 revealing LVEF 40-45%, moderate concentric LVH, Grade 1 Diastolic Dysfunction, resting regional wall motion abnormalities of the inferoseptal wall.   Covid:  Cardiologist: Dr. Loza  Pharmacy: [facility]   Escort:  SKELETON-Confirm peripheral symptoms, meds and ROS  56yo Male, current smoker, with PMHx of HTN, HFmEF (EF 40-45% by echo 9/2020), known CAD s/p PCI HARJINDER mRCA on 5/10/2021 at [facility] , Colonic adenomatous lesion s/p Sigmoidectomy 11/23/20,    CTA Aorta w/ LE Runoff (4/22/21) revealing occluded R distal SFA, occluded L SFA, ectatic R common illiac artery and L internal iliac artery, nonvisualization of distal R anterior Tibial (probably occluded)  Cardiac Cath 10/26/20: positive FFR 0.7 of mRCA, LM normal, LAD mild luminal, Ramus small vessel mild diffuse, mLCx 30%, dLCx 40%, OM1 normal  Echocardiogram 9/2020 revealing LVEF 40-45%, moderate concentric LVH, Grade 1 Diastolic Dysfunction, resting regional wall motion abnormalities of the inferoseptal wall.   Covid: at [place] 05/16/2021  Cardiologist: Dr. Loza  Pharmacy: [facility]   Patient is a 56yo Male, current smoker, with PMHx of HTN, HFmEF (EF 40-45% by echo 9/2020), known CAD s/p PCI HARJINDER mRCA on 5/10/2021 at [facility] , Colonic adenomatous lesion s/p Sigmoidectomy 11/23/20, who presented to his cardiologist, Dr. Loza for follow-up and has been having weakness and a "rubbery feeling" in his legs while walking 4-5 blocks. Patient denies CP, SOB, dizziness, diaphoresis, LE edema, orthopnea, palpitations, PND, fatigue, n/v/d, syncope, non-healing ulcers, numbness/tingling, color changes, fever, chills, or sick contacts.   CTA Aorta w/ LE Runoff (4/22/21) revealing occluded R distal SFA, occluded L SFA, ectatic R common illiac artery and L internal iliac artery, nonvisualization of distal R anterior Tibial (probably occluded). Cardiac Cath 10/26/20: positive FFR 0.7 of mRCA, LM normal, LAD mild luminal, Ramus small vessel mild diffuse, mLCx 30%, dLCx 40%, OM1 normal.  Echocardiogram 9/2020 revealing LVEF 40-45%, moderate concentric LVH, Grade 1 Diastolic Dysfunction, resting regional wall motion abnormalities of the inferoseptal wall.  In light of patient's risk factors, [place] Class III symptoms, and abnormal CTA, patient presents for peripheral angiogram with intervention if clinically indicated.

## 2021-05-17 ENCOUNTER — INPATIENT (INPATIENT)
Facility: HOSPITAL | Age: 56
LOS: 2 days | Discharge: HOME | DRG: 270 | End: 2021-05-20
Attending: INTERNAL MEDICINE | Admitting: INTERNAL MEDICINE
Payer: MEDICAID

## 2021-05-17 ENCOUNTER — TRANSCRIPTION ENCOUNTER (OUTPATIENT)
Age: 56
End: 2021-05-17

## 2021-05-17 DIAGNOSIS — Z90.49 ACQUIRED ABSENCE OF OTHER SPECIFIED PARTS OF DIGESTIVE TRACT: Chronic | ICD-10-CM

## 2021-05-17 LAB
ALBUMIN SERPL ELPH-MCNC: 4.6 G/DL — SIGNIFICANT CHANGE UP (ref 3.3–5)
ALP SERPL-CCNC: 141 U/L — HIGH (ref 40–120)
ALT FLD-CCNC: 18 U/L — SIGNIFICANT CHANGE UP (ref 10–45)
ANION GAP SERPL CALC-SCNC: 14 MMOL/L — SIGNIFICANT CHANGE UP (ref 5–17)
APTT BLD: 47.9 SEC — HIGH (ref 27.5–35.5)
AST SERPL-CCNC: 27 U/L — SIGNIFICANT CHANGE UP (ref 10–40)
BASOPHILS # BLD AUTO: 0.04 K/UL — SIGNIFICANT CHANGE UP (ref 0–0.2)
BASOPHILS NFR BLD AUTO: 0.4 % — SIGNIFICANT CHANGE UP (ref 0–2)
BILIRUB SERPL-MCNC: 0.7 MG/DL — SIGNIFICANT CHANGE UP (ref 0.2–1.2)
BUN SERPL-MCNC: 16 MG/DL — SIGNIFICANT CHANGE UP (ref 7–23)
CALCIUM SERPL-MCNC: 9.7 MG/DL — SIGNIFICANT CHANGE UP (ref 8.4–10.5)
CHLORIDE SERPL-SCNC: 92 MMOL/L — LOW (ref 96–108)
CHOLEST SERPL-MCNC: 201 MG/DL — HIGH
CO2 SERPL-SCNC: 27 MMOL/L — SIGNIFICANT CHANGE UP (ref 22–31)
CREAT SERPL-MCNC: 1.51 MG/DL — HIGH (ref 0.5–1.3)
EOSINOPHIL # BLD AUTO: 0.23 K/UL — SIGNIFICANT CHANGE UP (ref 0–0.5)
EOSINOPHIL NFR BLD AUTO: 2.4 % — SIGNIFICANT CHANGE UP (ref 0–6)
GLUCOSE SERPL-MCNC: 119 MG/DL — HIGH (ref 70–99)
HCT VFR BLD CALC: 35.8 % — LOW (ref 39–50)
HDLC SERPL-MCNC: 67 MG/DL — SIGNIFICANT CHANGE UP
HGB BLD-MCNC: 12.6 G/DL — LOW (ref 13–17)
IMM GRANULOCYTES NFR BLD AUTO: 0.5 % — SIGNIFICANT CHANGE UP (ref 0–1.5)
INR BLD: 0.93 — SIGNIFICANT CHANGE UP (ref 0.88–1.16)
LIPID PNL WITH DIRECT LDL SERPL: 85 MG/DL — SIGNIFICANT CHANGE UP
LYMPHOCYTES # BLD AUTO: 1.73 K/UL — SIGNIFICANT CHANGE UP (ref 1–3.3)
LYMPHOCYTES # BLD AUTO: 18 % — SIGNIFICANT CHANGE UP (ref 13–44)
MCHC RBC-ENTMCNC: 30.5 PG — SIGNIFICANT CHANGE UP (ref 27–34)
MCHC RBC-ENTMCNC: 35.2 GM/DL — SIGNIFICANT CHANGE UP (ref 32–36)
MCV RBC AUTO: 86.7 FL — SIGNIFICANT CHANGE UP (ref 80–100)
MONOCYTES # BLD AUTO: 0.92 K/UL — HIGH (ref 0–0.9)
MONOCYTES NFR BLD AUTO: 9.6 % — SIGNIFICANT CHANGE UP (ref 2–14)
NEUTROPHILS # BLD AUTO: 6.63 K/UL — SIGNIFICANT CHANGE UP (ref 1.8–7.4)
NEUTROPHILS NFR BLD AUTO: 69.1 % — SIGNIFICANT CHANGE UP (ref 43–77)
NON HDL CHOLESTEROL: 134 MG/DL — HIGH
NRBC # BLD: 0 /100 WBCS — SIGNIFICANT CHANGE UP (ref 0–0)
PLATELET # BLD AUTO: 307 K/UL — SIGNIFICANT CHANGE UP (ref 150–400)
POTASSIUM SERPL-MCNC: 3.7 MMOL/L — SIGNIFICANT CHANGE UP (ref 3.5–5.3)
POTASSIUM SERPL-SCNC: 3.7 MMOL/L — SIGNIFICANT CHANGE UP (ref 3.5–5.3)
PROT SERPL-MCNC: 7.6 G/DL — SIGNIFICANT CHANGE UP (ref 6–8.3)
PROTHROM AB SERPL-ACNC: 11.2 SEC — SIGNIFICANT CHANGE UP (ref 10.6–13.6)
RBC # BLD: 4.13 M/UL — LOW (ref 4.2–5.8)
RBC # FLD: 13.5 % — SIGNIFICANT CHANGE UP (ref 10.3–14.5)
SODIUM SERPL-SCNC: 133 MMOL/L — LOW (ref 135–145)
TRIGL SERPL-MCNC: 246 MG/DL — HIGH
WBC # BLD: 9.6 K/UL — SIGNIFICANT CHANGE UP (ref 3.8–10.5)
WBC # FLD AUTO: 9.6 K/UL — SIGNIFICANT CHANGE UP (ref 3.8–10.5)

## 2021-05-17 PROCEDURE — 75716 ARTERY X-RAYS ARMS/LEGS: CPT | Mod: 26,59

## 2021-05-17 PROCEDURE — 37231: CPT

## 2021-05-17 PROCEDURE — 36252 INS CATH REN ART 1ST BILAT: CPT

## 2021-05-17 PROCEDURE — 93010 ELECTROCARDIOGRAM REPORT: CPT

## 2021-05-17 RX ORDER — SODIUM CHLORIDE 9 MG/ML
500 INJECTION INTRAMUSCULAR; INTRAVENOUS; SUBCUTANEOUS
Refills: 0 | Status: DISCONTINUED | OUTPATIENT
Start: 2021-05-17 | End: 2021-05-20

## 2021-05-17 RX ORDER — CHLORTHALIDONE 50 MG
25 TABLET ORAL DAILY
Refills: 0 | Status: DISCONTINUED | OUTPATIENT
Start: 2021-05-17 | End: 2021-05-20

## 2021-05-17 RX ORDER — CLOPIDOGREL BISULFATE 75 MG/1
75 TABLET, FILM COATED ORAL DAILY
Refills: 0 | Status: DISCONTINUED | OUTPATIENT
Start: 2021-05-18 | End: 2021-05-20

## 2021-05-17 RX ORDER — CARVEDILOL PHOSPHATE 80 MG/1
12.5 CAPSULE, EXTENDED RELEASE ORAL EVERY 12 HOURS
Refills: 0 | Status: DISCONTINUED | OUTPATIENT
Start: 2021-05-17 | End: 2021-05-20

## 2021-05-17 RX ORDER — ATORVASTATIN CALCIUM 80 MG/1
40 TABLET, FILM COATED ORAL AT BEDTIME
Refills: 0 | Status: DISCONTINUED | OUTPATIENT
Start: 2021-05-17 | End: 2021-05-20

## 2021-05-17 RX ORDER — LOSARTAN POTASSIUM 100 MG/1
50 TABLET, FILM COATED ORAL DAILY
Refills: 0 | Status: DISCONTINUED | OUTPATIENT
Start: 2021-05-18 | End: 2021-05-20

## 2021-05-17 RX ORDER — ASPIRIN/CALCIUM CARB/MAGNESIUM 324 MG
81 TABLET ORAL DAILY
Refills: 0 | Status: DISCONTINUED | OUTPATIENT
Start: 2021-05-18 | End: 2021-05-20

## 2021-05-17 RX ORDER — SODIUM CHLORIDE 9 MG/ML
500 INJECTION INTRAMUSCULAR; INTRAVENOUS; SUBCUTANEOUS
Refills: 0 | Status: DISCONTINUED | OUTPATIENT
Start: 2021-05-17 | End: 2021-05-17

## 2021-05-17 RX ORDER — CLOPIDOGREL BISULFATE 75 MG/1
600 TABLET, FILM COATED ORAL ONCE
Refills: 0 | Status: COMPLETED | OUTPATIENT
Start: 2021-05-17 | End: 2021-05-17

## 2021-05-17 RX ORDER — POTASSIUM CHLORIDE 20 MEQ
40 PACKET (EA) ORAL ONCE
Refills: 0 | Status: COMPLETED | OUTPATIENT
Start: 2021-05-17 | End: 2021-05-17

## 2021-05-17 RX ORDER — FAMOTIDINE 10 MG/ML
40 INJECTION INTRAVENOUS DAILY
Refills: 0 | Status: DISCONTINUED | OUTPATIENT
Start: 2021-05-17 | End: 2021-05-20

## 2021-05-17 RX ADMIN — CARVEDILOL PHOSPHATE 12.5 MILLIGRAM(S): 80 CAPSULE, EXTENDED RELEASE ORAL at 22:56

## 2021-05-17 RX ADMIN — CLOPIDOGREL BISULFATE 600 MILLIGRAM(S): 75 TABLET, FILM COATED ORAL at 17:02

## 2021-05-17 RX ADMIN — SODIUM CHLORIDE 100 MILLILITER(S): 9 INJECTION INTRAMUSCULAR; INTRAVENOUS; SUBCUTANEOUS at 17:03

## 2021-05-17 RX ADMIN — ATORVASTATIN CALCIUM 40 MILLIGRAM(S): 80 TABLET, FILM COATED ORAL at 22:56

## 2021-05-17 RX ADMIN — FAMOTIDINE 40 MILLIGRAM(S): 10 INJECTION INTRAVENOUS at 22:56

## 2021-05-17 RX ADMIN — Medication 40 MILLIEQUIVALENT(S): at 17:02

## 2021-05-17 NOTE — DISCHARGE NOTE PROVIDER - PROVIDER TOKENS
PROVIDER:[TOKEN:[[id number]],FOLLOWUP:[1 week]] PROVIDER:[TOKEN:[[id number]],FOLLOWUP:[2 weeks]],PROVIDER:[TOKEN:[[id number]],FOLLOWUP:[1 month]]

## 2021-05-17 NOTE — DISCHARGE NOTE PROVIDER - HOSPITAL COURSE
54yo Male, current smoker, with PMHx of HTN, HFmEF (EF 40-45% by echo 9/2020), known CAD s/p PCI HARJINDER mRCA on 5/10/2021 at [facility] , Colonic adenomatous lesion s/p Sigmoidectomy 11/23/20, who presented to his cardiologist, Dr. Loza for follow-up and has been having weakness and a "rubbery feeling" in his legs while walking 4-5 blocks. Patient denies CP, SOB, dizziness, diaphoresis, LE edema, orthopnea, palpitations, PND, fatigue, n/v/d, syncope, non-healing ulcers, numbness/tingling, color changes, fever, chills, or sick contacts.   CTA Aorta w/ LE Runoff (4/22/21) revealing occluded R distal SFA, occluded L SFA, ectatic R common illiac artery and L internal iliac artery, nonvisualization of distal R anterior Tibial (probably occluded). Cardiac Cath 10/26/20: positive FFR 0.7 of mRCA, LM normal, LAD mild luminal, Ramus small vessel mild diffuse, mLCx 30%, dLCx 40%, OM1 normal.  Echocardiogram 9/2020 revealing LVEF 40-45%, moderate concentric LVH, Grade 1 Diastolic Dysfunction, resting regional wall motion abnormalities of the inferoseptal wall.  In light of patient's risk factors, [place] Class III symptoms, and abnormal CTA, patient presents for peripheral angiogram with intervention if clinically indicated.     Patient is s/p cardiac cath on 05/17/2021 - Atherectomy/PTCA/ SES x 2 L SFA  prox to distal. Findings: B/L iliacs patent, B/L CFA patent, R groin PC, EF 40-45%    No significant events on telemetry overnight. Repeat EKG without ischemic changes. Patient has been medically cleared for discharge as per Dr. Plaza. Patient has been given appropriate discharge instructions including medication regimen, access site management and follow up. Medications that patient needs refills on have been e-prescribed to preferred pharmacy.      **Incomplete**- update abd xray findings/outcome    56yo Male, current smoker, with PMHx of HTN, HFmEF (EF 40-45% by echo 9/2020), known CAD s/p PCI HARJINDER mRCA on 5/10/2021 at [facility] , Colonic adenomatous lesion s/p Sigmoidectomy 11/23/20, who presented to his cardiologist, Dr. Loza for follow-up and has been having weakness and a "rubbery feeling" in his legs while walking 4-5 blocks. Patient denies CP, SOB, dizziness, diaphoresis, LE edema, orthopnea, palpitations, PND, fatigue, n/v/d, syncope, non-healing ulcers, numbness/tingling, color changes, fever, chills, or sick contacts.   CTA Aorta w/ LE Runoff (4/22/21) revealing occluded R distal SFA, occluded L SFA, ectatic R common illiac artery and L internal iliac artery, nonvisualization of distal R anterior Tibial (probably occluded). Cardiac Cath 10/26/20: positive FFR 0.7 of mRCA, LM normal, LAD mild luminal, Ramus small vessel mild diffuse, mLCx 30%, dLCx 40%, OM1 normal.  Echocardiogram 9/2020 revealing LVEF 40-45%, moderate concentric LVH, Grade 1 Diastolic Dysfunction, resting regional wall motion abnormalities of the inferoseptal wall.  In light of patient's risk factors, [place] Class III symptoms, and abnormal CTA, patient presents for peripheral angiogram with intervention if clinically indicated.     s/p cardiac cath on 05/17/2021 - Atherectomy/PTCA/ SES x 2 L SFA  prox to distal. Findings: B/L iliacs patent, B/L CFA patent.  Access: R groin Perclose; no hematoma, no bleed.    *Of note, on 5/18 AM pt noted to be continuosly belching for a prolonged period of time and subsequently reported severe epigastric pain and began vomiting small amounts of non-bloody bilious vomiting. Pt was given Zofran 4mg IVP x1 and Maalox and reported modest relief of pain and pt continues dry-heaving. Pt also endorsed concern around a painful bump in his mid epigastric region which was tender to palpation, soft, and nonreducible. Surgery was consulted and report that this bump is likely a lipoma, and recommende abdominal xray and LFTs. LFT's WNL. Abdominal xray revealing: __________________.    Pt is asymptomatic at this time and denies chest pain, SOB, HERBERT, palpitations, dizziness, LOC, N/V, diaphoresis, orthopnea/PND, and leg swelling. Pt able to ambulate and void without complication. VSS. Labs and telemetry reviewed. R groin access site stable and dressing C/D/I. Pt is to continue taking Aspirin 81mg QD, Plavix 75mg QD, Atorvastatin 40mg QHS, and Pantoprazole 40mg QD. Pt's Famotidine was stopped.  Pt should continue taking all other previous home meds as prescribed.. Pt is a candidate for discharge per Dr. Plaza. Pt given appropriate discharge instructions and any new medications were sent to their pharmacy. Pt instructed to f/u with Dr. Loza in 1-2 weeks. **Incomplete**- update abd xray findings/outcome    54yo Male, current smoker, with PMHx of HTN, HFmEF (EF 40-45% by echo 9/2020), known CAD s/p PCI HARJINDER mRCA on 5/10/2021 at [facility] , Colonic adenomatous lesion s/p Sigmoidectomy 11/23/20, who presented to his cardiologist, Dr. Loza for follow-up and has been having weakness and a "rubbery feeling" in his legs while walking 4-5 blocks. Patient denies CP, SOB, dizziness, diaphoresis, LE edema, orthopnea, palpitations, PND, fatigue, n/v/d, syncope, non-healing ulcers, numbness/tingling, color changes, fever, chills, or sick contacts.   CTA Aorta w/ LE Runoff (4/22/21) revealing occluded R distal SFA, occluded L SFA, ectatic R common illiac artery and L internal iliac artery, nonvisualization of distal R anterior Tibial (probably occluded). Cardiac Cath 10/26/20: positive FFR 0.7 of mRCA, LM normal, LAD mild luminal, Ramus small vessel mild diffuse, mLCx 30%, dLCx 40%, OM1 normal.  Echocardiogram 9/2020 revealing LVEF 40-45%, moderate concentric LVH, Grade 1 Diastolic Dysfunction, resting regional wall motion abnormalities of the inferoseptal wall.  In light of patient's risk factors, Sat[place] Class III symptoms, and abnormal CTA, patient presents for peripheral angiogram with intervention if clinically indicated.     s/p cardiac cath on 05/17/2021 - Atherectomy/PTCA/ SES x 2 L SFA  prox to distal. Findings: B/L iliacs patent, B/L CFA patent.  Access: R groin Perclose; no hematoma, no bleed.    *Of note, on 5/18 AM pt noted to be continuosly belching for a prolonged period of time and subsequently reported severe epigastric pain and began vomiting small amounts of non-bloody bilious vomiting. Pt was given Zofran 4mg IVP x1 and Maalox and reported modest relief of pain and pt continues dry-heaving. Pt also endorsed concern around a painful bump in his mid epigastric region which was tender to palpation, soft, and nonreducible. Surgery was consulted and report that this bump is likely a lipoma, and recommende abdominal xray and LFTs. LFT's WNL. Abdominal xray revealing: __________________.    Pt is asymptomatic at this time and denies chest pain, SOB, HERBERT, palpitations, dizziness, LOC, N/V, diaphoresis, orthopnea/PND, and leg swelling. Pt able to ambulate and void without complication. VSS. Labs and telemetry reviewed. R groin access site stable and dressing C/D/I. Pt is to continue taking Aspirin 81mg QD, Plavix 75mg QD, Atorvastatin 40mg QHS, and Pantoprazole 40mg QD. Pt's Famotidine was stopped.  Pt should continue taking all other previous home meds as prescribed.. Pt is a candidate for discharge per Dr. Plaza. Pt given appropriate discharge instructions and any new medications were sent to their pharmacy. Pt instructed to f/u with Dr. Loza in 1-2 weeks. INCOMPLETE    54yo Male, current smoker, with PMHx of HTN, HFmEF (EF 40-45% by echo 9/2020), known CAD s/p PCI HARJINDER mRCA on 5/10/2021 at [facility] , Colonic adenomatous lesion s/p Sigmoidectomy 11/23/20, who presented to his cardiologist, Dr. Loza for follow-up and has been having weakness and a "rubbery feeling" in his legs while walking 4-5 blocks. Patient denies CP, SOB, dizziness, diaphoresis, LE edema, orthopnea, palpitations, PND, fatigue, n/v/d, syncope, non-healing ulcers, numbness/tingling, color changes, fever, chills, or sick contacts.   CTA Aorta w/ LE Runoff (4/22/21) revealing occluded R distal SFA, occluded L SFA, ectatic R common illiac artery and L internal iliac artery, nonvisualization of distal R anterior Tibial (probably occluded). Cardiac Cath 10/26/20: positive FFR 0.7 of mRCA, LM normal, LAD mild luminal, Ramus small vessel mild diffuse, mLCx 30%, dLCx 40%, OM1 normal.  Echocardiogram 9/2020 revealing LVEF 40-45%, moderate concentric LVH, Grade 1 Diastolic Dysfunction, resting regional wall motion abnormalities of the inferoseptal wall.  In light of patient's risk factors, [place] Class III symptoms, and abnormal CTA, patient presents for peripheral angiogram with intervention if clinically indicated.     s/p cardiac cath on 05/17/2021 - Atherectomy/PTCA/ SES x 2 L SFA  prox to distal. Findings: B/L iliacs patent, B/L CFA patent.  Access: R groin Perclose; no hematoma, no bleed.    *Of note, on 5/18 AM pt noted to be continuosly belching for a prolonged period of time and subsequently reported severe epigastric pain and began vomiting small amounts of non-bloody bilious vomiting. Pt was given Zofran 4mg IVP x1 and Maalox and reported modest relief of pain and pt continues dry-heaving. Pt also endorsed concern around a painful bump in his mid epigastric region which was tender to palpation, soft, and nonreducible. Surgery was consulted and report that this bump is likely a lipoma, and recommende abdominal xray and LFTs. LFT's WNL. Abdominal xray revealing: Mildly distended loops of small bowel. Gas is seen in nondistended colon to the rectosigmoid region. Abbreviated differential includes a small bowel ileus versus a low-grade and/or early small bowel obstruction. Additional follow-up film upright and supine imaging and/or a CT scan may be of value as clinically indicated. CT A/P w/ and PO and IV contrast ordered _____.    Pt is asymptomatic at this time and denies chest pain, SOB, HERBERT, palpitations, dizziness, LOC, N/V, diaphoresis, orthopnea/PND, and leg swelling. Pt able to ambulate and void without complication. VSS. Labs and telemetry reviewed. R groin access site stable and dressing C/D/I. Pt is to continue taking Aspirin 81mg QD, Plavix 75mg QD, Atorvastatin 40mg QHS, and Pantoprazole 40mg QD. Pt's Famotidine was stopped.  Pt should continue taking all other previous home meds as prescribed.. Pt is a candidate for discharge per Dr. Plaza. Pt given appropriate discharge instructions and any new medications were sent to their pharmacy. Pt instructed to f/u with Dr. Loza in 1-2 weeks. 54yo Male, current smoker, with PMHx of HTN, HFmEF (EF 40-45% by echo 9/2020), known CAD s/p PCI HARJINDER mRCA on 5/10/2021 at [facility] , Colonic adenomatous lesion s/p Sigmoidectomy 11/23/20, who presented to his cardiologist, Dr. Loza for follow-up and has been having weakness and a "rubbery feeling" in his legs while walking 4-5 blocks. Patient denies CP, SOB, dizziness, diaphoresis, LE edema, orthopnea, palpitations, PND, fatigue, n/v/d, syncope, non-healing ulcers, numbness/tingling, color changes, fever, chills, or sick contacts. CTA Aorta w/ LE Runoff (4/22/21) revealing occluded R distal SFA, occluded L SFA, ectatic R common illiac artery and L internal iliac artery, nonvisualization of distal R anterior Tibial (probably occluded). Cardiac Cath 10/26/20: positive FFR 0.7 of mRCA, LM normal, LAD mild luminal, Ramus small vessel mild diffuse, mLCx 30%, dLCx 40%, OM1 normal.  Echocardiogram 9/2020 revealing LVEF 40-45%, moderate concentric LVH, Grade 1 Diastolic Dysfunction, resting regional wall motion abnormalities of the inferoseptal wall.  In light of patient's risk factors, Satish Class III symptoms, and abnormal CTA, patient presents for peripheral angiogram with intervention if clinically indicated.     s/p cardiac cath on 05/17/2021 - Atherectomy/PTCA/ SES x 2 L SFA  prox to distal. Findings: B/L iliacs patent, B/L CFA patent. Access: R groin Perclose; no hematoma, no bleed.    Hospital course c/b severe epigastric abdominal pain and began vomiting on 5/18 AM prior to discharge. Abdominal xray 5/18: Mildly distended loops of small bowel. Gas is seen in nondistended colon to the rectosigmoid region. Abbreviated differential includes a small bowel ileus versus a low-grade and/or early small bowel obstruction. Surgery consulted and rec CT A/P w/ PO/IV contrast. CT A/P 5/18: Mild ascites with the dominant portion of fluid seen around and engorged pancreas. Please correlate for acute pancreatitis. Focus of hypoattenuation in the pancreatic head which may be related to necrosis. Neoplasm is  not excluded. Calcifications in the pancreatic head/uncinate process, compatible with chronic pancreatitis. GI consulted and rec MRI w/ contrast pancreatic protocol w/ Amylse / Lipase. Amylase 1139 and Lipase 1875. MRI 5/19/21: Acute pancreatitis. Irregular focus of hypoenhancement in the head of the pancreas where there are pancreatic calcifications, as seen on CT. Consider outpatient ERCP to exclude neoplasm. General surgery signed off as no acute surgical intervention however consulted surgical oncologist Dr. Kaufman. Per surgical oncologist, no inpatient workup but should follow up               Pt is asymptomatic at this time and denies chest pain, SOB, HERBERT, palpitations, dizziness, LOC, N/V, diaphoresis, orthopnea/PND, and leg swelling. Pt able to ambulate and void without complication. VSS. Labs and telemetry reviewed. R groin access site stable and dressing C/D/I. Pt is to continue taking Aspirin 81mg QD, Plavix 75mg QD, Atorvastatin 40mg QHS, and Pantoprazole 40mg QD. Pt's Famotidine was stopped.  Pt should continue taking all other previous home meds as prescribed.. Pt is a candidate for discharge per Dr. Plaza. Pt given appropriate discharge instructions and any new medications were sent to their pharmacy. Pt instructed to f/u with Dr. Loza in 1-2 weeks. 54yo Male, current smoker, with PMHx of HTN, HFmEF (EF 40-45% by echo 9/2020), known CAD s/p PCI HARJINDER mRCA on 5/10/2021 at [facility] , Colonic adenomatous lesion s/p Sigmoidectomy 11/23/20, who presented to his cardiologist, Dr. Loza for follow-up and has been having weakness and a "rubbery feeling" in his legs while walking 4-5 blocks. Patient denies CP, SOB, dizziness, diaphoresis, LE edema, orthopnea, palpitations, PND, fatigue, n/v/d, syncope, non-healing ulcers, numbness/tingling, color changes, fever, chills, or sick contacts. CTA Aorta w/ LE Runoff (4/22/21) revealing occluded R distal SFA, occluded L SFA, ectatic R common illiac artery and L internal iliac artery, nonvisualization of distal R anterior Tibial (probably occluded). Cardiac Cath 10/26/20: positive FFR 0.7 of mRCA, LM normal, LAD mild luminal, Ramus small vessel mild diffuse, mLCx 30%, dLCx 40%, OM1 normal.  Echocardiogram 9/2020 revealing LVEF 40-45%, moderate concentric LVH, Grade 1 Diastolic Dysfunction, resting regional wall motion abnormalities of the inferoseptal wall.  In light of patient's risk factors, [place] Class III symptoms, and abnormal CTA, patient presents for peripheral angiogram with intervention if clinically indicated.     s/p cardiac cath on 05/17/2021 - Atherectomy/PTCA/ SES x 2 L SFA  prox to distal. Findings: B/L iliacs patent, B/L CFA patent. Access: R groin Perclose; no hematoma, no bleed.    Hospital course c/b severe epigastric abdominal pain and began vomiting on 5/18 AM prior to discharge. Abdominal xray 5/18: Mildly distended loops of small bowel. Gas is seen in nondistended colon to the rectosigmoid region. Abbreviated differential includes a small bowel ileus versus a low-grade and/or early small bowel obstruction. Surgery consulted and rec CT A/P w/ PO/IV contrast. CT A/P 5/18: Mild ascites with the dominant portion of fluid seen around and engorged pancreas. Please correlate for acute pancreatitis. Focus of hypoattenuation in the pancreatic head which may be related to necrosis. Neoplasm is  not excluded. Calcifications in the pancreatic head/uncinate process, compatible with chronic pancreatitis. GI consulted and rec MRI w/ contrast pancreatic protocol w/ Amylse / Lipase. Amylase 1139 and Lipase 1875. MRI 5/19/21: Acute pancreatitis. Irregular focus of hypoenhancement in the head of the pancreas where there are pancreatic calcifications, as seen on CT. Consider outpatient ERCP to exclude neoplasm. Medicine consulted for transfer however being that patient's symptoms have nearly resolved and now eating/ambulating, would not recommend transfer or continued admission unless needed by surgery. General surgery signed off as no acute surgical intervention however consulted surgical oncologist Dr. Kaufman. Per surgical oncologist, no inpatient workup but should follow up closely outpatient with plan for outpatient cholecystectomy.     Patient seen and examined at the bedside. Patient states symptoms have nearly resolved and has been eating. No N/V/D. Exam significant for mild distention and mild tenderness of abdomen. (+) BS throughout. VSS. No overnight events on tele. R groin access site stable with distal pulses 1-2+. Pt is to continue taking Aspirin 81mg QD, Plavix 75mg QD, Atorvastatin 40mg QHS, and Pantoprazole 40mg QD. Pt's Famotidine was stopped. Pt should continue taking all other previous home meds as prescribed.. Pt is a candidate for discharge per Dr. Plaza. Pt given appropriate discharge instructions and any new medications were sent to their pharmacy. Pt instructed to f/u with Dr. Loza in 1-2 weeks.

## 2021-05-17 NOTE — DISCHARGE NOTE PROVIDER - NSDCMRMEDTOKEN_GEN_ALL_CORE_FT
aspirin 81 mg oral delayed release tablet: 1 tab(s) orally once a day   atorvastatin 40 mg oral tablet: 1 tab(s) orally once a day (at bedtime)  cardiac rehab: cardiac rehab 3 X week for 12 weeks fro diagnosis of coronary artery disease s/p stent  carvedilol 12.5 mg oral tablet: 1 tab(s) orally 2 times a day  chlorthalidone 25 mg oral tablet: 1 tab(s) orally once a day  clopidogrel 75 mg oral tablet: 1 tab(s) orally once a day    PT HAS NOT YET PICKED UP MEDICATION FROM PHARMACY  famotidine 40 mg oral tablet: 1 tab(s) orally once a day  losartan 50 mg oral tablet: 1 tab(s) orally once a day  Vitamin D2 50,000 intl units (1.25 mg) oral capsule: 1 cap(s) orally once a day   aspirin 81 mg oral delayed release tablet: 1 tab(s) orally once a day   atorvastatin 40 mg oral tablet: 1 tab(s) orally once a day (at bedtime)  cardiac rehab: cardiac rehab 3 X week for 12 weeks fro diagnosis of coronary artery disease s/p stent  Cardiac Rehabilitation:   carvedilol 12.5 mg oral tablet: 1 tab(s) orally 2 times a day  chlorthalidone 25 mg oral tablet: 1 tab(s) orally once a day  clopidogrel 75 mg oral tablet: 1 tab(s) orally once a day    PT HAS NOT YET PICKED UP MEDICATION FROM PHARMACY  losartan 50 mg oral tablet: 1 tab(s) orally once a day  pantoprazole 40 mg oral delayed release tablet: 1 tab(s) orally once a day   Vitamin D2 50,000 intl units (1.25 mg) oral capsule: 1 cap(s) orally once a day   aspirin 81 mg oral delayed release tablet: 1 tab(s) orally once a day   atorvastatin 40 mg oral tablet: 1 tab(s) orally once a day (at bedtime)  carvedilol 12.5 mg oral tablet: 1 tab(s) orally 2 times a day  chlorthalidone 25 mg oral tablet: 1 tab(s) orally once a day  clopidogrel 75 mg oral tablet: 1 tab(s) orally once a day    PT HAS NOT YET PICKED UP MEDICATION FROM PHARMACY  losartan 50 mg oral tablet: 1 tab(s) orally once a day  pantoprazole 40 mg oral delayed release tablet: 1 tab(s) orally once a day   Vitamin D2 50,000 intl units (1.25 mg) oral capsule: 1 cap(s) orally once a day   acetaminophen 325 mg oral tablet: 2 tab(s) orally every 6 hours  aspirin 81 mg oral delayed release tablet: 1 tab(s) orally once a day   atorvastatin 40 mg oral tablet: 1 tab(s) orally once a day (at bedtime)  carvedilol 12.5 mg oral tablet: 1 tab(s) orally 2 times a day  chlorthalidone 25 mg oral tablet: 1 tab(s) orally once a day  clopidogrel 75 mg oral tablet: 1 tab(s) orally once a day    PT HAS NOT YET PICKED UP MEDICATION FROM PHARMACY  losartan 50 mg oral tablet: 1 tab(s) orally once a day  pantoprazole 40 mg oral delayed release tablet: 1 tab(s) orally once a day   Vitamin D2 50,000 intl units (1.25 mg) oral capsule: 1 cap(s) orally once a day

## 2021-05-17 NOTE — DISCHARGE NOTE PROVIDER - CARE PROVIDER_API CALL
Milo Loza  CARDIOVASCULAR DISEASE  [address] [place] [address]  Phone: [phone]  Fax: [phone]  Follow Up Time: 1 week   Milo Loza  CARDIOVASCULAR DISEASE  [address] [place] [address]  Phone: [phone]  Fax: [phone]  Follow Up Time: 2 weeks    Blossom Kaufman  Surgical Oncology  [address] [address]  Phone: [phone]  Fax: [phone]  Follow Up Time: 1 month

## 2021-05-17 NOTE — DISCHARGE NOTE PROVIDER - NSDCFUADDINST_GEN_ALL_CORE_FT
ACTIVITY:     Do not drive or operate hazardous machinery for 24 hours.                        Limit your physical activities for 24 hours.                        Do not engage in in sports, heavy work or heavy lifting for 72 hours.    DIET:             You may resume your regular diet.                        Drinking extra fluids (water, juice) is encouraged.                        Abstain from alcohol for 24 hours.    HYGIENE:     Shower and take off the puncture site dressing the next morning.                        If you received a "closure device" in your groin, you may shower the next day, but not take a bath, hot tub or swim for 5    days.    PAIN MEDICATION:   A mild pain at the puncture siteis not unusual.                                        You may take Tylenol 1-2 tabs every 4-6 hours as needed, for one day.                                        If the pain persists contact the office at [phone]    SPECIAL INSTRUCTIONS:  Signs and symptoms to look out for:       1. Neo bleeding from the puncture site is an emergency.            Put direct pressure on the site and go directly to your local Emergency   Room for treatment.       2. Bleeding under the skin may also occur and a small "black and blue may be expected.         If there appears to be an expanding mass or swelling around the puncture site, apply manual compression and go          immediately to your local Emergency Room for treatment.       3. If your foot/leg or hand/arm (puncture site) becomes cool or blue and/or you are unable to move it, this must be treated as an   emergency.         go directly to your local emergency room for treatment.       4. Excessive puncture site pain is abnormal and should be assessed.      5.  Look out for signs of infection in the puncture site: fever, red streaking of the leg, discharge, pain.      6.  Lack of adequate urine output, provided you are drinking enough fluids, may be cause for concern, notify us if you think this is the case.

## 2021-05-17 NOTE — DISCHARGE NOTE PROVIDER - NSDCCPCAREPLAN_GEN_ALL_CORE_FT
PRINCIPAL DISCHARGE DIAGNOSIS  Diagnosis: PAD (peripheral artery disease)  Assessment and Plan of Treatment: -You underwent a peripheral angiogram on 05/17/2021 and the blockage in your LEFT SUPERIOR FEMORAL ARTERY was opened with stent placement. You are to take ASPIRIN 81 mg daily and PLAVIX (CLOPIDROGEL) 75 mg daily. These medications work to keep your stents open.  Your procedure was done through your groin. You do not need to keep this area covered and you may shower. Please avoid any heavy lifting  (no more than 3 to 5 lbs) or strenuous activity for five days. If you develop any swelling, bleeding, hardening of the skin (hematoma formation), acute pain, numbness/tingling  in your arm or leg please contact your doctor immediately or call our 24/7 line: [phone] Please return to the hospital/seek immediate medical attention if worsening of symptoms- including not limited to chest pain, shortness of breath. Please follow up with Dr. Loza in 1-2 weeks. Please call his office and make an appointment to see him. Please continue a heart healthy diet low in sodium, cholesterol, and fat.        SECONDARY DISCHARGE DIAGNOSES  Diagnosis: Hyperlipidemia  Assessment and Plan of Treatment: Too much cholesterol in your arteries may lead to a buildup of plaque known as atherosclerosis and contribute to heart disease and peripheral artery disease. Please continue:ATORVASTATIN (LIPITOR) 40 mg once daily.  Appropriate refills were sent to your preferred pharmacy. Please follow-up with your cardiologist for further management.      Diagnosis: HTN (hypertension)  Assessment and Plan of Treatment: You have a diagnosis of Hypertension or elevated blood pressure. Please continue taking your medications as listed to keep your blood pressure controlled. In addition, there are multiple lifestyle modifications that have been proven to lower blood pressure: maintaining a healthy body weight, engaging in regular physical activity for at least 30 minutes per day on most days, and consuming a diet rich in fruits, vegetables, and low-fat dairy products with a reduced amount of total and saturated fats and sodium.   For blood pressures at home that are too high or low please see your Doctor or go to the Emergency Room as necessary.       PRINCIPAL DISCHARGE DIAGNOSIS  Diagnosis: PAD (peripheral artery disease)  Assessment and Plan of Treatment: -You underwent a peripheral angiogram on 05/17/2021 and the blockage in your LEFT SUPERIOR FEMORAL ARTERY was opened with stent placement. You are to take ASPIRIN 81 mg daily and PLAVIX (CLOPIDROGEL) 75 mg daily. These medications work to keep your stents open.  Your procedure was done through your groin. You do not need to keep this area covered and you may shower. Please avoid any heavy lifting  (no more than 3 to 5 lbs) or strenuous activity for five days. If you develop any swelling, bleeding, hardening of the skin (hematoma formation), acute pain, numbness/tingling  in your arm or leg please contact your doctor immediately or call our 24/7 line: [phone] Please return to the hospital/seek immediate medical attention if worsening of symptoms- including not limited to chest pain, shortness of breath. Please follow up with Dr. Loza in 1-2 weeks. Please call his office and make an appointment to see him. Please continue a heart healthy diet low in sodium, cholesterol, and fat.        SECONDARY DISCHARGE DIAGNOSES  Diagnosis: Hyperlipidemia  Assessment and Plan of Treatment: Too much cholesterol in your arteries may lead to a buildup of plaque known as atherosclerosis and contribute to heart disease and peripheral artery disease. Please continue:ATORVASTATIN (LIPITOR) 40 mg once daily.  Appropriate refills were sent to your preferred pharmacy. Please follow-up with your cardiologist for further management.      Diagnosis: HTN (hypertension)  Assessment and Plan of Treatment: You have a diagnosis of Hypertension or elevated blood pressure. Please continue taking your medications as listed to keep your blood pressure controlled. In addition, there are multiple lifestyle modifications that have been proven to lower blood pressure: maintaining a healthy body weight, engaging in regular physical activity for at least 30 minutes per day on most days, and consuming a diet rich in fruits, vegetables, and low-fat dairy products with a reduced amount of total and saturated fats and sodium.   For blood pressures at home that are too high or low please see your Doctor or go to the Emergency Room as necessary.      Diagnosis: Abdominal pain  Assessment and Plan of Treatment: Please follow up with your GI doctor regarding this. You were started on Pantoprazole 40mg daily, which will help prevent your stomach from irriation caused by acidity.     PRINCIPAL DISCHARGE DIAGNOSIS  Diagnosis: PAD (peripheral artery disease)  Assessment and Plan of Treatment: -You underwent a peripheral angiogram on 05/17/2021 and the blockage in your LEFT SUPERIOR FEMORAL ARTERY was opened with stent placement. You are to take ASPIRIN 81 mg daily and PLAVIX (CLOPIDROGEL) 75 mg daily. These medications work to keep your stents open.  Your procedure was done through your groin. You do not need to keep this area covered and you may shower. Please avoid any heavy lifting  (no more than 3 to 5 lbs) or strenuous activity for five days. If you develop any swelling, bleeding, hardening of the skin (hematoma formation), acute pain, numbness/tingling  in your arm or leg please contact your doctor immediately or call our 24/7 line: [phone] Please return to the hospital/seek immediate medical attention if worsening of symptoms- including not limited to chest pain, shortness of breath. Please follow up with Dr. Loza in 1-2 weeks. Please call his office and make an appointment to see him. Please continue a heart healthy diet low in sodium, cholesterol, and fat.        SECONDARY DISCHARGE DIAGNOSES  Diagnosis: Hyperlipidemia  Assessment and Plan of Treatment: Too much cholesterol in your arteries may lead to a buildup of plaque known as atherosclerosis and contribute to heart disease and peripheral artery disease. Please continue:ATORVASTATIN (LIPITOR) 40 mg once daily.  Appropriate refills were sent to your preferred pharmacy. Please follow-up with your cardiologist for further management.      Diagnosis: HTN (hypertension)  Assessment and Plan of Treatment: You have a diagnosis of Hypertension or elevated blood pressure. Please continue taking your medications as listed to keep your blood pressure controlled. In addition, there are multiple lifestyle modifications that have been proven to lower blood pressure: maintaining a healthy body weight, engaging in regular physical activity for at least 30 minutes per day on most days, and consuming a diet rich in fruits, vegetables, and low-fat dairy products with a reduced amount of total and saturated fats and sodium.   For blood pressures at home that are too high or low please see your Doctor or go to the Emergency Room as necessary.      Diagnosis: Pancreatitis  Assessment and Plan of Treatment: On admission you were found to have acute pancreatitis on CT scan/MRI. You were treated with pain medication and IV fluids. There is no active surgical need at this time.   -Please follow up with the surgical oncology team (Dr. Kaufman) within 1 month as your MRI showed some calcifications on the pancreas that are concerning for malignancy vs chronic pancreatitis. He would like to to further testing as an outpatient and speak with you on possibly removing your gallbladder in the future. His number has been provided for you to call and make an appointment.     PRINCIPAL DISCHARGE DIAGNOSIS  Diagnosis: PAD (peripheral artery disease)  Assessment and Plan of Treatment: -You underwent a peripheral angiogram on 05/17/2021 and the blockage in your LEFT SUPERIOR FEMORAL ARTERY was opened with stent placement. You are to take ASPIRIN 81 mg daily and PLAVIX (CLOPIDROGEL) 75 mg daily. These medications work to keep your stents open.  Your procedure was done through your groin. You do not need to keep this area covered and you may shower. Please avoid any heavy lifting  (no more than 3 to 5 lbs) or strenuous activity for five days. If you develop any swelling, bleeding, hardening of the skin (hematoma formation), acute pain, numbness/tingling  in your arm or leg please contact your doctor immediately or call our 24/7 line: [phone] Please return to the hospital/seek immediate medical attention if worsening of symptoms- including not limited to chest pain, shortness of breath. Please follow up with Dr. Loza in 1-2 weeks. Please call his office and make an appointment to see him. Please continue a heart healthy diet low in sodium, cholesterol, and fat.        SECONDARY DISCHARGE DIAGNOSES  Diagnosis: Hyperlipidemia  Assessment and Plan of Treatment: Too much cholesterol in your arteries may lead to a buildup of plaque known as atherosclerosis and contribute to heart disease and peripheral artery disease. Please continue:ATORVASTATIN (LIPITOR) 40 mg once daily.  Appropriate refills were sent to your preferred pharmacy. Please follow-up with your cardiologist for further management.      Diagnosis: HTN (hypertension)  Assessment and Plan of Treatment: You have a diagnosis of Hypertension or elevated blood pressure. Please continue taking your medications as listed to keep your blood pressure controlled. In addition, there are multiple lifestyle modifications that have been proven to lower blood pressure: maintaining a healthy body weight, engaging in regular physical activity for at least 30 minutes per day on most days, and consuming a diet rich in fruits, vegetables, and low-fat dairy products with a reduced amount of total and saturated fats and sodium.   For blood pressures at home that are too high or low please see your Doctor or go to the Emergency Room as necessary.      Diagnosis: Pancreatitis  Assessment and Plan of Treatment: On admission you were found to have acute pancreatitis on CT scan/MRI. You were treated with pain medication and IV fluids. There is no active surgical need at this time.   -Please follow up with the surgical oncology team (Dr. Kaufman) within 1 month as your MRI showed some calcifications on the pancreas that are concerning for malignancy vs chronic pancreatitis. He would like to to further testing as an outpatient and speak with you on possibly removing your gallbladder in the future. His number has been provided for you to call and make an appointment.  -Please continue pain management with Tylenol 650mg q 6hrs and NSAIDs only as absolutely needed as they will increase your bleeding risk in combination with ASA/Plavix. If you begin to exerience worse pain please seek immediate medical attention.     PRINCIPAL DISCHARGE DIAGNOSIS  Diagnosis: PAD (peripheral artery disease)  Assessment and Plan of Treatment: -You underwent a peripheral angiogram on 05/17/2021 and the blockage in your LEFT SUPERFICIAL FEMORAL ARTERY was opened with stent placement. You are to take ASPIRIN 81 mg daily and PLAVIX (CLOPIDROGEL) 75 mg daily. These medications work to keep your stents open.  Your procedure was done through your groin. You do not need to keep this area covered and you may shower. Please avoid any heavy lifting  (no more than 3 to 5 lbs) or strenuous activity for five days. If you develop any swelling, bleeding, hardening of the skin (hematoma formation), acute pain, numbness/tingling  in your arm or leg please contact your doctor immediately or call our 24/7 line: [phone] Please return to the hospital/seek immediate medical attention if worsening of symptoms- including not limited to chest pain, shortness of breath. Please follow up with Dr. Loza in 1-2 weeks. Please call his office and make an appointment to see him. Please continue a heart healthy diet low in sodium, cholesterol, and fat.        SECONDARY DISCHARGE DIAGNOSES  Diagnosis: Hyperlipidemia  Assessment and Plan of Treatment: Too much cholesterol in your arteries may lead to a buildup of plaque known as atherosclerosis and contribute to heart disease and peripheral artery disease. Please continue:ATORVASTATIN (LIPITOR) 40 mg once daily.  Appropriate refills were sent to your preferred pharmacy. Please follow-up with your cardiologist for further management.      Diagnosis: HTN (hypertension)  Assessment and Plan of Treatment: You have a diagnosis of Hypertension or elevated blood pressure. Please continue taking your medications as listed to keep your blood pressure controlled. In addition, there are multiple lifestyle modifications that have been proven to lower blood pressure: maintaining a healthy body weight, engaging in regular physical activity for at least 30 minutes per day on most days, and consuming a diet rich in fruits, vegetables, and low-fat dairy products with a reduced amount of total and saturated fats and sodium.   For blood pressures at home that are too high or low please see your Doctor or go to the Emergency Room as necessary.      Diagnosis: Pancreatitis  Assessment and Plan of Treatment: On admission you were found to have acute pancreatitis on CT scan/MRI. You were treated with pain medication and IV fluids. There is no active surgical need at this time.   -Please follow up with the surgical oncology team (Dr. Kaufman) within 1 month as your MRI showed some calcifications on the pancreas that are concerning for malignancy vs chronic pancreatitis. He would like to to further testing as an outpatient and speak with you on possibly removing your gallbladder in the future. His number has been provided for you to call and make an appointment.  -Please continue pain management with Tylenol 650mg q 6hrs and NSAIDs only as absolutely needed as they will increase your bleeding risk in combination with ASA/Plavix. If you begin to exerience worse pain please seek immediate medical attention.

## 2021-05-17 NOTE — DISCHARGE NOTE PROVIDER - NSDCFUADDAPPT_GEN_ALL_CORE_FT
We have provided you with a prescription for cardiac rehab which is medically supervised exercise program for your heart and has been shown to improve the quantity and quality of life of people with heart disease like yours. You should attend cardiac rehab 3 times per week for 12 weeks. We have provided you with a list of nearby facilities. Please call your insurance carrier to determine which of these facilities are covered under your plan. Please bring this prescription with you to your follow up appointment with your cardiologist who can then further assist you to enroll into a cardiac rehab program.

## 2021-05-18 DIAGNOSIS — E78.5 HYPERLIPIDEMIA, UNSPECIFIED: ICD-10-CM

## 2021-05-18 DIAGNOSIS — Z29.9 ENCOUNTER FOR PROPHYLACTIC MEASURES, UNSPECIFIED: ICD-10-CM

## 2021-05-18 DIAGNOSIS — I73.9 PERIPHERAL VASCULAR DISEASE, UNSPECIFIED: ICD-10-CM

## 2021-05-18 DIAGNOSIS — R10.9 UNSPECIFIED ABDOMINAL PAIN: ICD-10-CM

## 2021-05-18 DIAGNOSIS — I10 ESSENTIAL (PRIMARY) HYPERTENSION: ICD-10-CM

## 2021-05-18 LAB
ALBUMIN SERPL ELPH-MCNC: 3.9 G/DL — SIGNIFICANT CHANGE UP (ref 3.3–5)
ALP SERPL-CCNC: 120 U/L — SIGNIFICANT CHANGE UP (ref 40–120)
ALT FLD-CCNC: 15 U/L — SIGNIFICANT CHANGE UP (ref 10–45)
ANION GAP SERPL CALC-SCNC: 11 MMOL/L — SIGNIFICANT CHANGE UP (ref 5–17)
AST SERPL-CCNC: 21 U/L — SIGNIFICANT CHANGE UP (ref 10–40)
BASOPHILS # BLD AUTO: 0.02 K/UL — SIGNIFICANT CHANGE UP (ref 0–0.2)
BASOPHILS NFR BLD AUTO: 0.2 % — SIGNIFICANT CHANGE UP (ref 0–2)
BILIRUB DIRECT SERPL-MCNC: <0.2 MG/DL — SIGNIFICANT CHANGE UP (ref 0–0.2)
BILIRUB INDIRECT FLD-MCNC: SIGNIFICANT CHANGE UP MG/DL (ref 0.2–1)
BILIRUB SERPL-MCNC: 0.7 MG/DL — SIGNIFICANT CHANGE UP (ref 0.2–1.2)
BUN SERPL-MCNC: 16 MG/DL — SIGNIFICANT CHANGE UP (ref 7–23)
CALCIUM SERPL-MCNC: 9.3 MG/DL — SIGNIFICANT CHANGE UP (ref 8.4–10.5)
CHLORIDE SERPL-SCNC: 95 MMOL/L — LOW (ref 96–108)
CO2 SERPL-SCNC: 25 MMOL/L — SIGNIFICANT CHANGE UP (ref 22–31)
COVID-19 SPIKE DOMAIN AB INTERP: NEGATIVE — SIGNIFICANT CHANGE UP
COVID-19 SPIKE DOMAIN ANTIBODY RESULT: 0.4 U/ML — SIGNIFICANT CHANGE UP
CREAT SERPL-MCNC: 1.48 MG/DL — HIGH (ref 0.5–1.3)
EOSINOPHIL # BLD AUTO: 0.21 K/UL — SIGNIFICANT CHANGE UP (ref 0–0.5)
EOSINOPHIL NFR BLD AUTO: 2.2 % — SIGNIFICANT CHANGE UP (ref 0–6)
GLUCOSE SERPL-MCNC: 102 MG/DL — HIGH (ref 70–99)
HCT VFR BLD CALC: 34.1 % — LOW (ref 39–50)
HGB BLD-MCNC: 12.1 G/DL — LOW (ref 13–17)
IMM GRANULOCYTES NFR BLD AUTO: 0.4 % — SIGNIFICANT CHANGE UP (ref 0–1.5)
LYMPHOCYTES # BLD AUTO: 1.63 K/UL — SIGNIFICANT CHANGE UP (ref 1–3.3)
LYMPHOCYTES # BLD AUTO: 17.3 % — SIGNIFICANT CHANGE UP (ref 13–44)
MAGNESIUM SERPL-MCNC: 2 MG/DL — SIGNIFICANT CHANGE UP (ref 1.6–2.6)
MCHC RBC-ENTMCNC: 31.3 PG — SIGNIFICANT CHANGE UP (ref 27–34)
MCHC RBC-ENTMCNC: 35.5 GM/DL — SIGNIFICANT CHANGE UP (ref 32–36)
MCV RBC AUTO: 88.3 FL — SIGNIFICANT CHANGE UP (ref 80–100)
MONOCYTES # BLD AUTO: 0.76 K/UL — SIGNIFICANT CHANGE UP (ref 0–0.9)
MONOCYTES NFR BLD AUTO: 8.1 % — SIGNIFICANT CHANGE UP (ref 2–14)
NEUTROPHILS # BLD AUTO: 6.77 K/UL — SIGNIFICANT CHANGE UP (ref 1.8–7.4)
NEUTROPHILS NFR BLD AUTO: 71.8 % — SIGNIFICANT CHANGE UP (ref 43–77)
NRBC # BLD: 0 /100 WBCS — SIGNIFICANT CHANGE UP (ref 0–0)
PLATELET # BLD AUTO: 259 K/UL — SIGNIFICANT CHANGE UP (ref 150–400)
POTASSIUM SERPL-MCNC: 3.9 MMOL/L — SIGNIFICANT CHANGE UP (ref 3.5–5.3)
POTASSIUM SERPL-SCNC: 3.9 MMOL/L — SIGNIFICANT CHANGE UP (ref 3.5–5.3)
PROT SERPL-MCNC: 7 G/DL — SIGNIFICANT CHANGE UP (ref 6–8.3)
RBC # BLD: 3.86 M/UL — LOW (ref 4.2–5.8)
RBC # FLD: 13.5 % — SIGNIFICANT CHANGE UP (ref 10.3–14.5)
SARS-COV-2 IGG+IGM SERPL QL IA: 0.4 U/ML — SIGNIFICANT CHANGE UP
SARS-COV-2 IGG+IGM SERPL QL IA: NEGATIVE — SIGNIFICANT CHANGE UP
SODIUM SERPL-SCNC: 131 MMOL/L — LOW (ref 135–145)
WBC # BLD: 9.43 K/UL — SIGNIFICANT CHANGE UP (ref 3.8–10.5)
WBC # FLD AUTO: 9.43 K/UL — SIGNIFICANT CHANGE UP (ref 3.8–10.5)

## 2021-05-18 PROCEDURE — 74177 CT ABD & PELVIS W/CONTRAST: CPT | Mod: 26

## 2021-05-18 PROCEDURE — 99223 1ST HOSP IP/OBS HIGH 75: CPT

## 2021-05-18 PROCEDURE — 93010 ELECTROCARDIOGRAM REPORT: CPT

## 2021-05-18 PROCEDURE — 74019 RADEX ABDOMEN 2 VIEWS: CPT | Mod: 26

## 2021-05-18 RX ORDER — ASPIRIN/CALCIUM CARB/MAGNESIUM 324 MG
1 TABLET ORAL
Qty: 30 | Refills: 11
Start: 2021-05-18 | End: 2022-05-12

## 2021-05-18 RX ORDER — IOHEXOL 300 MG/ML
30 INJECTION, SOLUTION INTRAVENOUS ONCE
Refills: 0 | Status: COMPLETED | OUTPATIENT
Start: 2021-05-18 | End: 2021-05-18

## 2021-05-18 RX ORDER — CLOPIDOGREL BISULFATE 75 MG/1
1 TABLET, FILM COATED ORAL
Qty: 30 | Refills: 11
Start: 2021-05-18 | End: 2022-05-12

## 2021-05-18 RX ORDER — PANTOPRAZOLE SODIUM 20 MG/1
40 TABLET, DELAYED RELEASE ORAL
Refills: 0 | Status: DISCONTINUED | OUTPATIENT
Start: 2021-05-18 | End: 2021-05-20

## 2021-05-18 RX ORDER — PANTOPRAZOLE SODIUM 20 MG/1
1 TABLET, DELAYED RELEASE ORAL
Qty: 30 | Refills: 2
Start: 2021-05-18 | End: 2021-08-15

## 2021-05-18 RX ORDER — ACETAMINOPHEN 500 MG
650 TABLET ORAL ONCE
Refills: 0 | Status: COMPLETED | OUTPATIENT
Start: 2021-05-18 | End: 2021-05-18

## 2021-05-18 RX ORDER — FAMOTIDINE 10 MG/ML
1 INJECTION INTRAVENOUS
Qty: 0 | Refills: 0 | DISCHARGE

## 2021-05-18 RX ORDER — ONDANSETRON 8 MG/1
4 TABLET, FILM COATED ORAL ONCE
Refills: 0 | Status: COMPLETED | OUTPATIENT
Start: 2021-05-18 | End: 2021-05-18

## 2021-05-18 RX ORDER — SODIUM CHLORIDE 9 MG/ML
500 INJECTION INTRAMUSCULAR; INTRAVENOUS; SUBCUTANEOUS
Refills: 0 | Status: DISCONTINUED | OUTPATIENT
Start: 2021-05-18 | End: 2021-05-18

## 2021-05-18 RX ORDER — SODIUM CHLORIDE 9 MG/ML
500 INJECTION INTRAMUSCULAR; INTRAVENOUS; SUBCUTANEOUS
Refills: 0 | Status: DISCONTINUED | OUTPATIENT
Start: 2021-05-18 | End: 2021-05-20

## 2021-05-18 RX ORDER — ACETAMINOPHEN 500 MG
650 TABLET ORAL EVERY 6 HOURS
Refills: 0 | Status: DISCONTINUED | OUTPATIENT
Start: 2021-05-18 | End: 2021-05-20

## 2021-05-18 RX ADMIN — Medication 81 MILLIGRAM(S): at 11:59

## 2021-05-18 RX ADMIN — Medication 30 MILLILITER(S): at 08:07

## 2021-05-18 RX ADMIN — Medication 650 MILLIGRAM(S): at 02:58

## 2021-05-18 RX ADMIN — CLOPIDOGREL BISULFATE 75 MILLIGRAM(S): 75 TABLET, FILM COATED ORAL at 11:53

## 2021-05-18 RX ADMIN — IOHEXOL 30 MILLILITER(S): 300 INJECTION, SOLUTION INTRAVENOUS at 15:07

## 2021-05-18 RX ADMIN — ONDANSETRON 4 MILLIGRAM(S): 8 TABLET, FILM COATED ORAL at 07:17

## 2021-05-18 RX ADMIN — CARVEDILOL PHOSPHATE 12.5 MILLIGRAM(S): 80 CAPSULE, EXTENDED RELEASE ORAL at 11:58

## 2021-05-18 RX ADMIN — ATORVASTATIN CALCIUM 40 MILLIGRAM(S): 80 TABLET, FILM COATED ORAL at 21:18

## 2021-05-18 RX ADMIN — CARVEDILOL PHOSPHATE 12.5 MILLIGRAM(S): 80 CAPSULE, EXTENDED RELEASE ORAL at 21:18

## 2021-05-18 RX ADMIN — SODIUM CHLORIDE 50 MILLILITER(S): 9 INJECTION INTRAMUSCULAR; INTRAVENOUS; SUBCUTANEOUS at 14:31

## 2021-05-18 RX ADMIN — Medication 650 MILLIGRAM(S): at 23:00

## 2021-05-18 RX ADMIN — Medication 650 MILLIGRAM(S): at 03:58

## 2021-05-18 RX ADMIN — FAMOTIDINE 40 MILLIGRAM(S): 10 INJECTION INTRAVENOUS at 11:57

## 2021-05-18 RX ADMIN — PANTOPRAZOLE SODIUM 40 MILLIGRAM(S): 20 TABLET, DELAYED RELEASE ORAL at 11:59

## 2021-05-18 RX ADMIN — Medication 25 MILLIGRAM(S): at 11:58

## 2021-05-18 NOTE — PROGRESS NOTE ADULT - PROBLEM SELECTOR PLAN 2
Pt awoke 5/18 w/ continously belching followed by reported severe epigastric pain and multiple episodes of small amounts of non-blood bilious vomiting.  - Abd exam: BS+, non-mobile soft tender non-reducible approx 1.5x1.5" lump in epigastric region, pt states has been present for at least the past few months  - s/p sigmoidectomy 11/23/2020 @[facility]  - since sigmoidectomy pt has been followed by outpt Dr. Park? pt reports poor f/u 2/2 "all he wants to do is phone interviews"  - c/w home Famotidine 40mg QD  - s/p zofran 4mg IVP x1 and Maalox pt reports modest relief of abd pain/vomiting  - Surgery consulted: Believe epigastric lump is likely a lipoma. Rec: Abdominal xray, start PPI, abd CT, and possible GI consult  - Started Pantoprazole 40mg QD  - Abd xray 5/18/21: Mildly distended loops of small bowel. Gas is seen in nondistended colon to the rectosigmoid region. Abbreviated differential includes a small bowel ileus versus a low-grade and/or early small bowel obstruction.  - Abd CT ordered, f/u results Pt awoke 5/18 w/ continously belching followed by reported severe epigastric pain and multiple episodes of small amounts of non-blood bilious vomiting.  - Abd exam: BS+, non-mobile soft tender non-reducible approx 1.5x1.5" lump in epigastric region, pt states has been present for at least the past few months  - s/p sigmoidectomy 11/23/2020 @[facility]  - since sigmoidectomy pt has been followed by outpt Dr. Park? pt reports poor f/u 2/2 "all he wants to do is phone interviews"  - c/w home Famotidine 40mg QD  - s/p zofran 4mg IVP x1 and Maalox pt reports modest relief of abd pain/vomiting  - Surgery consulted: Believe epigastric lump is likely a lipoma. Rec: Abdominal xray, LFT's start PPI, abd CT, and possible GI consult  - Started Pantoprazole 40mg QD  - LFT's WNL  - Abd xray 5/18/21: Mildly distended loops of small bowel. Gas is seen in nondistended colon to the rectosigmoid region. Abbreviated differential includes a small bowel ileus versus a low-grade and/or early small bowel obstruction.  - Abd CT ordered, f/u results

## 2021-05-18 NOTE — PROVIDER CONTACT NOTE (OTHER) - BACKGROUND
54 y/o male current smoker s/p peripheral cath Atherectomy/PTCA/ SES x 2 L SFA. Hx Colonic adenomatous lesion s/p Sigmoidectomy

## 2021-05-18 NOTE — PROGRESS NOTE ADULT - PROBLEM SELECTOR PLAN 1
S/p peripheral cath 5/17/21: Atherectomy/PTCA/ SES x 2 L SFA  prox to distal. Findings: B/L iliacs patent, B/L CFA patent. Access: R groin PC  - c/w ASA 81mg QD and Plavix 75mg QD

## 2021-05-18 NOTE — CHART NOTE - NSCHARTNOTEFT_GEN_A_CORE
General Surgery Team had a conversation with the Provider Team regarding the diagnosis and the course of treatment. CT A/P w/ PO and IV contrast demonstrated findings concerning for acute pancreatitis and also a sludge in the gallbladder.    Strict Is&Os were recommended as well as adequate hydration that is acceptable with the underlying heart disease. It was also recommended to keep the patient in the monitored settings.    Team 4C will provide FLAKITO Q6 hours.

## 2021-05-18 NOTE — CHART NOTE - NSCHARTNOTEFT_GEN_A_CORE
senior surgery resident note:    patient seen and examined with nausea and abdominal distention. patient also has been sweating while in hospital       on exam, diaphoretic  abdomen distended, tender in all quadrants, but not peritonitic, voluntary guarding      CT consistent with acute pancreatitis.   possible sludge in gallbladder    patient states he only drinks occasionally, does smoke 4 cigs a day for many years, trying to cut down.     given comorbidities, do recommend patient is in a setting where he is monitored and strict I&O are done to ensure adequate resuscitation.   surgery team will discuss this with the medicine team.

## 2021-05-18 NOTE — CONSULT NOTE ADULT - SUBJECTIVE AND OBJECTIVE BOX
General Surgery Consult Note   Reason: sudden severe epigastric pain with NBNB vomit.   Team 4c surgery     SUBJECTIVE: Patient seen and examined bedside.     PMH: Smoker (for 30 years, 4 cig/day), HTN, HF (EF 40-45%), CAD, Colonic adenomatous lesion.     PSH: Robotic Assisted Sigmoidectomy in November 2020      aspirin enteric coated 81 milliGRAM(s) Oral daily  carvedilol 12.5 milliGRAM(s) Oral every 12 hours  chlorthalidone 25 milliGRAM(s) Oral daily  clopidogrel Tablet 75 milliGRAM(s) Oral daily  losartan 50 milliGRAM(s) Oral daily      Vital Signs Last 24 Hrs  T(C): 37.2 (18 May 2021 06:54), Max: 37.2 (18 May 2021 06:54)  T(F): 98.9 (18 May 2021 06:54), Max: 98.9 (18 May 2021 06:54)  HR: 72 (18 May 2021 10:05) (70 - 84)  BP: 149/91 (18 May 2021 10:05) (136/90 - 168/112)  BP(mean): 107 (18 May 2021 05:42) (105 - 135)  RR: 19 (18 May 2021 10:05) (17 - 19)  SpO2: 97% (18 May 2021 10:05) (97% - 99%)  I&O's Detail    17 May 2021 07:01  -  18 May 2021 07:00  --------------------------------------------------------  IN:    Oral Fluid: 180 mL    sodium chloride 0.9%: 825 mL  Total IN: 1005 mL    OUT:    Voided (mL): 900 mL  Total OUT: 900 mL    Total NET: 105 mL          PHYSICAL EXAMINATION   General: NAD  NEURO:  follows commands.   PULM: nonlabored breathing, no respiratory distress  ABD: soft, nondistended, appropriately tender, no rebound, no guarding, no tympany. Incisions CDI and without hematoma or erythema    EXTREM: WWP, no edema, no calf tenderness  VASC: No cyanosis,  no pallor.   PSYCH: Appropriate affect, answers questions appropriately      LABS:                        12.1   9.43  )-----------( 259      ( 18 May 2021 07:10 )             34.1     05-18    131<L>  |  95<L>  |  16  ----------------------------<  102<H>  3.9   |  25  |  1.48<H>    Ca    9.3      18 May 2021 07:09  Mg     2.0     05-18    TPro  7.6  /  Alb  4.6  /  TBili  0.7  /  DBili  x   /  AST  27  /  ALT  18  /  AlkPhos  141<H>  05-17    PT/INR - ( 17 May 2021 16:03 )   PT: 11.2 sec;   INR: 0.93          PTT - ( 17 May 2021 16:03 )  PTT:47.9 sec      General Surgery Consult Note   Reason: sudden severe epigastric pain with NBNB vomit.   Team 4c surgery     SUBJECTIVE: Patient seen and examined bedside. Patient c/o sudden onset severe abdominal pain, 10/10, in the epigastric area, radiating to the back and entire abdomen, constant, sharp, associated with nausea, vomit NBNB, mostly clear saliva, about 5cc each time. Pt stated he has 3 episodes of vomiting but states he feels he is not bringing anything up completely. Constant burping. Tolerating clears without pain, vomit, or worsening of the symptoms. Pain improves after burping, worse with movement and touch. Last BM was 3 days ago, was normal in color, no blood present, formed stool. Last flatus yesterday. Denies fever, chills, regurgitation, dysphagia, weight loss, bloody BM, hematemesis. Patient does report that the pain has been present since after the colon resection in November 2020. But was never this severe. Had previous workup done with CT scan but showed no intraabdominal pathologies. Last c-scope was January 2021, which per patient report was normal. Last EGD was a year ago, since then he has being taking famotidine for acid reflux.     PMH: Smoker (for 30 years, 4 cig/day), HTN, HF (EF 40-45%), CAD, Colonic adenomatous lesion, GERD     PSH: Robotic Assisted Sigmoidectomy in November 2020      aspirin enteric coated 81 milliGRAM(s) Oral daily  carvedilol 12.5 milliGRAM(s) Oral every 12 hours  chlorthalidone 25 milliGRAM(s) Oral daily  clopidogrel Tablet 75 milliGRAM(s) Oral daily  losartan 50 milliGRAM(s) Oral daily      Vital Signs Last 24 Hrs  T(C): 37.2 (18 May 2021 06:54), Max: 37.2 (18 May 2021 06:54)  T(F): 98.9 (18 May 2021 06:54), Max: 98.9 (18 May 2021 06:54)  HR: 72 (18 May 2021 10:05) (70 - 84)  BP: 149/91 (18 May 2021 10:05) (136/90 - 168/112)  BP(mean): 107 (18 May 2021 05:42) (105 - 135)  RR: 19 (18 May 2021 10:05) (17 - 19)  SpO2: 97% (18 May 2021 10:05) (97% - 99%)  I&O's Detail    17 May 2021 07:01  -  18 May 2021 07:00  --------------------------------------------------------  IN:    Oral Fluid: 180 mL    sodium chloride 0.9%: 825 mL  Total IN: 1005 mL    OUT:    Voided (mL): 900 mL  Total OUT: 900 mL    Total NET: 105 mL          PHYSICAL EXAMINATION   General: moaning in pain in hospital bed, holding the abdomen.   NEURO: follows commands  PULM: nonlabored breathing, no respiratory distress  ABD: tense due to strong abdominal musculature, nondistended, tender with deep palpation diffusely, no rebound, no guarding, no tympany.  Epigastric nodulation that is soft, mobile, rubbery with fat consistency, no overlying skin changes.    EXTREM: WWP, no edema, no calf tenderness        LABS:                        12.1   9.43  )-----------( 259      ( 18 May 2021 07:10 )             34.1     05-18    131<L>  |  95<L>  |  16  ----------------------------<  102<H>  3.9   |  25  |  1.48<H>    Ca    9.3      18 May 2021 07:09  Mg     2.0     05-18    TPro  7.6  /  Alb  4.6  /  TBili  0.7  /  DBili  x   /  AST  27  /  ALT  18  /  AlkPhos  141<H>  05-17    PT/INR - ( 17 May 2021 16:03 )   PT: 11.2 sec;   INR: 0.93          PTT - ( 17 May 2021 16:03 )  PTT:47.9 sec

## 2021-05-18 NOTE — PROGRESS NOTE ADULT - SUBJECTIVE AND OBJECTIVE BOX
Interventional Cardiology PA Adult Progress Note    Subjective Assessment:    Pt seen and assessed at bedside. Pt noted to be continuously belching and reporting new-onset epigastric pain with multiple episodes of vomiting small amounts of non-bloody, bilious vomit. Pt reports these symptoms are new for him.  12 point ROS negative other than what specified.  	  MEDICATIONS:  carvedilol 12.5 milliGRAM(s) Oral every 12 hours  chlorthalidone 25 milliGRAM(s) Oral daily  losartan 50 milliGRAM(s) Oral daily  famotidine    Tablet 40 milliGRAM(s) Oral daily  pantoprazole    Tablet 40 milliGRAM(s) Oral before breakfast  atorvastatin 40 milliGRAM(s) Oral at bedtime  aspirin enteric coated 81 milliGRAM(s) Oral daily  chlorhexidine 4% Liquid 1 Application(s) Topical once  clopidogrel Tablet 75 milliGRAM(s) Oral daily  sodium chloride 0.9%. 500 milliLiter(s) IV Continuous <Continuous>  sodium chloride 0.9%. 500 milliLiter(s) IV Continuous <Continuous>  	    [PHYSICAL EXAM:  TELEMETRY:  T(C): 36.1 (05-18-21 @ 13:16), Max: 37.2 (05-18-21 @ 06:54)  HR: 85 (05-18-21 @ 13:30) (70 - 85)  BP: 123/81 (05-18-21 @ 13:30) (123/81 - 168/112)  RR: 17 (05-18-21 @ 13:30) (17 - 19)  SpO2: 97% (05-18-21 @ 13:30) (97% - 99%)  Wt(kg): --  I&O's Summary    17 May 2021 07:01  -  18 May 2021 07:00  --------------------------------------------------------  IN: 1005 mL / OUT: 900 mL / NET: 105 mL    18 May 2021 07:01  -  18 May 2021 16:58  --------------------------------------------------------  IN: 0 mL / OUT: 0 mL / NET: 0 mL        [place]:  Central/PICC/Mid Line:                                         Appearance: Normal	  HEENT:   Normal oral mucosa, PERRL, EOMI	  Neck: Supple, - JVD; no carotid Bruit   Cardiovascular: Normal S1 S2, No JVD, No murmurs,   Respiratory: Lungs clear to auscultation, No Rales, Rhonchi, or Wheezing	  Gastrointestinal:  + bowel sounds, small soft tender to palpation non-reducible lump in epigastric region pt states has been present for months, and generalized abdominal pain w/ guarding w/o distention.  Skin: No rashes, No ecchymoses, No cyanosis  Extremities: Normal range of motion, No clubbing, cyanosis or edema  Vascular: Peripheral pulses L DP and PT 1+, R DP and PT dopplerable. R groin access site stable w/ dressing C/D/I  Neurologic: Non-focal  Psychiatry: A & O x 3, Mood & affect appropriate      	    ECG:  	  RADIOLOGY:   DIAGNOSTIC TESTING:  [ ] Echocardiogram:  [ ]  Catheterization:  [ ] Stress Test:    [ ] MALGORZATA  OTHER: 	    LABS:	 	  CARDIAC MARKERS:                          12.1   9.43  )-----------( 259      ( 18 May 2021 07:10 )             34.1     05-18    131<L>  |  95<L>  |  16  ----------------------------<  102<H>  3.9   |  25  |  1.48<H>    Ca    9.3      18 May 2021 07:09  Mg     2.0     05-18    TPro  7.0  /  Alb  3.9  /  TBili  0.7  /  DBili  <0.2  /  AST  21  /  ALT  15  /  AlkPhos  120  05-18    proBNP:   Lipid Profile:   HgA1c:   TSH:   PT/INR - ( 17 May 2021 16:03 )   PT: 11.2 sec;   INR: 0.93          PTT - ( 17 May 2021 16:03 )  PTT:47.9 sec

## 2021-05-18 NOTE — PROGRESS NOTE ADULT - ASSESSMENT
Patient is a 54yo Male, current smoker, with PMHx of HTN, HFmEF (EF 40-45% by echo 9/2020), known CAD s/p PCI HARJINDER mRCA on 5/10/2021 at [facility] , Colonic adenomatous lesion s/p Sigmoidectomy 11/23/20, who presented to his cardiologist, Dr. Loza for follow-up and has been having weakness and a "rubbery feeling" in his legs while walking 4-5 blocks.   In light of patient's risk factors, [place] Class III symptoms, and abnormal CTA, patient presents for peripheral angiogram with intervention if clinically indicated. Hospital course c/b abdominal pain and vomiting 5/18.

## 2021-05-18 NOTE — PROGRESS NOTE ADULT - PROBLEM SELECTOR PLAN 3
SBP range this admission 123-168  -c/w home Coreg 12.5mg BID, Chlorthalidone 25mg QD, Losartan 50mg QD

## 2021-05-18 NOTE — PROGRESS NOTE ADULT - PROBLEM SELECTOR PLAN 4
Cardiac Cath 10/26/20: positive FFR 0.7 of mRCA, LM normal, LAD mild luminal, Ramus small vessel mild diffuse, mLCx 30%, dLCx 40%, OM1 normal.  - LDL 85 this admission  - c/w Atorvastatin 40mg QHS

## 2021-05-19 DIAGNOSIS — I50.20 UNSPECIFIED SYSTOLIC (CONGESTIVE) HEART FAILURE: ICD-10-CM

## 2021-05-19 DIAGNOSIS — K85.90 ACUTE PANCREATITIS WITHOUT NECROSIS OR INFECTION, UNSPECIFIED: ICD-10-CM

## 2021-05-19 LAB
ALBUMIN SERPL ELPH-MCNC: 3.6 G/DL — SIGNIFICANT CHANGE UP (ref 3.3–5)
ALP SERPL-CCNC: 114 U/L — SIGNIFICANT CHANGE UP (ref 40–120)
ALT FLD-CCNC: 12 U/L — SIGNIFICANT CHANGE UP (ref 10–45)
AMYLASE P1 CFR SERPL: 1139 U/L — HIGH (ref 25–125)
ANION GAP SERPL CALC-SCNC: 13 MMOL/L — SIGNIFICANT CHANGE UP (ref 5–17)
AST SERPL-CCNC: 15 U/L — SIGNIFICANT CHANGE UP (ref 10–40)
BASOPHILS # BLD AUTO: 0.01 K/UL — SIGNIFICANT CHANGE UP (ref 0–0.2)
BASOPHILS NFR BLD AUTO: 0.1 % — SIGNIFICANT CHANGE UP (ref 0–2)
BILIRUB SERPL-MCNC: 0.8 MG/DL — SIGNIFICANT CHANGE UP (ref 0.2–1.2)
BUN SERPL-MCNC: 14 MG/DL — SIGNIFICANT CHANGE UP (ref 7–23)
CALCIUM SERPL-MCNC: 9.2 MG/DL — SIGNIFICANT CHANGE UP (ref 8.4–10.5)
CHLORIDE SERPL-SCNC: 96 MMOL/L — SIGNIFICANT CHANGE UP (ref 96–108)
CO2 SERPL-SCNC: 24 MMOL/L — SIGNIFICANT CHANGE UP (ref 22–31)
CREAT SERPL-MCNC: 1.34 MG/DL — HIGH (ref 0.5–1.3)
EOSINOPHIL # BLD AUTO: 0.14 K/UL — SIGNIFICANT CHANGE UP (ref 0–0.5)
EOSINOPHIL NFR BLD AUTO: 1.4 % — SIGNIFICANT CHANGE UP (ref 0–6)
GLUCOSE SERPL-MCNC: 94 MG/DL — SIGNIFICANT CHANGE UP (ref 70–99)
HCT VFR BLD CALC: 36.8 % — LOW (ref 39–50)
HGB BLD-MCNC: 12.8 G/DL — LOW (ref 13–17)
IMM GRANULOCYTES NFR BLD AUTO: 0.5 % — SIGNIFICANT CHANGE UP (ref 0–1.5)
LIDOCAIN IGE QN: 1875 U/L — HIGH (ref 7–60)
LYMPHOCYTES # BLD AUTO: 1.12 K/UL — SIGNIFICANT CHANGE UP (ref 1–3.3)
LYMPHOCYTES # BLD AUTO: 10.9 % — LOW (ref 13–44)
MAGNESIUM SERPL-MCNC: 1.9 MG/DL — SIGNIFICANT CHANGE UP (ref 1.6–2.6)
MCHC RBC-ENTMCNC: 30.5 PG — SIGNIFICANT CHANGE UP (ref 27–34)
MCHC RBC-ENTMCNC: 34.8 GM/DL — SIGNIFICANT CHANGE UP (ref 32–36)
MCV RBC AUTO: 87.8 FL — SIGNIFICANT CHANGE UP (ref 80–100)
MONOCYTES # BLD AUTO: 0.81 K/UL — SIGNIFICANT CHANGE UP (ref 0–0.9)
MONOCYTES NFR BLD AUTO: 7.9 % — SIGNIFICANT CHANGE UP (ref 2–14)
NEUTROPHILS # BLD AUTO: 8.15 K/UL — HIGH (ref 1.8–7.4)
NEUTROPHILS NFR BLD AUTO: 79.2 % — HIGH (ref 43–77)
NRBC # BLD: 0 /100 WBCS — SIGNIFICANT CHANGE UP (ref 0–0)
PLATELET # BLD AUTO: 259 K/UL — SIGNIFICANT CHANGE UP (ref 150–400)
POTASSIUM SERPL-MCNC: 3.6 MMOL/L — SIGNIFICANT CHANGE UP (ref 3.5–5.3)
POTASSIUM SERPL-SCNC: 3.6 MMOL/L — SIGNIFICANT CHANGE UP (ref 3.5–5.3)
PROT SERPL-MCNC: 6.7 G/DL — SIGNIFICANT CHANGE UP (ref 6–8.3)
RBC # BLD: 4.19 M/UL — LOW (ref 4.2–5.8)
RBC # FLD: 13.3 % — SIGNIFICANT CHANGE UP (ref 10.3–14.5)
SODIUM SERPL-SCNC: 133 MMOL/L — LOW (ref 135–145)
WBC # BLD: 10.28 K/UL — SIGNIFICANT CHANGE UP (ref 3.8–10.5)
WBC # FLD AUTO: 10.28 K/UL — SIGNIFICANT CHANGE UP (ref 3.8–10.5)

## 2021-05-19 PROCEDURE — 74182 MRI ABDOMEN W/CONTRAST: CPT | Mod: 26

## 2021-05-19 RX ORDER — MAGNESIUM OXIDE 400 MG ORAL TABLET 241.3 MG
400 TABLET ORAL ONCE
Refills: 0 | Status: COMPLETED | OUTPATIENT
Start: 2021-05-19 | End: 2021-05-19

## 2021-05-19 RX ORDER — POTASSIUM CHLORIDE 20 MEQ
40 PACKET (EA) ORAL ONCE
Refills: 0 | Status: COMPLETED | OUTPATIENT
Start: 2021-05-19 | End: 2021-05-19

## 2021-05-19 RX ADMIN — MAGNESIUM OXIDE 400 MG ORAL TABLET 400 MILLIGRAM(S): 241.3 TABLET ORAL at 11:31

## 2021-05-19 RX ADMIN — LOSARTAN POTASSIUM 50 MILLIGRAM(S): 100 TABLET, FILM COATED ORAL at 06:28

## 2021-05-19 RX ADMIN — CARVEDILOL PHOSPHATE 12.5 MILLIGRAM(S): 80 CAPSULE, EXTENDED RELEASE ORAL at 11:31

## 2021-05-19 RX ADMIN — CLOPIDOGREL BISULFATE 75 MILLIGRAM(S): 75 TABLET, FILM COATED ORAL at 11:31

## 2021-05-19 RX ADMIN — SODIUM CHLORIDE 50 MILLILITER(S): 9 INJECTION INTRAMUSCULAR; INTRAVENOUS; SUBCUTANEOUS at 00:06

## 2021-05-19 RX ADMIN — CARVEDILOL PHOSPHATE 12.5 MILLIGRAM(S): 80 CAPSULE, EXTENDED RELEASE ORAL at 23:59

## 2021-05-19 RX ADMIN — Medication 40 MILLIEQUIVALENT(S): at 11:31

## 2021-05-19 RX ADMIN — Medication 25 MILLIGRAM(S): at 11:31

## 2021-05-19 RX ADMIN — Medication 81 MILLIGRAM(S): at 11:31

## 2021-05-19 RX ADMIN — ATORVASTATIN CALCIUM 40 MILLIGRAM(S): 80 TABLET, FILM COATED ORAL at 23:59

## 2021-05-19 RX ADMIN — PANTOPRAZOLE SODIUM 40 MILLIGRAM(S): 20 TABLET, DELAYED RELEASE ORAL at 06:28

## 2021-05-19 RX ADMIN — FAMOTIDINE 40 MILLIGRAM(S): 10 INJECTION INTRAVENOUS at 11:31

## 2021-05-19 NOTE — PROGRESS NOTE ADULT - ASSESSMENT
Patient is a 54yo Male, current smoker, with PMHx of HTN, HFmEF (EF 40-45% by echo 9/2020), known CAD s/p PCI HARJINDER mRCA on 5/10/2021 at [facility] , Colonic adenomatous lesion s/p Sigmoidectomy 11/23/20, who presented to his cardiologist, Dr. Loza for follow-up and has been having weakness and a "rubbery feeling" in his legs while walking 4-5 blocks.   In light of patient's risk factors, [place] Class III symptoms, and abnormal CTA, patient presents for peripheral angiogram with intervention if clinically indicated. Hospital course c/b abdominal pain and vomiting 5/18. 56yo Male, current smoker, with PMHx of HTN, systolic CHF (EF 40-45% by echo 9/2020), known CAD s/p PCI HARJINDER mRCA on 5/10/2021 at [facility] , Colonic adenomatous lesion s/p Sigmoidectomy 11/23/20, who presented to his cardiologist, Dr. Loza for follow-up and has been having weakness and a "rubbery feeling" in his legs while walking 4-5 blocks. Pt presented  peripheral angiogram with intervention if clinically indicated. Hospital course c/b abdominal pain and vomiting 5/18. 56yo Male, current smoker, with PMHx of HTN, systolic CHF (EF 40-45% by echo 9/2020), known CAD s/p PCI HARJINDER mRCA on 5/10/2021 at [facility] , Colonic adenomatous lesion s/p Sigmoidectomy 11/23/20, who presented to his cardiologist, Dr. Loza for follow-up and has been having weakness and a "rubbery feeling" in his legs while walking 4-5 blocks. Pt presented peripheral angiogram and s/p artherectomy/PTCA/HARJINDER x2 L SFA  prox to distal. Hospital course c/b abdominal pain and vomiting 5/18 for which discharge delayed. Surgery consulted for questionable pancreatitis however no plan for surgical intervention. GI consulted and rec blood work and MRI Pancreatitis protocal prior to d/c. Discharge pending workup.

## 2021-05-19 NOTE — PROGRESS NOTE ADULT - PROBLEM SELECTOR PLAN 1
S/p peripheral cath 5/17/21: Atherectomy/PTCA/ SES x 2 L SFA  prox to distal. Findings: B/L iliacs patent, B/L CFA patent. Access: R groin PC  - c/w ASA 81mg QD and Plavix 75mg QD Pt awoke 5/18 w/ continuously belching followed by reported severe epigastric pain and multiple episodes of small amounts of non-blood bilious vomiting. Now improved.  - Abd exam: BS+, non-mobile soft tender non-reducible approx 1.5x1.5" lump in epigastric region (chronic), LUQ pain to palpation,  - Amalyze 1875, Lipase 1139 (elevated), LFTs WNL  - Abd xray 5/18/21: Mildly distended loops of small bowel. Gas is seen in nondistended colon to the rectosigmoid region. Abbreviated differential includes a small bowel ileus versus a low-grade and/or early small bowel obstruction  -CT A/P 5/18/21: Mild ascites with the dominant portion of fluid seen around and engorged pancreas, Calcifications in the pancreatic head/uncinate process, compatible with chronic pancreatitis  - Surgery consulted: Believe epigastric lump is likely a lipoma, no surgical intervention, rec GI consult  -GI recs: Igg4 levels, Trypsinogen, NPO for MRI abdomen pancreatic protocol (ordered), f/u results  -CONT: famotidine 40mg QD, MiraLax PRN for gas/pain Pt awoke 5/18 w/ continuously belching followed by reported severe epigastric pain and multiple episodes of small amounts of non-blood bilious vomiting. Now improved.  - Abd exam: BS+, non-mobile soft tender non-reducible approx 1.5x1.5" lump in epigastric region (chronic), LUQ pain to palpation,  - Amalyze 1875, Lipase 1139 (elevated), LFTs WNL  - Abd xray 5/18/21: Mildly distended loops of small bowel. Gas is seen in nondistended colon to the rectosigmoid region. Abbreviated differential includes a small bowel ileus versus a low-grade and/or early small bowel obstruction  -CT A/P 5/18/21: Mild ascites with the dominant portion of fluid seen around and engorged pancreas, Calcifications in the pancreatic head/uncinate process, compatible with chronic pancreatitis  - Surgery consulted: Believe epigastric lump is likely a lipoma, no surgical intervention, rec GI consult  -GI recs: Igg4 levels, Trypsinogen, MRI abdomen pancreatic protocol (ordered - verified doesn't need NPO), f/u results  -CONT: famotidine 40mg QD, MiraLax PRN for gas/pain

## 2021-05-19 NOTE — PROGRESS NOTE ADULT - PROBLEM SELECTOR PLAN 2
Pt awoke 5/18 w/ continously belching followed by reported severe epigastric pain and multiple episodes of small amounts of non-blood bilious vomiting.  - Abd exam: BS+, non-mobile soft tender non-reducible approx 1.5x1.5" lump in epigastric region, pt states has been present for at least the past few months  - s/p [facility] 11/23/2020 [facility]  - since sigmoidectomy pt has been followed by outpt Dr. Park? pt reports poor f/u 2/2 "all he wants to do is phone interviews"  - c/w home Famotidine 40mg QD  - s/p zofran 4mg IVP x1 and Maalox pt reports modest relief of abd pain/vomiting  - Surgery consulted: Believe epigastric lump is likely a lipoma. Rec: Abdominal xray, LFT's start PPI, abd CT, and possible GI consult  - Started Pantoprazole 40mg QD  - LFT's WNL  - Abd xray 5/18/21: Mildly distended loops of small bowel. Gas is seen in nondistended colon to the rectosigmoid region. Abbreviated differential includes a small bowel ileus versus a low-grade and/or early small bowel obstruction.  - Abd CT ordered, f/u results S/p peripheral cath 5/17/21: Atherectomy/PTCA/ SES x 2 L SFA  prox to distal. Findings: B/L iliacs patent, B/L CFA patent. Access: R groin PC  - c/w ASA 81mg QD and Plavix 75mg QD

## 2021-05-19 NOTE — PROGRESS NOTE ADULT - NUTRITIONAL ASSESSMENT
This is a 55 years old male with PMH of tobacco use (for 30 years, 4 cig/day), HTN, HF (EF 40-45%), CAD, Colonic adenomatous lesion, GERD. PSH: Robotic Assisted Sigmoidectomy in November 2020.  Admitted by cardiology for LE arterial disease, S/p angiogram and angioplasty. Presenting with  sudden severe epigastric pain with NBNB vomit. His CT scan showed acute interstitial pancreatitis. Patient has mild improvement today    - No acute surgical intervention at this point  - We recommend adequate pain control  - IVF per primary team  - Consider GI consult for further evaluation  - Rest of care per primary team  - Team 4C will continue to follow

## 2021-05-19 NOTE — PROGRESS NOTE ADULT - SUBJECTIVE AND OBJECTIVE BOX
SUBJECTIVE: Patient seen and examined bedside by resident. CT scan was done overnight and was read as acute interstitial pancreatitis. This am, he reports that his pain is slightly better, and his LFTs are normal. No gallstones were seen in the CT scan, but some sludge was seen.    aspirin enteric coated 81 milliGRAM(s) Oral daily  carvedilol 12.5 milliGRAM(s) Oral every 12 hours  chlorthalidone 25 milliGRAM(s) Oral daily  clopidogrel Tablet 75 milliGRAM(s) Oral daily  losartan 50 milliGRAM(s) Oral daily      Vital Signs Last 24 Hrs  T(C): 36.9 (19 May 2021 09:34), Max: 37.3 (18 May 2021 19:01)  T(F): 98.4 (19 May 2021 09:34), Max: 99.2 (18 May 2021 19:01)  HR: 97 (19 May 2021 08:38) (82 - 100)  BP: 143/83 (19 May 2021 08:38) (121/96 - 143/83)  BP(mean): 106 (19 May 2021 08:38) (100 - 106)  RR: 18 (19 May 2021 08:38) (17 - 18)  SpO2: 98% (19 May 2021 08:38) (97% - 98%)  I&O's Detail    18 May 2021 07:01  -  19 May 2021 07:00  --------------------------------------------------------  IN:    Oral Fluid: 1060 mL    sodium chloride 0.9%: 250 mL    sodium chloride 0.9%: 200 mL  Total IN: 1510 mL    OUT:    Voided (mL): 200 mL  Total OUT: 200 mL    Total NET: 1310 mL        PHYSICAL EXAMINATION   General: in mild distress due to pain, improved compared to yesterday   NEURO: follows commands, no focal deficits  PULM: nonlabored breathing, no respiratory distress  ABD: nondistended, tender with deep palpation diffusely, no rebound, some voluntary guarding, no tympany.  Epigastric nodulation that is soft, mobile, rubbery with fat consistency, no overlying skin changes.    EXTREM: WWP, no edema, no calf tenderness        LABS:                        12.8   10.28 )-----------( 259      ( 19 May 2021 06:59 )             36.8         133<L>  |  96  |  14  ----------------------------<  94  3.6   |  24  |  1.34<H>    Ca    9.2      19 May 2021 06:59  Mg     1.9         TPro  6.7  /  Alb  3.6  /  TBili  0.8  /  DBili  x   /  AST  15  /  ALT  12  /  AlkPhos  114      PT/INR - ( 17 May 2021 16:03 )   PT: 11.2 sec;   INR: 0.93          PTT - ( 17 May 2021 16:03 )  PTT:47.9 sec      RADIOLOGY & ADDITIONAL STUDIES:    EXAM: CT ABDOMEN AND PELVIS OC IC    PROCEDURE DATE: 2021        INTERPRETATION: ATTENDING RADIOLOGIST ADDENDUM: AGREE WITH THE BELOW REPORT WITH THE FOLLOWING ADDENDUM:    Clustered calcifications with possible associated cystic/solid soft-tissue in the pancreaticoduodenal groove (5:25), stable from  and may represent chronic groove pancreatitis or underlying lesion. Diffuse pancreatic edema without necrosis. Homogeneous peripancreatic fluid extending into the mesentery and right upper quadrant consistent with acute interstitial pancreatitis. After cessation of acute symptoms follow-up contrast enhanced MRI pancreas is recommended.    Small ascites. Status post sigmoid resection with unremarkable sigmoid anastomosis. No evidence of bowel obstruction. Mild upstream colonic distention large amount of stool in the colon.  ================================================    [facility]  Preliminary Radiology Report  Call: [phone]  assistance Online chat: [email or web address]  Patient Name: JAEL LYN MRN: [id number]   (Age): 1965 55 Gender: M  Date of Exam: 2021 Accession: [id number]  Referring Physician: Ania Ley # of Images: [id number]  Ordered As: CT ABDOMEN/PELVIS W  PROCEDURE INFORMATION:  Exam: CT Abdomen And Pelvis With Contrast  Exam date and time: 2021 9:05 PM  Age: 55 years old  Clinical indication: Abdominal pain; Prior surgery; Surgery type: Status post sigmoidectomy.  TECHNIQUE:  Imaging protocol: Computed tomography of the abdomen and pelvis with contrast. 90 cc of Optiray 350 was administered intravenously. 10 cc was discarded.  COMPARISON:  No relevant prior studies available.  FINDINGS:  Lungs: No concerning findings in the lung bases.  Liver: Normal. No mass.  Gallbladder and bile ducts: Sludge is seen dependently in the gallbladder.  Pancreas: Fluid predominantly surrounds a thickened and engorged pancreas. Cluster of  calcifications is seen about the pancreatic head and uncinate process, suggesting chronic  pancreatitis. In this region, there is ill-defined hypodensity, measuring approximately 3.5 x 1.7 cm,  seen on image 47 of series 3 and image 26 of series 5.  Spleen: Normal. No splenomegaly.  Adrenal glands: No mass.  Kidneys and ureters: Normal. No hydronephrosis.  Stomach and bowel: Postsurgical changes are seen from partial sigmoid resection.  Appendix: No evidence of appendicitis.  Intraperitoneal space: There is a mild amount of ascites.  Vasculature: No abdominal aortic aneurysm.  Lymph nodes: No lymphadenopathy.  Urinary bladder: Unremarkable as visualized.  Reproductive: Unremarkable as visualized.  Bones/joints: No acute osseous abnormality.  Soft tissues: Unremarkable.    IMPRESSION:  1. Mild ascites with the dominant portion of fluid seen around and engorged pancreas. Please  correlate for acute pancreatitis.  2. Focus of hypoattenuation in the pancreatic head which may be related to necrosis. Neoplasm is  not excluded.  3. Calcifications in the pancreatic head/uncinate process, compatible with chronic pancreatitis.  Thank you for allowing us to participate in the care of your patient.  Dictated and Authenticated by: Bryce Bush MD  2021 10:21 PM Eastern Time ([place] & [place])    The above report was submitted by the [facility] attending radiologist and copied to PowerScribe by resident Dr. Vasques.          Thank you for the opportunity to participate in the care of this patient.    SHERRY VASQUES M.D., RADIOLOGY RESIDENT  This document has been electronically signed.  JODIE GAY MD; Attending Radiologist  This document has been electronically signed. May 19 2021 6:11AM

## 2021-05-19 NOTE — PROGRESS NOTE ADULT - PROBLEM SELECTOR PLAN 4
Cardiac Cath 10/26/20: positive FFR 0.7 of mRCA, LM normal, LAD mild luminal, Ramus small vessel mild diffuse, mLCx 30%, dLCx 40%, OM1 normal.  - LDL 85 this admission  - c/w Atorvastatin 40mg QHS SBP range this admission 123-168  -c/w home Coreg 12.5mg BID, Chlorthalidone 25mg QD, Losartan 50mg QD

## 2021-05-19 NOTE — PROGRESS NOTE ADULT - SUBJECTIVE AND OBJECTIVE BOX
Interventional Cardiology PA Adult Progress Note    Subjective Assessment:  	  MEDICATIONS:  carvedilol 12.5 milliGRAM(s) Oral every 12 hours  chlorthalidone 25 milliGRAM(s) Oral daily  losartan 50 milliGRAM(s) Oral daily        acetaminophen   Tablet .. 650 milliGRAM(s) Oral every 6 hours PRN    famotidine    Tablet 40 milliGRAM(s) Oral daily  pantoprazole    Tablet 40 milliGRAM(s) Oral before breakfast    atorvastatin 40 milliGRAM(s) Oral at bedtime    aspirin enteric coated 81 milliGRAM(s) Oral daily  chlorhexidine 4% Liquid 1 Application(s) Topical once  clopidogrel Tablet 75 milliGRAM(s) Oral daily  sodium chloride 0.9%. 500 milliLiter(s) IV Continuous <Continuous>  sodium chloride 0.9%. 500 milliLiter(s) IV Continuous <Continuous>      	    [PHYSICAL EXAM:  TELEMETRY:  T(C): 36.9 (05-19-21 @ 09:34), Max: 37.3 (05-18-21 @ 19:01)  HR: 97 (05-19-21 @ 08:38) (82 - 100)  BP: 143/83 (05-19-21 @ 08:38) (121/96 - 143/83)  RR: 18 (05-19-21 @ 08:38) (17 - 18)  SpO2: 98% (05-19-21 @ 08:38) (97% - 98%)  Wt(kg): --  I&O's Summary    18 May 2021 07:01  -  19 May 2021 07:00  --------------------------------------------------------  IN: 1510 mL / OUT: 200 mL / NET: 1310 mL        Saldana:  Central/PICC/Mid Line:                                         Appearance: Normal	  HEENT:   Normal oral mucosa, PERRL, EOMI	  Neck: Supple, + JVD/ - JVD; Carotid Bruit   Cardiovascular: Normal S1 S2, No JVD, No murmurs,   Respiratory: Lungs clear to auscultation/Decreased Breath Sounds/No Rales, Rhonchi, Wheezing	  Gastrointestinal:  Soft, Non-tender, + BS	  Skin: No rashes, No ecchymoses, No cyanosis  Extremities: Normal range of motion, No clubbing, cyanosis or edema  Vascular: Peripheral pulses palpable 2+ bilaterally  Neurologic: Non-focal  Psychiatry: A & O x 3, Mood & affect appropriate      	    ECG:  	  RADIOLOGY:   DIAGNOSTIC TESTING:  [ ] Echocardiogram:  [ ]  Catheterization:  [ ] Stress Test:    [ ] MALGORZATA  OTHER: 	    LABS:	 	  CARDIAC MARKERS:                                  12.8   10.28 )-----------( 259      ( 19 May 2021 06:59 )             36.8     05-19    133<L>  |  96  |  14  ----------------------------<  94  3.6   |  24  |  1.34<H>    Ca    9.2      19 May 2021 06:59  Mg     1.9     05-19    TPro  6.7  /  Alb  3.6  /  TBili  0.8  /  DBili  x   /  AST  15  /  ALT  12  /  AlkPhos  114  05-19    proBNP:   Lipid Profile:   HgA1c:   TSH:   PT/INR - ( 17 May 2021 16:03 )   PT: 11.2 sec;   INR: 0.93          PTT - ( 17 May 2021 16:03 )  PTT:47.9 sec    ASSESSMENT/PLAN: 	        DVT ppx:  Dispo:     Interventional Cardiology PA Adult Progress Note    Subjective Assessment: Patient seen and examined at the bedside. States belly pain has improved but is still there. In agreement to get workup for questionable pancreatitis. No N/V/D. All other ROS negative except those listed in subjective assessment.  	  MEDICATIONS:  carvedilol 12.5 milliGRAM(s) Oral every 12 hours  chlorthalidone 25 milliGRAM(s) Oral daily  losartan 50 milliGRAM(s) Oral daily  acetaminophen   Tablet .. 650 milliGRAM(s) Oral every 6 hours PRN  famotidine    Tablet 40 milliGRAM(s) Oral daily  pantoprazole    Tablet 40 milliGRAM(s) Oral before breakfast  atorvastatin 40 milliGRAM(s) Oral at bedtime  aspirin enteric coated 81 milliGRAM(s) Oral daily  chlorhexidine 4% Liquid 1 Application(s) Topical once  clopidogrel Tablet 75 milliGRAM(s) Oral daily  sodium chloride 0.9%. 500 milliLiter(s) IV Continuous <Continuous>  sodium chloride 0.9%. 500 milliLiter(s) IV Continuous <Continuous>	    [PHYSICAL EXAM:  TELEMETRY:  T(C): 36.9 (05-19-21 @ 09:34), Max: 37.3 (05-18-21 @ 19:01)  HR: 97 (05-19-21 @ 08:38) (82 - 100)  BP: 143/83 (05-19-21 @ 08:38) (121/96 - 143/83)  RR: 18 (05-19-21 @ 08:38) (17 - 18)  SpO2: 98% (05-19-21 @ 08:38) (97% - 98%  I&O's Summary    18 May 2021 07:01  -  19 May 2021 07:00  --------------------------------------------------------  IN: 1510 mL / OUT: 200 mL / NET: 1310 mL    Appearance: Normal	  Neck: Supple, - JVD; no Carotid Bruit b/l  Cardiovascular: Normal S1 S2, No murmurs, rubs, gallops  Respiratory: Lungs clear to auscultation/No Rales, Rhonchi, Wheezing	  Gastrointestinal:  Soft, mild distention, tenderness to light palpation of LUQ, rebound tenderness, no guarding or rigidity, + BS x4. (+) small lipoma on epigastric area -- non tender	  Skin: (-) [place] or grey lopez signs  Extremities: Normal range of motion, No clubbing, cyanosis or edema  Vascular: Peripheral pulses palpable 2+ bilaterally carotids, radial, femoral, +1 DP/PT  Neurologic: A & O x 3, Mood & affect appropriate      	    ECG:  	  RADIOLOGY:   DIAGNOSTIC TESTING:  [ ] Echocardiogram:  [ ]  Catheterization:  [ ] Stress Test:    [ ] MALGORZATA  OTHER: 	    LABS:	 	  CARDIAC MARKERS:                       12.8   10.28 )-----------( 259      ( 19 May 2021 06:59 )             36.8     05-19    133<L>  |  96  |  14  ----------------------------<  94  3.6   |  24  |  1.34<H>    Ca    9.2      19 May 2021 06:59  Mg     1.9     05-19    TPro  6.7  /  Alb  3.6  /  TBili  0.8  /  DBili  x   /  AST  15  /  ALT  12  /  AlkPhos  114  05-19  PT/INR - ( 17 May 2021 16:03 )   PT: 11.2 sec;   INR: 0.93     PTT - ( 17 May 2021 16:03 )  PTT:47.9 sec

## 2021-05-19 NOTE — PROGRESS NOTE ADULT - PROBLEM SELECTOR PLAN 5
DVT ppx: None, pt ambulatory  Dispo: Discharge pending resolution of abd pain    Case d/w Dr. Plaza Cardiac Cath 10/26/20: positive FFR 0.7 of mRCA, LM normal, LAD mild luminal, Ramus small vessel mild diffuse, mLCx 30%, dLCx 40%, OM1 normal.  - c/w Atorvastatin 40mg QHS

## 2021-05-19 NOTE — CHART NOTE - NSCHARTNOTEFT_GEN_A_CORE
SERIAL ABDOMINAL EXAM    The patient was examined at the bedside. He states that he feels much better from the last time he was seen. He denies having any nausea or vomiting. The patient does not have any chest pain, SOB, dizziness, chills.    Gen: NAD, resting comfortably in bed  Pulm: Good inspiratory effort, nonlabored breathing  C/V: NSR  Abd: Soft, moderately distended, no rebound tenderness, guarding, rigidity, mild diffuse TTP  Extrem: WWP, no edema   Neuro: AAOx3

## 2021-05-19 NOTE — PROGRESS NOTE ADULT - PROBLEM SELECTOR PLAN 3
SBP range this admission 123-168  -c/w home Coreg 12.5mg BID, Chlorthalidone 25mg QD, Losartan 50mg QD -EF 40-45% per echo -- currently euvolemic  -c/w home Coreg 12.5mg BID, Chlorthalidone 25mg QD, Losartan 50mg QD

## 2021-05-20 ENCOUNTER — TRANSCRIPTION ENCOUNTER (OUTPATIENT)
Age: 56
End: 2021-05-20

## 2021-05-20 VITALS — TEMPERATURE: 98 F

## 2021-05-20 DIAGNOSIS — Z98.890 OTHER SPECIFIED POSTPROCEDURAL STATES: ICD-10-CM

## 2021-05-20 DIAGNOSIS — I70.92 CHRONIC TOTAL OCCLUSION OF ARTERY OF THE EXTREMITIES: ICD-10-CM

## 2021-05-20 DIAGNOSIS — I70.209 UNSPECIFIED ATHEROSCLEROSIS OF NATIVE ARTERIES OF EXTREMITIES, UNSPECIFIED EXTREMITY: ICD-10-CM

## 2021-05-20 DIAGNOSIS — I25.119 ATHEROSCLEROTIC HEART DISEASE OF NATIVE CORONARY ARTERY WITH UNSPECIFIED ANGINA PECTORIS: ICD-10-CM

## 2021-05-20 DIAGNOSIS — F17.210 NICOTINE DEPENDENCE, CIGARETTES, UNCOMPLICATED: ICD-10-CM

## 2021-05-20 DIAGNOSIS — Z79.82 LONG TERM (CURRENT) USE OF ASPIRIN: ICD-10-CM

## 2021-05-20 DIAGNOSIS — I12.9 HYPERTENSIVE CHRONIC KIDNEY DISEASE WITH STAGE 1 THROUGH STAGE 4 CHRONIC KIDNEY DISEASE, OR UNSPECIFIED CHRONIC KIDNEY DISEASE: ICD-10-CM

## 2021-05-20 DIAGNOSIS — N18.30 CHRONIC KIDNEY DISEASE, STAGE 3 UNSPECIFIED: ICD-10-CM

## 2021-05-20 DIAGNOSIS — I25.10 ATHEROSCLEROTIC HEART DISEASE OF NATIVE CORONARY ARTERY WITHOUT ANGINA PECTORIS: ICD-10-CM

## 2021-05-20 DIAGNOSIS — Z79.899 OTHER LONG TERM (CURRENT) DRUG THERAPY: ICD-10-CM

## 2021-05-20 LAB
ANION GAP SERPL CALC-SCNC: 11 MMOL/L — SIGNIFICANT CHANGE UP (ref 5–17)
BUN SERPL-MCNC: 16 MG/DL — SIGNIFICANT CHANGE UP (ref 7–23)
CALCIUM SERPL-MCNC: 9.3 MG/DL — SIGNIFICANT CHANGE UP (ref 8.4–10.5)
CHLORIDE SERPL-SCNC: 96 MMOL/L — SIGNIFICANT CHANGE UP (ref 96–108)
CO2 SERPL-SCNC: 25 MMOL/L — SIGNIFICANT CHANGE UP (ref 22–31)
CREAT SERPL-MCNC: 1.36 MG/DL — HIGH (ref 0.5–1.3)
GLUCOSE SERPL-MCNC: 93 MG/DL — SIGNIFICANT CHANGE UP (ref 70–99)
HCT VFR BLD CALC: 32.4 % — LOW (ref 39–50)
HGB BLD-MCNC: 11.1 G/DL — LOW (ref 13–17)
MAGNESIUM SERPL-MCNC: 2 MG/DL — SIGNIFICANT CHANGE UP (ref 1.6–2.6)
MCHC RBC-ENTMCNC: 30.3 PG — SIGNIFICANT CHANGE UP (ref 27–34)
MCHC RBC-ENTMCNC: 34.3 GM/DL — SIGNIFICANT CHANGE UP (ref 32–36)
MCV RBC AUTO: 88.5 FL — SIGNIFICANT CHANGE UP (ref 80–100)
NRBC # BLD: 0 /100 WBCS — SIGNIFICANT CHANGE UP (ref 0–0)
PLATELET # BLD AUTO: 231 K/UL — SIGNIFICANT CHANGE UP (ref 150–400)
POTASSIUM SERPL-MCNC: 3.5 MMOL/L — SIGNIFICANT CHANGE UP (ref 3.5–5.3)
POTASSIUM SERPL-SCNC: 3.5 MMOL/L — SIGNIFICANT CHANGE UP (ref 3.5–5.3)
RBC # BLD: 3.66 M/UL — LOW (ref 4.2–5.8)
RBC # FLD: 13.6 % — SIGNIFICANT CHANGE UP (ref 10.3–14.5)
SODIUM SERPL-SCNC: 132 MMOL/L — LOW (ref 135–145)
WBC # BLD: 10.07 K/UL — SIGNIFICANT CHANGE UP (ref 3.8–10.5)
WBC # FLD AUTO: 10.07 K/UL — SIGNIFICANT CHANGE UP (ref 3.8–10.5)

## 2021-05-20 PROCEDURE — 99223 1ST HOSP IP/OBS HIGH 75: CPT

## 2021-05-20 PROCEDURE — 99221 1ST HOSP IP/OBS SF/LOW 40: CPT

## 2021-05-20 PROCEDURE — 99222 1ST HOSP IP/OBS MODERATE 55: CPT

## 2021-05-20 RX ORDER — SODIUM CHLORIDE 9 MG/ML
500 INJECTION INTRAMUSCULAR; INTRAVENOUS; SUBCUTANEOUS
Refills: 0 | Status: DISCONTINUED | OUTPATIENT
Start: 2021-05-20 | End: 2021-05-20

## 2021-05-20 RX ORDER — POTASSIUM CHLORIDE 20 MEQ
20 PACKET (EA) ORAL ONCE
Refills: 0 | Status: COMPLETED | OUTPATIENT
Start: 2021-05-20 | End: 2021-05-20

## 2021-05-20 RX ORDER — POTASSIUM CHLORIDE 20 MEQ
40 PACKET (EA) ORAL ONCE
Refills: 0 | Status: COMPLETED | OUTPATIENT
Start: 2021-05-20 | End: 2021-05-20

## 2021-05-20 RX ORDER — OXYCODONE AND ACETAMINOPHEN 5; 325 MG/1; MG/1
1 TABLET ORAL ONCE
Refills: 0 | Status: DISCONTINUED | OUTPATIENT
Start: 2021-05-20 | End: 2021-05-20

## 2021-05-20 RX ORDER — ACETAMINOPHEN 500 MG
2 TABLET ORAL
Qty: 0 | Refills: 0 | DISCHARGE
Start: 2021-05-20

## 2021-05-20 RX ORDER — OXYCODONE AND ACETAMINOPHEN 5; 325 MG/1; MG/1
1 TABLET ORAL EVERY 4 HOURS
Refills: 0 | Status: DISCONTINUED | OUTPATIENT
Start: 2021-05-20 | End: 2021-05-20

## 2021-05-20 RX ADMIN — Medication 25 MILLIGRAM(S): at 10:43

## 2021-05-20 RX ADMIN — Medication 20 MILLIEQUIVALENT(S): at 10:41

## 2021-05-20 RX ADMIN — Medication 40 MILLIEQUIVALENT(S): at 10:41

## 2021-05-20 RX ADMIN — SODIUM CHLORIDE 50 MILLILITER(S): 9 INJECTION INTRAMUSCULAR; INTRAVENOUS; SUBCUTANEOUS at 11:18

## 2021-05-20 RX ADMIN — PANTOPRAZOLE SODIUM 40 MILLIGRAM(S): 20 TABLET, DELAYED RELEASE ORAL at 05:09

## 2021-05-20 RX ADMIN — CARVEDILOL PHOSPHATE 12.5 MILLIGRAM(S): 80 CAPSULE, EXTENDED RELEASE ORAL at 10:41

## 2021-05-20 RX ADMIN — OXYCODONE AND ACETAMINOPHEN 1 TABLET(S): 5; 325 TABLET ORAL at 11:41

## 2021-05-20 RX ADMIN — LOSARTAN POTASSIUM 50 MILLIGRAM(S): 100 TABLET, FILM COATED ORAL at 05:09

## 2021-05-20 RX ADMIN — Medication 650 MILLIGRAM(S): at 06:06

## 2021-05-20 RX ADMIN — FAMOTIDINE 40 MILLIGRAM(S): 10 INJECTION INTRAVENOUS at 10:42

## 2021-05-20 RX ADMIN — Medication 650 MILLIGRAM(S): at 05:09

## 2021-05-20 RX ADMIN — Medication 81 MILLIGRAM(S): at 10:42

## 2021-05-20 RX ADMIN — CLOPIDOGREL BISULFATE 75 MILLIGRAM(S): 75 TABLET, FILM COATED ORAL at 10:42

## 2021-05-20 RX ADMIN — OXYCODONE AND ACETAMINOPHEN 1 TABLET(S): 5; 325 TABLET ORAL at 10:41

## 2021-05-20 RX ADMIN — OXYCODONE AND ACETAMINOPHEN 1 TABLET(S): 5; 325 TABLET ORAL at 17:41

## 2021-05-20 NOTE — PROGRESS NOTE ADULT - PROBLEM SELECTOR PLAN 6
DVT ppx: None, pt ambulatory  Dispo: Discharge pending MRI results    Case d/w Dr. Plaza
DVT ppx: None, pt ambulatory  Dispo: Discharge pending MRI results    Case d/w Dr. Plaza

## 2021-05-20 NOTE — PROGRESS NOTE ADULT - PROBLEM SELECTOR PLAN 3
-EF 40-45% per echo -- currently euvolemic  -c/w home Coreg 12.5mg BID, Chlorthalidone 25mg QD, Losartan 50mg QD

## 2021-05-20 NOTE — PROGRESS NOTE ADULT - SUBJECTIVE AND OBJECTIVE BOX
Interventional Cardiology PA Adult Progress Note    Subjective Assessment:  	  MEDICATIONS:  carvedilol 12.5 milliGRAM(s) Oral every 12 hours  chlorthalidone 25 milliGRAM(s) Oral daily  losartan 50 milliGRAM(s) Oral daily        acetaminophen   Tablet .. 650 milliGRAM(s) Oral every 6 hours PRN  oxycodone    5 mG/acetaminophen 325 mG 1 Tablet(s) Oral every 4 hours PRN    famotidine    Tablet 40 milliGRAM(s) Oral daily  pantoprazole    Tablet 40 milliGRAM(s) Oral before breakfast    atorvastatin 40 milliGRAM(s) Oral at bedtime    aspirin enteric coated 81 milliGRAM(s) Oral daily  chlorhexidine 4% Liquid 1 Application(s) Topical once  clopidogrel Tablet 75 milliGRAM(s) Oral daily  sodium chloride 0.9%. 500 milliLiter(s) IV Continuous <Continuous>  sodium chloride 0.9%. 500 milliLiter(s) IV Continuous <Continuous>  sodium chloride 0.9%. 500 milliLiter(s) IV Continuous <Continuous>      	    [PHYSICAL EXAM:  TELEMETRY:  T(C): 37 (05-20-21 @ 09:11), Max: 37 (05-20-21 @ 09:11)  HR: 84 (05-20-21 @ 08:57) (83 - 105)  BP: 114/74 (05-20-21 @ 08:57) (113/54 - 132/90)  RR: 16 (05-20-21 @ 08:57) (16 - 18)  SpO2: 98% (05-20-21 @ 08:57) (96% - 98%)  Wt(kg): --  I&O's Summary    20 May 2021 07:01  -  20 May 2021 12:59  --------------------------------------------------------  IN: 180 mL / OUT: 0 mL / NET: 180 mL        Saldana:  Central/PICC/Mid Line:                                         Appearance: Normal	  HEENT:   Normal oral mucosa, PERRL, EOMI	  Neck: Supple, + JVD/ - JVD; Carotid Bruit   Cardiovascular: Normal S1 S2, No JVD, No murmurs,   Respiratory: Lungs clear to auscultation/Decreased Breath Sounds/No Rales, Rhonchi, Wheezing	  Gastrointestinal:  Soft, Non-tender, + BS	  Skin: No rashes, No ecchymoses, No cyanosis  Extremities: Normal range of motion, No clubbing, cyanosis or edema  Vascular: Peripheral pulses palpable 2+ bilaterally  Neurologic: Non-focal  Psychiatry: A & O x 3, Mood & affect appropriate      	    ECG:  	  RADIOLOGY:   DIAGNOSTIC TESTING:  [ ] Echocardiogram:  [ ]  Catheterization:  [ ] Stress Test:    [ ] MALGORZATA  OTHER: 	    LABS:	 	  CARDIAC MARKERS:                                  11.1   10.07 )-----------( 231      ( 20 May 2021 07:48 )             32.4     05-20    132<L>  |  96  |  16  ----------------------------<  93  3.5   |  25  |  1.36<H>    Ca    9.3      20 May 2021 07:48  Mg     2.0     05-20    TPro  6.7  /  Alb  3.6  /  TBili  0.8  /  DBili  x   /  AST  15  /  ALT  12  /  AlkPhos  114  05-19    proBNP:   Lipid Profile:   HgA1c:   TSH:       ASSESSMENT/PLAN: 	        DVT ppx:  Dispo:

## 2021-05-20 NOTE — CONSULT NOTE ADULT - ASSESSMENT
56yo Male, current smoker, with PMHx of HTN, systolic CHF (EF 40-45% by echo 9/2020), CAD (s/p PCI HARJINDER mRCA on 5/10/2021 at [facility]), colonic adenomatous lesion s/p sigmoidectomy 11/23/20, who presented to [facility] 5/17 for peripheral angiogram and artherectomy/ percutaneous transluminal coronary angioplasty/ TCA/ HARJINDER x2 L SFA  prox to distal on 5/17. Hospital course was c/b acute pancreatitis.    #Pancreatitis  Pt with acute onset n/v, abdominal pain on 5/18. Imaging, pain distribution and lipase >3x ULN consistent with acute pancreatitis. MR abdomen showed irregular focus of hypoenhancement in the head of the pancreas where there are pancreatic calcifications, correlated with ERCP to r/o neoplasm. Patient was managed with IVF and pain meds. Now tolerating PO intake and pain much improved.  - encourage PO intake  - pain management her primary team  - surgery was following, no surgical intervention at this time  - surg/onc following, appreciate recs for w/u and f/u  - GI consulted, rec for cholecystectomy for GB sludge seen on imaging, f/u outpt with gen surg    #S/p staged peripheral stent  - c/w asa, plavix and atorvastatin  - management per primary team    #Hyponatremia  Na persistently 132-133 since admission. Likely 2/2 NPO status and poor PO intake with pancreatitis.  - monitor BMP  - have pt f/u with his PCP outpatient to repeat BMP    #CKD  Patient appears to have baseline Cr 1.3.  Stable.    #Anemia  Likely AOCI given multiple different procedures. Also some component of hemodilution since he's been on IVF for a few days. No signs of active bleeding.  - monitor CBC    #CAD  - c/w asa, plavix, atorvastatin, coreg, losartan  - management per primary team    #HTN  Normotensive.  - c/w losartan and chlorthalidone    Plan discussed with attending, Dr. Turner.   Medicine will not accept this patient onto our service at this time given pancreatitis appears to be much improved with reduced pain and pt tolerating Po intake
55 years old male with PMH of tobacco use (for 30 years, 4 cig/day), HTN, HF (EF 40-45%), CAD, Colonic adenomatous lesion, GERD. PSH: Robotic Assisted Sigmoidectomy in November 2020.  Admitted by cardiology for LE arterial disease, S/p angiogram and angioplasty. Presenting with sudden severe epigastric pain with NBNB vomit. Imaging consistent with Acute pancreatitis and calcifications in the head for the pancreas and Stable hypodensity. Lipase elevated. GB sludge, no biiliary dilation. Normal LTs    # Acute pancreatitis  - likely gallstone pancreatitis  - supportive care: IVF, pain control  - Cholecystectomy per GS  - repeat MRI abdomen in 1 year    Recommendations discussed with primary team  Plan discussed with GI service attending    Sandeep Mendoza MD  PGY-4 GI fellow  Pager: [phone]    
This is a 55 years old male with PMH of tobacco use (for 30 years, 4 cig/day), HTN, HF (EF 40-45%), CAD, Colonic adenomatous lesion, GERD. PSH: Robotic Assisted Sigmoidectomy in November 2020.  Admitted by cardiology for LE arterial disease, S/p angiogram and angioplasty. Presenting with  sudden severe epigastric pain with NBNB vomit.     Reason: sudden severe epigastric pain with NBNB vomit.     Suggested approach:  1- 2 views abdominal xray - STAT  2- Add LFT to am labs  3- Started PPI - BID  4- GI consult   Due to unusual presentation of the patient, we recommend the initial approaches above to help elucidate the reason for the presenting symptoms. We discussed 5 possible diagnosis: PUD, perforated PUD, acute cholecystitis, or partial large or small bowel obstruction. Please page us if any worsening of the clinical status and/or when results are available.   Team 4c surgery (Acute Care Surgery) following

## 2021-05-20 NOTE — CONSULT NOTE ADULT - SUBJECTIVE AND OBJECTIVE BOX
HPI:  Patient is a 56yo Male, current smoker, with PMHx of HTN, HFmEF (EF 40-45% by echo 2020), known CAD s/p PCI HARJINDER mRCA on 5/10/2021 at [facility] , Colonic adenomatous lesion s/p Sigmoidectomy 20, who presented to his cardiologist, Dr. Loza for follow-up and has been having weakness and a "rubbery feeling" in his legs while walking 4-5 blocks. Patient denies CP, SOB, dizziness, diaphoresis, LE edema, orthopnea, palpitations, PND, fatigue, n/v/d, syncope, non-healing ulcers, numbness/tingling, color changes, fever, chills, or sick contacts.   CTA Aorta w/ LE Runoff (21) revealing occluded R distal SFA, occluded L SFA, ectatic R common illiac artery and L internal iliac artery, nonvisualization of distal R anterior Tibial (probably occluded). Cardiac Cath 10/26/20: positive FFR 0.7 of mRCA, LM normal, LAD mild luminal, Ramus small vessel mild diffuse, mLCx 30%, dLCx 40%, OM1 normal.  Echocardiogram 2020 revealing LVEF 40-45%, moderate concentric LVH, Grade 1 Diastolic Dysfunction, resting regional wall motion abnormalities of the inferoseptal wall.  In light of patient's risk factors, [place] Class III symptoms, and abnormal CTA, patient presents for peripheral angiogram with intervention if clinically indicated.     SURGICAL ONCOLOGY ADDENDUM  Patient underwent peripheral angiogram with atherectomy/PTCA/HARJINDER. A day after the procedure he developed severe abdominal pain and GS was consulted. CT scan showed acute pancreatitis and MRI the following day showed again acute pancreatitis with questionable hypoattenuating lesion of the head of the pancreas, for which Surgical Oncology is consulted. Patient had a workup a few months ago for a "colon mass" that was removed (per patient it was benign lesion and didn't require colonoscopy) and at that time, there was a concern for "something in the pancreas" but they told him that it was eventually all right.      PAST MEDICAL & SURGICAL HISTORY:  HTN (hypertension)    Colonic mass    Ingrown toenail    Smoker    S/P laparoscopic-assisted sigmoidectomy        MEDICATIONS  (STANDING):  aspirin enteric coated 81 milliGRAM(s) Oral daily  atorvastatin 40 milliGRAM(s) Oral at bedtime  carvedilol 12.5 milliGRAM(s) Oral every 12 hours  chlorhexidine 4% Liquid 1 Application(s) Topical once  chlorthalidone 25 milliGRAM(s) Oral daily  clopidogrel Tablet 75 milliGRAM(s) Oral daily  famotidine    Tablet 40 milliGRAM(s) Oral daily  losartan 50 milliGRAM(s) Oral daily  pantoprazole    Tablet 40 milliGRAM(s) Oral before breakfast  sodium chloride 0.9%. 500 milliLiter(s) (50 mL/Hr) IV Continuous <Continuous>  sodium chloride 0.9%. 500 milliLiter(s) (50 mL/Hr) IV Continuous <Continuous>  sodium chloride 0.9%. 500 milliLiter(s) (75 mL/Hr) IV Continuous <Continuous>    MEDICATIONS  (PRN):  acetaminophen   Tablet .. 650 milliGRAM(s) Oral every 6 hours PRN Mild Pain (1 - 3)  oxycodone    5 mG/acetaminophen 325 mG 1 Tablet(s) Oral every 4 hours PRN Severe Pain (7 - 10)      Allergies    No Known Allergies    Intolerances        SOCIAL HISTORY:    FAMILY HISTORY:  No pertinent family history in first degree relatives        PHYSICAL EXAMINATION   General: in mild distress due to pain, improved compared to yesterday   NEURO: follows commands, no focal deficits  PULM: nonlabored breathing, no respiratory distress  ABD: nondistended, tender with deep palpation diffusely, no rebound, some voluntary guarding, no tympany.  Epigastric nodulation that is soft, mobile, rubbery with fat consistency, no overlying skin changes. Well healed prior incisions.  EXTREM: WWP, no edema, no calf tenderness          Vital Signs Last 24 Hrs  T(C): 36.5 (20 May 2021 13:24), Max: 37 (20 May 2021 09:11)  T(F): 97.7 (20 May 2021 13:24), Max: 98.6 (20 May 2021 09:11)  HR: 68 (20 May 2021 13:30) (68 - 105)  BP: 118/75 (20 May 2021 13:30) (113/54 - 132/90)  BP(mean): 90 (20 May 2021 13:30) (76 - 90)  RR: 16 (20 May 2021 13:30) (16 - 18)  SpO2: 98% (20 May 2021 13:30) (96% - 98%)    I&O's Summary    20 May 2021 07:01  -  20 May 2021 15:40  --------------------------------------------------------  IN: 180 mL / OUT: 0 mL / NET: 180 mL            LABS:                        11.1   10.07 )-----------( 231      ( 20 May 2021 07:48 )             32.4         132<L>  |  96  |  16  ----------------------------<  93  3.5   |  25  |  1.36<H>    Ca    9.3      20 May 2021 07:48  Mg     2.0         TPro  6.7  /  Alb  3.6  /  TBili  0.8  /  DBili  x   /  AST  15  /  ALT  12  /  AlkPhos  114          CAPILLARY BLOOD GLUCOSE        LIVER FUNCTIONS - ( 19 May 2021 06:59 )  Alb: 3.6 g/dL / Pro: 6.7 g/dL / ALK PHOS: 114 U/L / ALT: 12 U/L / AST: 15 U/L / GGT: x             Cultures:      RADIOLOGY & ADDITIONAL STUDIES:  EXAM: MR ABDOMEN IC    PROCEDURE DATE: 2021        INTERPRETATION: Attending over read. Agree with the below report with following modifications. Acute diffuse interstitial pancreatitis.. No evidence of pancreatic necrosis. No choledocholithiasis. No cholelithiasis. The main pancreatic duct drains via the major and minor papillae. Pancreatic duct diameter measures up to 0.33 cm-mildly dilated. No pancreatic tumor. Small amount of ascites. Splenic vein intact. The calcifications in relation to the pancreatico duodenal groove/lateral pancreatic head cannot be appreciated on this MRI.          [facility]  Preliminary Radiology Report  Call: [phone]  assistance Online chat: [email or web address]  Patient Name: JAEL LYN MRN: [id number]   (Age): 1965 55 Gender: M  Date of Exam: 2021 Accession: [id number]  Referring Physician: Ania Ley # of Images: [id number]  Ordered As: MR ABDOMEN W  PROCEDURE INFORMATION:  Exam: MR Abdomen With Contrast  Exam date and time: 2021 11:17 PM  Age: 55 years old  Clinical indication: Abdominal pain; Prior surgery; Surgery type: Sigmoidectomy  TECHNIQUE:  Imaging protocol: MR of the abdomen with 7.5 cc Gadavist intravenous contrast.  COMPARISON:  CT ABDOMEN AND PELVIS WITH ORAL CONTRAST WITH IV CONTRAST 2021 9:05 PM  FINDINGS:  Lungs: Right lower lobe atelectasis.  Liver: No mass.  Gallbladder and bile ducts: Subcentimeter cyst versus biliary hamartoma is seen in the right lobe of  the liver.  Pancreas: Fluid and stranding is seen about the pancreas, compatible with acute pancreatitis, as on  CT. Again seen is an irregular focus of hypoenhancement in the head of the pancreas, measuring  approximately 3.2 x 2.0 cm (image 66, series 20, compatible with findings on CT. Calcifications in the  head of the pancreas are better appreciated on CT.  Spleen: Unremarkable. No splenomegaly.  Adrenal glands: Unremarkable. No mass.  Kidneys and ureters: Unremarkable. No solid mass. No hydronephrosis.  Stomach and bowel: Visualized stomach and intestines are unremarkable.  Intraperitoneal space: Trace perihepatic fluid.  Arteries: No abdominal aortic aneurysm.  Bones/joints: Unremarkable.  Soft tissues: Unremarkable.    IMPRESSION:  1. Acute pancreatitis.  2. Irregular focus of hypoenhancement in the head of the pancreas where there are pancreatic  calcifications, as seen on CT. Consider outpatient ERCP to exclude neoplasm. Defer to on-site  radiologist for ultimate follow-up recommendations.  Thank you for allowing us to participate in the care of your patient.  Dictated and Authenticated by: Bryce Bush MD  2021 12:53 AM Eastern Time ([place] & [place])    The above report was submitted by the [facility] attending radiologist and copied to PowerScribe by resident Dr. Vasques.          Thank you for the opportunity to participate in the care of this patient.    SHERRY VASQUES M.D., RADIOLOGY RESIDENT  This document has been electronically signed.  RUTH AREVALO MD; Attending Radiologist  This document has been electronically signed. May 20 2021 7:19AM      Plan:  This is a 55 years old male with PMH of tobacco use (for 30 years, 4 cig/day), HTN, HF (EF 40-45%), CAD, Colonic adenomatous lesion, GERD. PSH: Robotic Assisted Sigmoidectomy in 2020.  Admitted by cardiology for LE arterial disease, S/p angiogram and angioplasty. Presenting with  sudden severe epigastric pain with NBNB vomit. His CT scan showed acute interstitial pancreatitis. MRI showed again picture of acute pancreatitis, but the calcifications at the lateral pancreatic head cannot be appreciated. Surgical Oncology consulted for pancreatic head findings    - No surgical intervention from our perspective at this point  - Patient needs cholecystectomy  - Patient can f/u with Dr. Kaufman on an outpatient basis with the results of his prior studies  - rest of care per primary team  - Plan discussed with attending    Thank you for the consult

## 2021-05-20 NOTE — PROGRESS NOTE ADULT - ASSESSMENT
This is a 55 years old male with PMH of tobacco use (for 30 years, 4 cig/day), HTN, HF (EF 40-45%), CAD, Colonic adenomatous lesion, GERD. PSH: Robotic Assisted Sigmoidectomy in November 2020.  Admitted by cardiology for LE arterial disease, S/p angiogram and angioplasty. Presenting with  sudden severe epigastric pain with NBNB vomit. His CT scan showed acute interstitial pancreatitis. MRI showed again picture of acute pancreatitis, but the calcifications at the lateral pancreatic head cannot be appreciated.    - No acute surgical intervention at this point  - We recommend adequate pain control  - IVF per primary team  - Appreciate GI recs  - Rest of care per primary team  - Team 4C will sign off; We will consult Team 1 (Dr. Kaufman) for further recommendations

## 2021-05-20 NOTE — PROGRESS NOTE ADULT - PROBLEM SELECTOR PLAN 5
Cardiac Cath 10/26/20: positive FFR 0.7 of mRCA, LM normal, LAD mild luminal, Ramus small vessel mild diffuse, mLCx 30%, dLCx 40%, OM1 normal.  - c/w Atorvastatin 40mg QHS

## 2021-05-20 NOTE — CONSULT NOTE ADULT - SUBJECTIVE AND OBJECTIVE BOX
GASTROENTEROLOGY CONSULT NOTE  HPI:  Covid: at [facility] 05/16/2021  Cardiologist: Dr. Loza  Pharmacy: [facility]   Patient is a 56yo Male, current smoker, with PMHx of HTN, HFmEF (EF 40-45% by echo 9/2020), known CAD s/p PCI HARJINDER mRCA on 5/10/2021 at [facility] , Colonic adenomatous lesion s/p Sigmoidectomy 11/23/20, who presented to his cardiologist, Dr. Loza for follow-up and has been having weakness and a "rubbery feeling" in his legs while walking 4-5 blocks. Patient denies CP, SOB, dizziness, diaphoresis, LE edema, orthopnea, palpitations, PND, fatigue, n/v/d, syncope, non-healing ulcers, numbness/tingling, color changes, fever, chills, or sick contacts.   CTA Aorta w/ LE Runoff (4/22/21) revealing occluded R distal SFA, occluded L SFA, ectatic R common illiac artery and L internal iliac artery, nonvisualization of distal R anterior Tibial (probably occluded). Cardiac Cath 10/26/20: positive FFR 0.7 of mRCA, LM normal, LAD mild luminal, Ramus small vessel mild diffuse, mLCx 30%, dLCx 40%, OM1 normal.  Echocardiogram 9/2020 revealing LVEF 40-45%, moderate concentric LVH, Grade 1 Diastolic Dysfunction, resting regional wall motion abnormalities of the inferoseptal wall.  In light of patient's risk factors, [place] Class III symptoms, and abnormal CTA, patient presents for peripheral angiogram with intervention if clinically indicated.    (13 May 2021 18:24)    Allergies    No Known Allergies    Intolerances      Home Medications:  carvedilol 12.5 mg oral tablet: 1 tab(s) orally 2 times a day (17 May 2021 16:31)  losartan 50 mg oral tablet: 1 tab(s) orally once a day (17 May 2021 16:31)  Vitamin D2 50,000 intl units (1.25 mg) oral capsule: 1 cap(s) orally once a day (17 May 2021 16:31)    MEDICATIONS:  MEDICATIONS  (STANDING):  aspirin enteric coated 81 milliGRAM(s) Oral daily  atorvastatin 40 milliGRAM(s) Oral at bedtime  carvedilol 12.5 milliGRAM(s) Oral every 12 hours  chlorhexidine 4% Liquid 1 Application(s) Topical once  chlorthalidone 25 milliGRAM(s) Oral daily  clopidogrel Tablet 75 milliGRAM(s) Oral daily  famotidine    Tablet 40 milliGRAM(s) Oral daily  losartan 50 milliGRAM(s) Oral daily  pantoprazole    Tablet 40 milliGRAM(s) Oral before breakfast  sodium chloride 0.9%. 500 milliLiter(s) (50 mL/Hr) IV Continuous <Continuous>  sodium chloride 0.9%. 500 milliLiter(s) (75 mL/Hr) IV Continuous <Continuous>    MEDICATIONS  (PRN):  acetaminophen   Tablet .. 650 milliGRAM(s) Oral every 6 hours PRN Mild Pain (1 - 3)    PAST MEDICAL & SURGICAL HISTORY:  HTN (hypertension)    Colonic mass    Ingrown toenail    Smoker    S/P laparoscopic-assisted sigmoidectomy      FAMILY HISTORY:  No pertinent family history in first degree relatives      SOCIAL HISTORY:  [place]: [ ] Current, [ ] Former, [ ] Never; Pack Years:  Alcohol:  Illicit Drugs:    REVIEW OF SYSTEMS:  CONSTITUTIONAL: No fevers or chills  HEENT: No visual changes; No vertigo or throat pain   NECK: No pain or stiffness  RESPIRATORY: No cough, wheezing, hemoptysis; No shortness of breath  CARDIOVASCULAR: No chest pain or palpitations  GASTROINTESTINAL: as above  GENITOURINARY: No dysuria, frequency or hematuria  NEUROLOGICAL: No numbness or weakness  SKIN: No itching, burning, rashes, or lesions   All other 10 review of systems is negative unless indicated above.    Vital Signs Last 24 Hrs  T(C): 36.7 (20 May 2021 04:31), Max: 36.9 (19 May 2021 09:34)  T(F): 98.1 (20 May 2021 04:31), Max: 98.4 (19 May 2021 09:34)  HR: 86 (20 May 2021 05:12) (83 - 105)  BP: 132/90 (20 May 2021 05:12) (113/54 - 143/83)  BP(mean): 76 (19 May 2021 18:36) (76 - 106)  RR: 16 (20 May 2021 05:12) (16 - 18)  SpO2: 97% (20 May 2021 05:12) (96% - 98%)      PHYSICAL EXAM:    General: Well developed; in no acute distress  Eyes: Anicteric sclerae, moist conjunctivae  HENT: Moist mucous membranes  Neck: Trachea midline, supple  Lungs: Normal respiratory effort  Cardiovascular: RRR  Abdomen: Soft, epigastric tenderness, non-distended; No rebound or guarding  Extremities: Normal range of motion, No clubbing, cyanosis or edema  Neurological: Alert and oriented x3  Skin: Warm and dry. No obvious rash    LABS:                        11.1   10.07 )-----------( 231      ( 20 May 2021 07:48 )             32.4     05-19    133<L>  |  96  |  14  ----------------------------<  94  3.6   |  24  |  1.34<H>    Ca    9.2      19 May 2021 06:59  Mg     1.9     05-19    TPro  6.7  /  Alb  3.6  /  TBili  0.8  /  DBili  x   /  AST  15  /  ALT  12  /  AlkPhos  114  05-19            RADIOLOGY & ADDITIONAL STUDIES:     Reviewed

## 2021-05-20 NOTE — PROGRESS NOTE ADULT - ASSESSMENT
54yo Male, current smoker, with PMHx of HTN, systolic CHF (EF 40-45% by echo 9/2020), known CAD s/p PCI HARJINDER mRCA on 5/10/2021 at [facility] , Colonic adenomatous lesion s/p Sigmoidectomy 11/23/20, who presented to his cardiologist, Dr. Loza for follow-up and has been having weakness and a "rubbery feeling" in his legs while walking 4-5 blocks. Pt presented peripheral angiogram and s/p artherectomy/PTCA/HARJINDER x2 L SFA  prox to distal. Hospital course c/b abdominal pain and vomiting 5/18 for which discharge delayed. Surgery consulted for questionable pancreatitis however no plan for surgical intervention. GI consulted and rec blood work and MRI Pancreatitis protocal prior to d/c. Discharge pending workup.

## 2021-05-20 NOTE — CONSULT NOTE ADULT - SUBJECTIVE AND OBJECTIVE BOX
JAEL LYN  55y Male    56yo Male, current smoker, with PMHx of HTN, systolic CHF (EF 40-45% by echo 9/2020), CAD (s/p PCI HARJINDER mRCA on 5/10/2021 at [facility]), colonic adenomatous lesion s/p sigmoidectomy 11/23/20, who presented to [facility] 5/17 for peripheral angiogram and artherectomy/ percutaneous transluminal coronary angioplasty/ TCA/ HARJINDER x2 L SFA  prox to distal on 5/17. Hospital course was c/b abdominal pain and vomiting 5/18 for which discharge delayed. CT abdomen/pelvis with focus of hypoattenuation in the pancreatic head. Lipase elevated to 1875. Surgery consulted however no plan for surgical intervention. GI consulted and rec for MRI Pancreatitis protocal prior which showed acute pancreatitis. Patient was started on IVF and treated with pain meds. Medicine consulted for possible transfer to medicine.    Pt states that pain is much improved since receiving percocet this AM. Has been tolerating food but states that he does not like the food so has not been eating very much although he is hungry. Pt states that he is ambulating on his own without issue. Denies chest pain, SOB.    Pmhx: as above  Pshx: as above  Meds: atorvastatin 40mg, asa 81, plavix 75mg, [id number], coreg 12.5mg BID,  Vitamin D  Fhx: denies  Sohx: smokes 4 cigarettes daily, cutting down from 7-8cig/day, ~10-15 pack year history; infrequent alcohol use, drinks 2-3 drinks on occasional weekend; denies illicit drug use; lives with wife in [place]    PAST MEDICAL & SURGICAL HISTORY:  HTN (hypertension)  Colonic mass  Ingrown toenail  Smoker  S/P laparoscopic-assisted sigmoidectomy    REVIEW OF SYSTEMS:  CONSTITUTIONAL: No fever, weight loss, or fatigue  EYES: No eye pain, visual disturbances, or discharge  ENMT:  No difficulty hearing, tinnitus, vertigo; No sinus or throat pain  NECK: No pain or stiffness  BREASTS: No pain, masses, or nipple discharge  RESPIRATORY: No cough, wheezing, chills or hemoptysis; No shortness of breath  CARDIOVASCULAR: No chest pain, palpitations, dizziness, or leg swelling  GASTROINTESTINAL: No abdominal or epigastric pain. No nausea, vomiting, or hematemesis; No diarrhea or constipation. No melena or hematochezia.  GENITOURINARY: No dysuria, frequency, hematuria, or incontinence  NEUROLOGICAL: No headaches, memory loss, loss of strength, numbness, or tremors  SKIN: No itching, burning, rashes, or lesions   LYMPH NODES: No enlarged glands  ENDOCRINE: No heat or cold intolerance; No hair loss  MUSCULOSKELETAL: No joint pain or swelling; No muscle, back, or extremity pain  PSYCHIATRIC: No depression, anxiety, mood swings, or difficulty sleeping  HEME/LYMPH: No easy bruising, or bleeding gums  ALLERY AND IMMUNOLOGIC: No hives or eczema    T(C): 36.5 (05-20-21 @ 13:24), Max: 37 (05-20-21 @ 09:11)  HR: 68 (05-20-21 @ 13:30) (68 - 105)  BP: 118/75 (05-20-21 @ 13:30) (113/54 - 132/90)  RR: 16 (05-20-21 @ 13:30) (16 - 18)  SpO2: 98% (05-20-21 @ 13:30) (96% - 98%)  Wt(kg): --Vital Signs Last 24 Hrs  T(C): 36.5 (20 May 2021 13:24), Max: 37 (20 May 2021 09:11)  T(F): 97.7 (20 May 2021 13:24), Max: 98.6 (20 May 2021 09:11)  HR: 68 (20 May 2021 13:30) (68 - 105)  BP: 118/75 (20 May 2021 13:30) (113/54 - 132/90)  BP(mean): 90 (20 May 2021 13:30) (76 - 90)  RR: 16 (20 May 2021 13:30) (16 - 18)  SpO2: 98% (20 May 2021 13:30) (96% - 98%)    PHYSICAL EXAM:  GENERAL: NAD, well-groomed, well-developed  HEAD:  Atraumatic, Normocephalic  EYES: EOMI, PERRLA, conjunctiva and sclera clear  ENMT: No tonsillar erythema, exudates, or enlargement; Moist mucous membranes, Good dentition  NERVOUS SYSTEM:  Alert & Oriented X3, Good concentration  CHEST/LUNG: Clear to percussion bilaterally; No rales, rhonchi, wheezing, or rubs  HEART: Regular rate and rhythm; No murmurs, rubs, or gallops  ABDOMEN: Soft, epigastric tenderness to deep palpation, Nondistended; Bowel sounds present  EXTREMITIES:  2+ Peripheral Pulses, No clubbing, cyanosis, or edema  SKIN: No rashes or lesions    Consultant(s) Notes Reviewed:  [x ] YES  [ ] NO  Care Discussed with Consultants/Other Providers [ x] YES  [ ] NO    LABS:  CBC   05-20-21 @ 07:48  Hematcorit 32.4  Hemoglobin 11.1  Mean Cell Hemoglobin 30.3  Platelet Count-Automated 231  RBC Count 3.66  Red Cell Distrib Width 13.6  Wbc Count 10.07    BMP  05-20-21 @ 07:48  Anion Gap. Serum 11  Blood Urea Nitrogen,Serm 16  Calcium, Total Serum 9.3  Carbon Dioxide, Serum 25  Chloride, Serum 96  Creatinine, Serum 1.36  eGFR in  68  eGFR in Non Afican American 59  Gloucose, serum 93  Potassium, Serum 3.5  Sodium, Serum 132    05-19-21 @ 06:59  Anion Gap. Serum 13  Blood Urea Nitrogen,Serm 14  Calcium, Total Serum 9.2  Carbon Dioxide, Serum 24  Chloride, Serum 96  Creatinine, Serum 1.34  eGFR in  69  eGFR in Non Afican American 60  Gloucose, serum 94  Potassium, Serum 3.6  Sodium, Serum 133    05-18-21 @ 07:09  Anion Gap. Serum 11  Blood Urea Nitrogen,Serm 16  Calcium, Total Serum 9.3  Carbon Dioxide, Serum 25  Chloride, Serum 95  Creatinine, Serum 1.48  eGFR in  61  eGFR in Non Afican American 53  Gloucose, serum 102  Potassium, Serum 3.9  Sodium, Serum 131    05-17-21 @ 16:02  Anion Gap. Serum 14  Blood Urea Nitrogen,Serm 16  Calcium, Total Serum 9.7  Carbon Dioxide, Serum 27  Chloride, Serum 92  Creatinine, Serum 1.51  eGFR in  60  eGFR in Non Afican American 52  Gloucose, serum 119  Potassium, Serum 3.7  Sodium, Serum 133    CMP  05-20-21 @ 07:48  Tata Aminotransferase(ALT/SGPT)--  Albumin, Serum --  Alkaline Phosphatase, Serum --  Anion Gap, Serum 11  Aspartate Aminotransferase (AST/SGOT)--  Bilirubin Total, Serum --  Blood Urea Nitrogen, Serum 16  Calcium,Total Serum 9.3  Carbon Dioxide, Serum 25  Chloride, Serum 96  Creatinine, Serum 1.36  eGFR if  68  eGFR if Non African American 59  Glucose, Serum 93  Potassium, Serum 3.5  Protein Total, Serum --  Sodium, Serum 132    05-19-21 @ 06:59  Tata Aminotransferase(ALT/SGPT)12  Albumin, Serum 3.6  Alkaline Phosphatase, Serum 114  Anion Gap, Serum 13  Aspartate Aminotransferase (AST/SGOT)15  Bilirubin Total, Serum 0.8  Blood Urea Nitrogen, Serum 14  Calcium,Total Serum 9.2  Carbon Dioxide, Serum 24  Chloride, Serum 96  Creatinine, Serum 1.34  eGFR if  69  eGFR if Non African American 60  Glucose, Serum 94  Potassium, Serum 3.6  Protein Total, Serum 6.7  Sodium, Serum 133    05-18-21 @ 07:09  Tata Aminotransferase(ALT/SGPT)15  Albumin, Serum 3.9  Alkaline Phosphatase, Serum 120  Anion Gap, Serum 11  Aspartate Aminotransferase (AST/SGOT)21  Bilirubin Total, Serum 0.7  Blood Urea Nitrogen, Serum 16  Calcium,Total Serum 9.3  Carbon Dioxide, Serum 25  Chloride, Serum 95  Creatinine, Serum 1.48  eGFR if  61  eGFR if Non African American 53  Glucose, Serum 102  Potassium, Serum 3.9  Protein Total, Serum 7.0  Sodium, Serum 131    05-17-21 @ 16:02  Tata Aminotransferase(ALT/SGPT)18  Albumin, Serum 4.6  Alkaline Phosphatase, Serum 141  Anion Gap, Serum 14  Aspartate Aminotransferase (AST/SGOT)27  Bilirubin Total, Serum 0.7  Blood Urea Nitrogen, Serum 16  Calcium,Total Serum 9.7  Carbon Dioxide, Serum 27  Chloride, Serum 92  Creatinine, Serum 1.51  eGFR if  60  eGFR if Non African American 52  Glucose, Serum 119  Potassium, Serum 3.7  Protein Total, Serum 7.6  Sodium, Serum 133    PT/INR  05-17-21 @ 16:03  INR 0.93  Prothrombin Time Comment --  Prothrobin Time, [id number]    Amylase/Lipase  05-19-21 @ 15:03  Amylase, Serum Total 1139  Lipase, Serum 1875    RADIOLOGY & ADDITIONAL TESTS:  < from: CT Abdomen and Pelvis w/ Oral Cont and w/ IV Cont (05.18.21 @ 21:14) >  IMPRESSION:  1. Mild ascites with the dominant portion of fluid seen around and engorged pancreas. Please  correlate for acute pancreatitis.  2. Focus of hypoattenuation in the pancreatic head which may be related to necrosis. Neoplasm is  not excluded.  3. Calcifications in the pancreatic head/uncinate process, compatible with chronic pancreatitis.    < from: MR Abdomen w/ IV Cont (05.19.21 @ 23:37) >  IMPRESSION:  1. Acute pancreatitis.  2. Irregular focus of hypoenhancement in the head of the pancreas where there are pancreatic  calcifications, as seen on CT. Consider outpatient ERCP to exclude neoplasm. Defer to on-site  radiologist for ultimate follow-up recommendations.    Imaging Personally Reviewed:  [ ] YES  [ ] NO

## 2021-05-20 NOTE — PROGRESS NOTE ADULT - PROBLEM SELECTOR PLAN 1
Pt awoke 5/18 w/ continuously belching followed by reported severe epigastric pain and multiple episodes of small amounts of non-blood bilious vomiting. Now improved.  - Abd exam: BS+, non-mobile soft tender non-reducible approx 1.5x1.5" lump in epigastric region (chronic), LUQ pain to palpation,  - Amalyze 1875, Lipase 1139 (elevated), LFTs WNL  - Abd xray 5/18/21: Mildly distended loops of small bowel. Gas is seen in nondistended colon to the rectosigmoid region. Abbreviated differential includes a small bowel ileus versus a low-grade and/or early small bowel obstruction  -CT A/P 5/18/21: Mild ascites with the dominant portion of fluid seen around and engorged pancreas, Calcifications in the pancreatic head/uncinate process, compatible with chronic pancreatitis  - Surgery consulted: Believe epigastric lump is likely a lipoma, no surgical intervention, rec GI consult  -GI recs: Igg4 levels, Trypsinogen, MRI abdomen pancreatic protocol (ordered - verified doesn't need NPO), f/u results  -CONT: famotidine 40mg QD, MiraLax PRN for gas/pain

## 2021-05-20 NOTE — CONSULT NOTE ADULT - ATTENDING COMMENTS
55 years old male with PMH of tobacco use (for 30 years, 4 cig/day), HTN, HF (EF 40-45%), CAD, Colonic adenomatous lesion, GERD. PSH: Robotic Assisted Sigmoidectomy in November 2020.  Admitted by cardiology for LE arterial disease, S/p angiogram and angioplasty. Presenting with sudden severe epigastric pain with NBNB vomit. Imaging consistent with Acute pancreatitis and calcifications in the head for the pancreas and Stable hypodensity. Lipase elevated. GB sludge, no biiliary dilation. Normal LTs    # Acute pancreatitis  - likely gallstone pancreatitis  - supportive care: IVF, pain control  - Cholecystectomy per GS  - MRI in 3 months. as follow up after recovery from interstitial pancreatiits
agree with assessment and plan as documented by resident.
ACS Attending: Patient seen and examined with surgical housestaff. Generally agree with their assessment and plan. However, recommend CT scan of the abdomen and pelvis which patient was prepping for. Currently patient uncomfortable. Would add that also must consider potential sequelae of vascular intervention (dissection etc). Await CT scan. discussed with night ACS staff for follow up.

## 2021-05-20 NOTE — DISCHARGE NOTE NURSING/CASE MANAGEMENT/SOCIAL WORK - PATIENT PORTAL LINK FT
You can access the FollowMyHealth Patient Portal offered by  by registering at the following website: [email or web address]. By joining [facility]’s FollowMyHealth portal, you will also be able to view your health information using other applications (apps) compatible with our system.

## 2021-05-20 NOTE — PROGRESS NOTE ADULT - SUBJECTIVE AND OBJECTIVE BOX
SUBJECTIVE: Patient seen and examined bedside by resident. He reports only minimal improvement of his abdominal pain and is frustrated that it's not being addressed. He had a MRI scan overnight that showed a hypoenhancing area at the head of the pancreas. He has elevated amylase and lipase, IgG4 and trypsinogen were recommended by GI.    aspirin enteric coated 81 milliGRAM(s) Oral daily  carvedilol 12.5 milliGRAM(s) Oral every 12 hours  chlorthalidone 25 milliGRAM(s) Oral daily  clopidogrel Tablet 75 milliGRAM(s) Oral daily  losartan 50 milliGRAM(s) Oral daily      Vital Signs Last 24 Hrs  T(C): 36.7 (20 May 2021 04:31), Max: 36.9 (19 May 2021 09:34)  T(F): 98.1 (20 May 2021 04:31), Max: 98.4 (19 May 2021 09:34)  HR: 86 (20 May 2021 05:12) (83 - 105)  BP: 132/90 (20 May 2021 05:12) (113/54 - 143/83)  BP(mean): 76 (19 May 2021 18:36) (76 - 106)  RR: 16 (20 May 2021 05:12) (16 - 18)  SpO2: 97% (20 May 2021 05:12) (96% - 98%)  I&O's Detail      PHYSICAL EXAMINATION   General: in mild distress due to pain, improved compared to yesterday   NEURO: follows commands, no focal deficits  PULM: nonlabored breathing, no respiratory distress  ABD: nondistended, tender with deep palpation diffusely, no rebound, some voluntary guarding, no tympany.  Epigastric nodulation that is soft, mobile, rubbery with fat consistency, no overlying skin changes. Well healed prior incisions.  EXTREM: WWP, no edema, no calf tenderness      LABS:                        12.8   10.28 )-----------( 259      ( 19 May 2021 06:59 )             36.8         133<L>  |  96  |  14  ----------------------------<  94  3.6   |  24  |  1.34<H>    Ca    9.2      19 May 2021 06:59  Mg     1.9         TPro  6.7  /  Alb  3.6  /  TBili  0.8  /  DBili  x   /  AST  15  /  ALT  12  /  AlkPhos  114            RADIOLOGY & ADDITIONAL STUDIES:    EXAM: MR ABDOMEN IC    PROCEDURE DATE: 2021        INTERPRETATION: Attending over read. Agree with the below report with following modifications. Acute diffuse interstitial pancreatitis.. No evidence of pancreatic necrosis. No choledocholithiasis. No cholelithiasis. The main pancreatic duct drains via the major and minor papillae. Pancreatic duct diameter measures up to 0.33 cm-mildly dilated. No pancreatic tumor. Small amount of ascites. Splenic vein intact. The calcifications in relation to the pancreatico duodenal groove/lateral pancreatic head cannot be appreciated on this MRI.          [facility]  Preliminary Radiology Report  Call: [phone]  assistance Online chat: [email or web address]  Patient Name: JAEL LYN MRN: [id number]   (Age): 1965 55 Gender: M  Date of Exam: 2021 Accession: [id number]  Referring Physician: Ania Ley # of Images: [id number]  Ordered As: MR ABDOMEN W  PROCEDURE INFORMATION:  Exam: MR Abdomen With Contrast  Exam date and time: 2021 11:17 PM  Age: 55 years old  Clinical indication: Abdominal pain; Prior surgery; Surgery type: Sigmoidectomy  TECHNIQUE:  Imaging protocol: MR of the abdomen with 7.5 cc Gadavist intravenous contrast.  COMPARISON:  CT ABDOMEN AND PELVIS WITH ORAL CONTRAST WITH IV CONTRAST 2021 9:05 PM  FINDINGS:  Lungs: Right lower lobe atelectasis.  Liver: No mass.  Gallbladder and bile ducts: Subcentimeter cyst versus biliary hamartoma is seen in the right lobe of  the liver.  Pancreas: Fluid and stranding is seen about the pancreas, compatible with acute pancreatitis, as on  CT. Again seen is an irregular focus of hypoenhancement in the head of the pancreas, measuring  approximately 3.2 x 2.0 cm (image 66, series 20, compatible with findings on CT. Calcifications in the  head of the pancreas are better appreciated on CT.  Spleen: Unremarkable. No splenomegaly.  Adrenal glands: Unremarkable. No mass.  Kidneys and ureters: Unremarkable. No solid mass. No hydronephrosis.  Stomach and bowel: Visualized stomach and intestines are unremarkable.  Intraperitoneal space: Trace perihepatic fluid.  Arteries: No abdominal aortic aneurysm.  Bones/joints: Unremarkable.  Soft tissues: Unremarkable.    IMPRESSION:  1. Acute pancreatitis.  2. Irregular focus of hypoenhancement in the head of the pancreas where there are pancreatic  calcifications, as seen on CT. Consider outpatient ERCP to exclude neoplasm. Defer to on-site  radiologist for ultimate follow-up recommendations.  Thank you for allowing us to participate in the care of your patient.  Dictated and Authenticated by: Bryce Bush MD  2021 12:53 AM Eastern Time ([place] & [place])    The above report was submitted by the [facility] attending radiologist and copied to [employer] by resident Dr. Vasques.          Thank you for the opportunity to participate in the care of this patient.    SHERRY VASQUES M.D., RADIOLOGY RESIDENT  This document has been electronically signed.  RUTH AREVALO MD; Attending Radiologist  This document has been electronically signed. May 20 2021 7:19AM

## 2021-05-22 LAB
IGG SERPL-MCNC: 967 MG/DL — SIGNIFICANT CHANGE UP (ref 603–1613)
IGG1 SER-MCNC: 631 MG/DL — SIGNIFICANT CHANGE UP (ref 248–810)
IGG2 SER-MCNC: 49 MG/DL — LOW (ref 130–555)
IGG3 SER-MCNC: 31 MG/DL — SIGNIFICANT CHANGE UP (ref 15–102)
IGG4 SER-MCNC: 7 MG/DL — SIGNIFICANT CHANGE UP (ref 2–96)

## 2021-05-28 DIAGNOSIS — I70.213 ATHEROSCLEROSIS OF NATIVE ARTERIES OF EXTREMITIES WITH INTERMITTENT CLAUDICATION, BILATERAL LEGS: ICD-10-CM

## 2021-05-28 DIAGNOSIS — E87.1 HYPO-OSMOLALITY AND HYPONATREMIA: ICD-10-CM

## 2021-05-28 DIAGNOSIS — E78.5 HYPERLIPIDEMIA, UNSPECIFIED: ICD-10-CM

## 2021-05-28 DIAGNOSIS — F17.210 NICOTINE DEPENDENCE, CIGARETTES, UNCOMPLICATED: ICD-10-CM

## 2021-05-28 DIAGNOSIS — I73.9 PERIPHERAL VASCULAR DISEASE, UNSPECIFIED: ICD-10-CM

## 2021-05-28 DIAGNOSIS — I13.0 HYPERTENSIVE HEART AND CHRONIC KIDNEY DISEASE WITH HEART FAILURE AND STAGE 1 THROUGH STAGE 4 CHRONIC KIDNEY DISEASE, OR UNSPECIFIED CHRONIC KIDNEY DISEASE: ICD-10-CM

## 2021-05-28 DIAGNOSIS — K21.9 GASTRO-ESOPHAGEAL REFLUX DISEASE WITHOUT ESOPHAGITIS: ICD-10-CM

## 2021-05-28 DIAGNOSIS — D64.9 ANEMIA, UNSPECIFIED: ICD-10-CM

## 2021-05-28 DIAGNOSIS — K85.10 BILIARY ACUTE PANCREATITIS WITHOUT NECROSIS OR INFECTION: ICD-10-CM

## 2021-05-28 DIAGNOSIS — I42.9 CARDIOMYOPATHY, UNSPECIFIED: ICD-10-CM

## 2021-05-28 DIAGNOSIS — I25.10 ATHEROSCLEROTIC HEART DISEASE OF NATIVE CORONARY ARTERY WITHOUT ANGINA PECTORIS: ICD-10-CM

## 2021-05-28 DIAGNOSIS — N18.9 CHRONIC KIDNEY DISEASE, UNSPECIFIED: ICD-10-CM

## 2021-05-28 DIAGNOSIS — Z98.890 OTHER SPECIFIED POSTPROCEDURAL STATES: ICD-10-CM

## 2021-05-28 DIAGNOSIS — R18.8 OTHER ASCITES: ICD-10-CM

## 2021-05-28 DIAGNOSIS — I50.20 UNSPECIFIED SYSTOLIC (CONGESTIVE) HEART FAILURE: ICD-10-CM

## 2021-05-28 DIAGNOSIS — Z79.899 OTHER LONG TERM (CURRENT) DRUG THERAPY: ICD-10-CM

## 2021-05-28 DIAGNOSIS — K56.609 UNSPECIFIED INTESTINAL OBSTRUCTION, UNSPECIFIED AS TO PARTIAL VERSUS COMPLETE OBSTRUCTION: ICD-10-CM

## 2021-05-28 DIAGNOSIS — I70.92 CHRONIC TOTAL OCCLUSION OF ARTERY OF THE EXTREMITIES: ICD-10-CM

## 2021-06-18 LAB — TRYPSINOGEN SERPL-MCNC: HIGH NG/ML (ref 169–773)

## 2021-06-22 PROCEDURE — 85610 PROTHROMBIN TIME: CPT

## 2021-06-22 PROCEDURE — 83519 RIA NONANTIBODY: CPT

## 2021-06-22 PROCEDURE — C1894: CPT

## 2021-06-22 PROCEDURE — 36415 COLL VENOUS BLD VENIPUNCTURE: CPT

## 2021-06-22 PROCEDURE — 74019 RADEX ABDOMEN 2 VIEWS: CPT

## 2021-06-22 PROCEDURE — 83735 ASSAY OF MAGNESIUM: CPT

## 2021-06-22 PROCEDURE — 86769 SARS-COV-2 COVID-19 ANTIBODY: CPT

## 2021-06-22 PROCEDURE — 85025 COMPLETE CBC W/AUTO DIFF WBC: CPT

## 2021-06-22 PROCEDURE — C1725: CPT

## 2021-06-22 PROCEDURE — 80048 BASIC METABOLIC PNL TOTAL CA: CPT

## 2021-06-22 PROCEDURE — C1884: CPT

## 2021-06-22 PROCEDURE — 74177 CT ABD & PELVIS W/CONTRAST: CPT

## 2021-06-22 PROCEDURE — 80076 HEPATIC FUNCTION PANEL: CPT

## 2021-06-22 PROCEDURE — A9585: CPT

## 2021-06-22 PROCEDURE — 82150 ASSAY OF AMYLASE: CPT

## 2021-06-22 PROCEDURE — C2623: CPT

## 2021-06-22 PROCEDURE — 83690 ASSAY OF LIPASE: CPT

## 2021-06-22 PROCEDURE — 80053 COMPREHEN METABOLIC PANEL: CPT

## 2021-06-22 PROCEDURE — 85730 THROMBOPLASTIN TIME PARTIAL: CPT

## 2021-06-22 PROCEDURE — 82550 ASSAY OF CK (CPK): CPT

## 2021-06-22 PROCEDURE — C1874: CPT

## 2021-06-22 PROCEDURE — 82787 IGG 1 2 3 OR 4 EACH: CPT

## 2021-06-22 PROCEDURE — C1876: CPT

## 2021-06-22 PROCEDURE — 74182 MRI ABDOMEN W/CONTRAST: CPT

## 2021-06-22 PROCEDURE — C1887: CPT

## 2021-06-22 PROCEDURE — 80061 LIPID PANEL: CPT

## 2021-06-22 PROCEDURE — C1760: CPT

## 2021-06-22 PROCEDURE — C1769: CPT

## 2021-06-22 PROCEDURE — 93005 ELECTROCARDIOGRAM TRACING: CPT

## 2021-06-22 PROCEDURE — 85027 COMPLETE CBC AUTOMATED: CPT

## 2021-06-22 PROCEDURE — C1714: CPT

## 2021-06-22 PROCEDURE — 82553 CREATINE MB FRACTION: CPT

## 2021-06-22 PROCEDURE — 83036 HEMOGLOBIN GLYCOSYLATED A1C: CPT

## 2021-07-12 ENCOUNTER — APPOINTMENT (OUTPATIENT)
Dept: SURGICAL ONCOLOGY | Facility: CLINIC | Age: 56
End: 2021-07-12
Payer: MEDICAID

## 2021-07-12 VITALS
HEART RATE: 80 BPM | HEIGHT: 67 IN | TEMPERATURE: 98.2 F | DIASTOLIC BLOOD PRESSURE: 95 MMHG | BODY MASS INDEX: 22.13 KG/M2 | WEIGHT: 141 LBS | SYSTOLIC BLOOD PRESSURE: 147 MMHG | OXYGEN SATURATION: 98 %

## 2021-07-12 PROCEDURE — 99214 OFFICE O/P EST MOD 30 MIN: CPT

## 2021-07-12 RX ORDER — ERGOCALCIFEROL 1.25 MG/1
1.25 MG CAPSULE, LIQUID FILLED ORAL
Qty: 4 | Refills: 0 | Status: DISCONTINUED | COMMUNITY
Start: 2021-03-31 | End: 2021-07-12

## 2021-07-12 RX ORDER — PEG-3350, SODIUM SULFATE, SODIUM CHLORIDE, POTASSIUM CHLORIDE, SODIUM ASCORBATE AND ASCORBIC ACID 7.5-2.691G
100 KIT ORAL
Qty: 1 | Refills: 0 | Status: DISCONTINUED | COMMUNITY
Start: 2020-10-16 | End: 2021-07-12

## 2021-07-12 RX ORDER — LOSARTAN POTASSIUM 100 MG/1
100 TABLET, FILM COATED ORAL
Refills: 0 | Status: DISCONTINUED | COMMUNITY
End: 2021-07-12

## 2021-07-12 RX ORDER — NEOMYCIN SULFATE 500 MG/1
500 TABLET ORAL
Qty: 6 | Refills: 0 | Status: DISCONTINUED | COMMUNITY
Start: 2020-10-16 | End: 2021-07-12

## 2021-07-12 RX ORDER — CILOSTAZOL 100 MG/1
100 TABLET ORAL
Qty: 60 | Refills: 0 | Status: DISCONTINUED | COMMUNITY
Start: 2021-04-06 | End: 2021-07-12

## 2021-07-12 RX ORDER — PEG-3350, SODIUM SULFATE, SODIUM CHLORIDE, POTASSIUM CHLORIDE, SODIUM ASCORBATE AND ASCORBIC ACID 7.5-2.691G
100 KIT ORAL
Qty: 1 | Refills: 0 | Status: DISCONTINUED | COMMUNITY
Start: 2020-11-04 | End: 2021-07-12

## 2021-07-12 RX ORDER — POLYETHYLENE GLYCOL-3350 AND ELECTROLYTES WITH FLAVOR PACK 240; 5.84; 2.98; 6.72; 22.72 G/278.26G; G/278.26G; G/278.26G; G/278.26G; G/278.26G
240 POWDER, FOR SOLUTION ORAL
Qty: 4000 | Refills: 0 | Status: DISCONTINUED | COMMUNITY
Start: 2020-11-04 | End: 2021-07-12

## 2021-07-12 RX ORDER — METRONIDAZOLE 250 MG/1
250 TABLET ORAL
Qty: 3 | Refills: 0 | Status: DISCONTINUED | COMMUNITY
Start: 2020-10-16 | End: 2021-07-12

## 2021-07-12 RX ORDER — POTASSIUM CHLORIDE 1500 MG/1
20 TABLET, FILM COATED, EXTENDED RELEASE ORAL
Qty: 4 | Refills: 0 | Status: DISCONTINUED | COMMUNITY
Start: 2020-10-16 | End: 2021-07-12

## 2021-07-12 RX ORDER — SODIUM SULFATE, POTASSIUM SULFATE, MAGNESIUM SULFATE 17.5; 3.13; 1.6 G/ML; G/ML; G/ML
17.5-3.13-1.6 SOLUTION, CONCENTRATE ORAL
Qty: 1 | Refills: 0 | Status: DISCONTINUED | COMMUNITY
Start: 2020-11-13 | End: 2021-07-12

## 2021-07-12 RX ORDER — POLYETHYLENE GLYCOL 3350 AND ELECTROLYTES WITH LEMON FLAVOR 236; 22.74; 6.74; 5.86; 2.97 G/4L; G/4L; G/4L; G/4L; G/4L
236 POWDER, FOR SOLUTION ORAL
Qty: 1 | Refills: 0 | Status: DISCONTINUED | COMMUNITY
Start: 2020-10-28 | End: 2021-07-12

## 2021-07-12 NOTE — ASSESSMENT
[FreeTextEntry1] : 55 y.o. man with recent episode of acute pancreatitis.\par The following studies were obtained as inpatient at 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 Diffuse pancreatic edema without necrosis. Homogeneous peripancreatic fluid extending into the mesentery and right upper quadrant consistent with acute interstitial pancreatitis.\par \par [id number] MRI: \par Acute diffuse interstitial pancreatitis. No evidence of pancreatic necrosis. No choledocholithiasis. No cholelithiasis. The main pancreatic duct drains via the major and minor papillae. PD up to 0.33 cm-mildly dilated. No pancreatic tumor.\par \par Based on the results of MRI, I believe chances of pancreatitis secondary to periampullary mass are quite low.\par The patient denies any h/o ETOH use/abuse.\par Most recent studies, as well as prior studies including RUQ US did not demonstrate gallstones.\par MRCP did not demonstrate anatomic anomalies.\par \par Mr. Saab still has recurrent episodes of pain, which do not appear to be related to meals.\par We will obtain a RUQ US to confirm no gallstones or sludge. In presence of stones or sludge will advise laparoscopic cholecystectomy.\par We will also order labs to r/o hypercalcemia, hyperlipidemia, autoimmune pancreatitis.\par In case of negative RUQ US will consider EUS to visualize papilla and potentially detect gallstone disease.\par \par Should every test be negative, we may still consider cholecystectomy, The use of empiric cholecystectomy for idiopathic pancreatitis (IP) is supported by both the high prevalence of occult microlithiasis (bile crystals) in patients with IP and by the high false-negative rate of bile crystal analysis. Two prospective studies found a high prevalence of microlithiasis (65% to 85%) in patients with IP and those who underwent therapy with cholecystectomy had no further attacks. A number of retrospective series, have found occult gallstones or microlithiasis to be present in 37%–89% of patients with recurrent IP.

## 2021-07-12 NOTE — PHYSICAL EXAM
[Normal Neck Lymph Nodes] : normal neck lymph nodes  [Normal Supraclavicular Lymph Nodes] : normal supraclavicular lymph nodes [Normal] : oriented to person, place and time, with appropriate affect [de-identified] : A [de-identified] : S1,S2, regular rate and rhythm. No murmurs heard. [de-identified] : Clear throughout. No wheezes heard. [de-identified] : Warm, dry

## 2021-07-12 NOTE — HISTORY OF PRESENT ILLNESS
[de-identified] : Patient Name: JAEL LYN \par MRN: [id number] \par [place] MRN: [id number] \par Referring Provider: none \par Oncologist:\par Date: 7/12/21\par \par Diagnosis: Pancreatitis\par \par 55 year male  presents for evaluation of pancreatitis. He was recently admitted to [facility] on 5/17/21 from his cardiologist (Dr. Loza) for weakness. During his stay, which included a cardiac workup including a cardiac cath on 5/17/21, he developed abdominal pain and vomiting. Abdominal Xray revealed a possible small bowel ileus versus small bowel obstruction. A CT AP revealed mild ascites with fluid around the pancreas. MRI done showing acute pancreatitis. His amylase and lipase were also elevated. His pain was managed and was advised to follow up as outpatient. \par \par Currently, Mr. LYN denies abdominal pain and discomfort but occasionally has a quick sharp pain in the epigastric region, denies having decreased appetite, and denies nausea or vomiting. He denies changes in bowel habits and denies recent unintentional weight loss. He denies fevers, chills, or night sweats.\par \par Functional Status: Mr. LYN is able to exercise without fatigue or dyspnea.\par

## 2021-07-12 NOTE — RESULTS/DATA
[FreeTextEntry1] : Diagnostic Studies\par \par Date: 5/19/21\par Study: MRI Abdomen W ([facility])\par Results:INTERPRETATION:  Attending over read. Agree with the below report with following modifications. Acute diffuse interstitial pancreatitis.. No evidence of pancreatic necrosis. No choledocholithiasis. No cholelithiasis. The main pancreatic duct drains via the major and minor papillae. Pancreatic duct diameter measures up to 0.33 cm-mildly dilated. No pancreatic tumor. Small amount of ascites. Splenic vein intact. The calcifications in relation to the pancreatico duodenal groove/lateral pancreatic head cannot be appreciated on this MRI.\par \par IMPRESSION:\par 1. Acute pancreatitis.\par 2. Irregular focus of hypoenhancement in the head of the pancreas where there are pancreatic\par calcifications, as seen on CT. Consider outpatient ERCP to exclude neoplasm. Defer to on-site\par radiologist for ultimate follow-up recommendations.\par Thank you for allowing us to participate in the care of your patient.\par Dictated and Authenticated by: Bryce Bush MD\par 05/20/2021 12:53 AM Eastern Time ([place] & [place])\par \par The above report was submitted by the [facility] attending radiologist and copied to PowerScribe by resident Dr. 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 Diffuse pancreatic edema without necrosis. Homogeneous peripancreatic fluid extending into the mesentery and right upper quadrant consistent with acute interstitial pancreatitis. After cessation of acute symptoms follow-up contrast enhanced MRI pancreas is recommended.\par \par Small ascites. Status post sigmoid resection with unremarkable sigmoid anastomosis. No evidence of bowel obstruction. Mild upstream colonic distention large amount of stool in the colon.\par \par IMPRESSION:\par 1. Mild ascites with the dominant portion of fluid seen around and engorged pancreas. Please\par correlate for acute pancreatitis.\par 2. Focus of hypoattenuation in the pancreatic head which may be related to necrosis. Neoplasm is\par not excluded.\par 3. Calcifications in the pancreatic head/uncinate process, compatible with chronic pancreatitis.\par Thank you for allowing us to participate in the care of your patient.\par Dictated and Authenticated by: Bryce Bush MD\par 05/18/2021 10:21 PM Eastern Time ([place] & [place])\par \par The above report was submitted by the [facility] attending radiologist and copied to PowerScribe by resident Dr. Vasques.\par \par \par Labs:\par Date: 5/20/21\par Results: H/H 11.1/32.4, Lipase 1875, Amylase 1139

## 2021-07-15 ENCOUNTER — APPOINTMENT (OUTPATIENT)
Dept: ULTRASOUND IMAGING | Facility: HOSPITAL | Age: 56
End: 2021-07-15
Payer: MEDICAID

## 2021-07-15 ENCOUNTER — OUTPATIENT (OUTPATIENT)
Dept: OUTPATIENT SERVICES | Facility: HOSPITAL | Age: 56
LOS: 1 days | End: 2021-07-15
Payer: MEDICAID

## 2021-07-15 DIAGNOSIS — Z90.49 ACQUIRED ABSENCE OF OTHER SPECIFIED PARTS OF DIGESTIVE TRACT: Chronic | ICD-10-CM

## 2021-07-15 PROCEDURE — 76705 ECHO EXAM OF ABDOMEN: CPT | Mod: 26

## 2021-07-15 PROCEDURE — 76705 ECHO EXAM OF ABDOMEN: CPT

## 2021-07-16 ENCOUNTER — APPOINTMENT (OUTPATIENT)
Dept: UROLOGY | Facility: CLINIC | Age: 56
End: 2021-07-16
Payer: MEDICAID

## 2021-07-16 VITALS
HEART RATE: 85 BPM | BODY MASS INDEX: 22.91 KG/M2 | SYSTOLIC BLOOD PRESSURE: 130 MMHG | RESPIRATION RATE: 15 BRPM | WEIGHT: 146 LBS | DIASTOLIC BLOOD PRESSURE: 87 MMHG | TEMPERATURE: 97.4 F | HEIGHT: 67 IN

## 2021-07-16 PROCEDURE — 99213 OFFICE O/P EST LOW 20 MIN: CPT

## 2021-07-16 NOTE — HISTORY OF PRESENT ILLNESS
[FreeTextEntry1] : Very pleasant 55-year-old gentleman presents for follow-up of erectile dysfunction.  He feels well.  He reports that Cialis 20 mg is significantly proved his erections.  He reports that he is getting morning erections as well.  He reports that he is again gaining weight and feeling more energetic.  No other complaints.

## 2021-07-16 NOTE — ASSESSMENT
[FreeTextEntry1] : Very pleasant 55-year-old gentleman presents for follow-up of erectile dysfunction\par -Continue Cialis–refill sent to the pharmacy\par -Prior PSA reviewed–0.81\par -Follow-up in 6 months or sooner if necessary

## 2021-07-22 LAB
ALBUMIN SERPL ELPH-MCNC: 4.2 G/DL
ALP BLD-CCNC: 105 U/L
ALT SERPL-CCNC: 10 U/L
AMYLASE/CREAT SERPL: 235 U/L
ANION GAP SERPL CALC-SCNC: 12 MMOL/L
AST SERPL-CCNC: 12 U/L
BASOPHILS # BLD AUTO: 0.06 K/UL
BASOPHILS NFR BLD AUTO: 0.6 %
BILIRUB DIRECT SERPL-MCNC: 0.1 MG/DL
BILIRUB INDIRECT SERPL-MCNC: 0.1 MG/DL
BILIRUB SERPL-MCNC: 0.2 MG/DL
BUN SERPL-MCNC: 22 MG/DL
CALCIUM SERPL-MCNC: 9.6 MG/DL
CHLORIDE SERPL-SCNC: 103 MMOL/L
CHOLEST SERPL-MCNC: 182 MG/DL
CO2 SERPL-SCNC: 24 MMOL/L
CREAT SERPL-MCNC: 1.61 MG/DL
EOSINOPHIL # BLD AUTO: 0.53 K/UL
EOSINOPHIL NFR BLD AUTO: 5 %
GLUCOSE SERPL-MCNC: 92 MG/DL
HCT VFR BLD CALC: 37.5 %
HDLC SERPL-MCNC: 53 MG/DL
HGB BLD-MCNC: 12.7 G/DL
IGG4 SER-MCNC: 15 MG/DL
IMM GRANULOCYTES NFR BLD AUTO: 0.5 %
LDLC SERPL CALC-MCNC: 76 MG/DL
LPL SERPL-CCNC: 110 U/L
LYMPHOCYTES # BLD AUTO: 2.67 K/UL
LYMPHOCYTES NFR BLD AUTO: 25.3 %
MAN DIFF?: NORMAL
MCHC RBC-ENTMCNC: 30.8 PG
MCHC RBC-ENTMCNC: 33.9 GM/DL
MCV RBC AUTO: 91 FL
MONOCYTES # BLD AUTO: 1.13 K/UL
MONOCYTES NFR BLD AUTO: 10.7 %
NEUTROPHILS # BLD AUTO: 6.1 K/UL
NEUTROPHILS NFR BLD AUTO: 57.9 %
NONHDLC SERPL-MCNC: 130 MG/DL
PLATELET # BLD AUTO: 305 K/UL
POTASSIUM SERPL-SCNC: 4.3 MMOL/L
PROT SERPL-MCNC: 6.7 G/DL
RBC # BLD: 4.12 M/UL
RBC # FLD: 14.6 %
SODIUM SERPL-SCNC: 138 MMOL/L
TRIGL SERPL-MCNC: 268 MG/DL
WBC # FLD AUTO: 10.54 K/UL

## 2021-08-13 ENCOUNTER — LABORATORY RESULT (OUTPATIENT)
Age: 56
End: 2021-08-13

## 2021-08-13 ENCOUNTER — APPOINTMENT (OUTPATIENT)
Dept: DISASTER EMERGENCY | Facility: CLINIC | Age: 56
End: 2021-08-13

## 2021-08-13 ENCOUNTER — APPOINTMENT (OUTPATIENT)
Dept: UROLOGY | Facility: CLINIC | Age: 56
End: 2021-08-13
Payer: MEDICAID

## 2021-08-13 PROCEDURE — 99214 OFFICE O/P EST MOD 30 MIN: CPT

## 2021-08-13 NOTE — HISTORY OF PRESENT ILLNESS
[FreeTextEntry1] : Very pleasant 55-year-old gentleman who presents for follow-up of erectile dysfunction.  He reports that Cialis 20 mg has not been as effective recently as it has been in the past.  He reports that he typically is able to start having sex, however his erections frequently become significantly more flaccid over time.  He does report morning erections at times.  He denies psychological stressors.  No other complaints.

## 2021-08-13 NOTE — ASSESSMENT
[FreeTextEntry1] : Very pleasant 55-year-old gentleman who presents for follow-up of erectile dysfunction\par -We had an extensive discussion regarding possible management options for erectile dysfunction, including PDE 5 inhibitors, ALEXIS, ICI, intraurethral alprostadil, penile prosthesis\par -After a thorough discussion of the risks and benefits of the aforementioned options he would like to try a trial of a different PDE 5 inhibitor\par -Trial of sildenafil 100 mg\par -I discussed the risks, benefits, alternatives, and possible side effects of Viagra (sildenafil) therapy with the patient, including but not limited to headache, flushing, upset stomach, blurry vision, change in color vision, vision loss, and priapism with the patient.\par -FSH\par -LH\par -Prolactin\par -Testosterone testing\par -Follow-up in 1 month

## 2021-08-15 LAB
FSH SERPL-MCNC: 3 IU/L
LH SERPL-ACNC: 6.2 IU/L
PROLACTIN SERPL-MCNC: 12.5 NG/ML

## 2021-08-17 LAB
TESTOST BND SERPL-MCNC: 8.2 PG/ML
TESTOSTERONE TOTAL S: 357 NG/DL

## 2021-09-14 ENCOUNTER — APPOINTMENT (OUTPATIENT)
Dept: UROLOGY | Facility: CLINIC | Age: 56
End: 2021-09-14
Payer: MEDICAID

## 2021-09-14 PROCEDURE — 99214 OFFICE O/P EST MOD 30 MIN: CPT

## 2021-09-14 RX ORDER — TADALAFIL 20 MG/1
20 TABLET ORAL
Qty: 90 | Refills: 0 | Status: DISCONTINUED | COMMUNITY
Start: 2021-03-12 | End: 2021-09-14

## 2021-09-14 NOTE — ASSESSMENT
[FreeTextEntry1] : Very pleasant 56 year old gentleman who presents for follow up of erectile dysfunction\par -Refill for sildenafil 100 mg sent to the pharmacy\par -testosterone reviewed- total 357, free 8.2\par -LH 6.2\par -FSH 3.0\par -prolactin 12.5\par -f/u in 6 months

## 2021-09-14 NOTE — HISTORY OF PRESENT ILLNESS
[FreeTextEntry1] : Very pleasant 56-year-old gentleman who presents for follow-up of erectile dysfunction.  He reports that Cialis 20 mg has not been as effective recently as it has been in the past. Recently tried sildenafil 100 mg and reports a significant improvement in his erections on this. He is happy with his erections at this point.No other complaints.

## 2021-10-05 ENCOUNTER — APPOINTMENT (OUTPATIENT)
Age: 56
End: 2021-10-05

## 2021-11-08 ENCOUNTER — APPOINTMENT (OUTPATIENT)
Age: 56
End: 2021-11-08
Payer: MEDICAID

## 2021-11-08 VITALS
OXYGEN SATURATION: 98 % | TEMPERATURE: 97 F | RESPIRATION RATE: 16 BRPM | WEIGHT: 143 LBS | SYSTOLIC BLOOD PRESSURE: 138 MMHG | BODY MASS INDEX: 22.44 KG/M2 | HEART RATE: 92 BPM | DIASTOLIC BLOOD PRESSURE: 90 MMHG | HEIGHT: 67 IN

## 2021-11-08 DIAGNOSIS — K63.89 OTHER SPECIFIED DISEASES OF INTESTINE: ICD-10-CM

## 2021-11-08 PROCEDURE — 99204 OFFICE O/P NEW MOD 45 MIN: CPT

## 2021-11-08 RX ORDER — CARVEDILOL 3.12 MG/1
TABLET, FILM COATED ORAL
Refills: 0 | Status: DISCONTINUED | COMMUNITY
End: 2021-11-08

## 2021-11-08 RX ORDER — FAMOTIDINE 40 MG/1
40 TABLET, FILM COATED ORAL
Qty: 30 | Refills: 0 | Status: DISCONTINUED | COMMUNITY
Start: 2021-04-06 | End: 2021-11-08

## 2021-11-08 RX ORDER — LOSARTAN POTASSIUM 50 MG/1
50 TABLET, FILM COATED ORAL
Qty: 30 | Refills: 0 | Status: DISCONTINUED | COMMUNITY
Start: 2021-03-31 | End: 2021-11-08

## 2021-11-08 RX ORDER — ASPIRIN 325 MG/1
TABLET, FILM COATED ORAL
Refills: 0 | Status: DISCONTINUED | COMMUNITY
End: 2021-11-08

## 2021-11-08 RX ORDER — CHLORTHALIDONE 25 MG/1
25 TABLET ORAL
Refills: 0 | Status: DISCONTINUED | COMMUNITY
End: 2021-11-08

## 2021-11-10 NOTE — PHYSICAL EXAM
[General Appearance - Alert] : alert [General Appearance - In No Acute Distress] : in no acute distress [General Appearance - Well Nourished] : well nourished [Sclera] : the sclera and conjunctiva were normal [Outer Ear] : the ears and nose were normal in appearance [Neck Appearance] : the appearance of the neck was normal [Heart Rate And Rhythm] : heart rate was normal and rhythm regular [Edema] : there was no peripheral edema [Bowel Sounds] : normal bowel sounds [Abdomen Soft] : soft [Abdomen Tenderness] : non-tender [Abnormal Walk] : normal gait [Skin Color & Pigmentation] : normal skin color and pigmentation [Skin Turgor] : normal skin turgor [] : no rash [No Focal Deficits] : no focal deficits [Oriented To Time, Place, And Person] : oriented to person, place, and time [Impaired Insight] : insight and judgment were intact [Affect] : the affect was normal

## 2021-11-12 PROBLEM — K63.89 MASS OF COLON: Status: ACTIVE | Noted: 2020-10-07

## 2021-11-12 NOTE — ASSESSMENT
[FreeTextEntry1] : 56M with PMHX HTN  here for evaluation of pancreatitis episode. \par \par History of pancreatitis \par -pt currently asymptomatic however in setting of negative abdominal ultrasound, recommend EGD with EUS to visualize papilla and detect any gallstone disease, r/a/i/b discussed and patient agreeable \par \par Hx of sigmoid mass with high grade dysplasia \par - continue f/u surgeon\par - consider repeat cscope \par \par f/u post procedure

## 2021-11-12 NOTE — HISTORY OF PRESENT ILLNESS
[de-identified] : 56M with PMHX HTN  here for evaluation of pancreatitis episode. He was recently admitted to [facility] on 5/17/21 from his cardiologist (Dr. Loza) for weakness. During his stay, which included a cardiac workup including a cardiac cath on 5/17/21, he developed abdominal pain and vomiting. Abdominal Xray revealed a possible small bowel ileus versus small bowel obstruction. A CT AP revealed mild ascites with fluid around the pancreas. MRI done showing acute pancreatitis. His amylase and lipase were also elevated. He is currently following up as requested. \par \par No GI complaints. Was having constant pain in middle of stomach but now much better. \par \par EGD- September 2020: two small superficial gastric ulcers \par colonoscopy 2020: sigmoid mass later operation to remove mass (November 2020) path showed high grade dysplasia \par \par FHX: no GI cancers 
denies

## 2021-12-10 VITALS — DIASTOLIC BLOOD PRESSURE: 105 MMHG | HEIGHT: 67 IN | SYSTOLIC BLOOD PRESSURE: 140 MMHG | WEIGHT: 149.91 LBS

## 2021-12-10 RX ORDER — CHLORHEXIDINE GLUCONATE 213 G/1000ML
1 SOLUTION TOPICAL ONCE
Refills: 0 | Status: DISCONTINUED | OUTPATIENT
Start: 2021-12-15 | End: 2021-12-16

## 2021-12-10 NOTE — H&P ADULT - HISTORY OF PRESENT ILLNESS
Covid PCR  Pharmacy  Escort    Verify meds    55 y/o M, current smoker, with PMHx of HTN, HFmEF (EF 40-45% by echo 9/2020), known CAD s/p PCI HARJINDER mRCA on 5/10/2021 at [facility] , Colonic adenomatous lesion s/p Sigmoidectomy 11/23/20, who presented to his cardiologist, Dr. Loza for follow-up and has been having weakness and a "rubbery feeling" in his legs while walking 4-5 blocks. Patient denies CP, SOB, dizziness, diaphoresis, LE edema, orthopnea, palpitations, PND, fatigue, n/v/d, syncope, non-healing ulcers, numbness/tingling, color changes, fever, chills, or sick contacts. CTA Aorta w/ LE Runoff (4/22/21) revealing occluded R distal SFA, occluded L SFA, ectatic R common illiac artery and L internal iliac artery, nonvisualization of distal R anterior Tibial (probably occluded). Cardiac Cath 10/26/20: positive FFR 0.7 of mRCA, LM normal, LAD mild luminal, Ramus small vessel mild diffuse, mLCx 30%, dLCx 40%, OM1 normal.  Echocardiogram 9/2020 revealing LVEF 40-45%, moderate concentric LVH, Grade 1 Diastolic Dysfunction, resting regional wall motion abnormalities of the inferoseptal wall.  In light of patient's risk factors, Satish Class III symptoms, and abnormal CTA, patient presents for peripheral angiogram with intervention if clinically indicated.  Covid PCR  Pharmacy  Escort    Verify meds    Incomplete    57 y/o M, current smoker, with PMHx of HTN, HLD, DM, HFmEF (EF 40-45% by echo 9/2020), CVA,  known CAD, prior MI, s/p PCI HARJINDER mRCA on 5/10/2021 at [facility], known PVD s/p cath on 05/17/2021 - Atherectomy/PTCA/HARJINDER x 2 L SFA  prox to distal.  Colonic adenomatous lesion s/p Sigmoidectomy 11/23/20, who presented to his cardiologist, Dr. Loza with c/o severe ([place] 3 symptoms and underwent subsequent peripheral angiogram with stents.  Hospital course significant for severe epigastric abdominal pain and began vomiting prior to discharge, pt diagnosed with Acute Pancreatitis.      Abominal xray 5/18: Mildly distended loops of small bowel. Gas is seen in nondistended colon to the rectosigmoid region. Abbreviated differential includes a small bowel ileus versus a low-grade and/or early small bowel obstruction. Surgery consulted and rec CT A/P w/ PO/IV contrast. CT A/P 5/18: Mild ascites with the dominant portion of fluid seen around and engorged pancreas. Please correlate for acute pancreatitis. Focus of hypoattenuation in the pancreatic head which may be related to necrosis. Neoplasm is  not excluded. Calcifications in the pancreatic head/uncinate process, compatible with chronic pancreatitis. GI consulted and rec MRI w/ contrast pancreatic protocol w/ Amylse / Lipase. Amylase 1139 and Lipase 1875. MRI 5/19/21: Acute pancreatitis. Irregular focus of hypoenhancement in the head of the pancreas where there are pancreatic calcifications, as seen on CT. Consider outpatient ERCP to exclude neoplasm. Medicine consulted for transfer however being that patient's symptoms have nearly resolved and now eating/ambulating, would not recommend transfer or continued admission unless needed by surgery. General surgery signed off as no acute surgical intervention however consulted surgical oncologist Dr. Kaufman. Per surgical oncologist, no inpatient workup but should follow up closely outpatient with plan for outpatient cholecystectomy.        for follow-up and has been having weakness and a "rubbery feeling" in his legs while walking 4-5 blocks. Patient denies CP, SOB, dizziness, diaphoresis, LE edema, orthopnea, palpitations, PND, fatigue, n/v/d, syncope, non-healing ulcers, numbness/tingling, color changes, fever, chills, or sick contacts. CTA Aorta w/ LE Runoff (4/22/21) revealing occluded R distal SFA, occluded L SFA, ectatic R common illiac artery and L internal iliac artery, nonvisualization of distal R anterior Tibial (probably occluded). Cardiac Cath 10/26/20: positive FFR 0.7 of mRCA, LM normal, LAD mild luminal, Ramus small vessel mild diffuse, mLCx 30%, dLCx 40%, OM1 normal.  Echocardiogram 9/2020 revealing LVEF 40-45%, moderate concentric LVH, Grade 1 Diastolic Dysfunction, resting regional wall motion abnormalities of the inferoseptal wall.  In light of patient's risk factors, [place] Class III symptoms, and abnormal CTA, patient presents for peripheral angiogram with intervention if clinically indicated.  Covid PCR  Pharmacy  Escort    Verify meds    [place]LE[place]    57 y/o M, current smoker, with PMHx of HTN, HLD, DM, HFmEF (EF 40-45% by echo 9/2020), CVA,  known CAD, prior MI, s/p Cath @ [facility] 5/10/21:  PCI/HARJINDER mRCA, known PVD s/p Cath @[facility] 5/17/2021:  Atherectomy/PTCA/HARJINDER x 2 L SFA  prox to distal. History of  Colonic adenomatous lesion s/p Sigmoidectomy 11/23/20.  Pt initially presented to his cardiologist, Dr. Loaz with c/o severe (Satish 3 symptoms) and underwent subsequent peripheral angiogram with stent placement. Of note: Pt's hospital course was complicated with severe epigastric abdominal pain, vomiting  and pt was diagnosed with Acute Pancreatitis by CT with subsequent follow-up MRI (see full report), GI consulted, no immediate intervention at that time, pt was recommended ERCP to exclude neoplasm and possible outpatient cholecystectomy.     Pt denies active CP, SOB, palpitations, diaphoresis, orthopnea, PND, dizziness, syncope, abdominal pain/discomfort, LE swelling or any other complaints at this time    CTA Aorta w/ LE Runoff (4/22/21) revealing occluded R distal SFA, occluded L SFA, ectatic R common illiac artery and L internal iliac artery, nonvisualization of distal R anterior Tibial (probably occluded). Cardiac Cath 10/26/20: positive FFR 0.7 of mRCA, LM normal, LAD mild luminal, Ramus small vessel mild diffuse, mLCx 30%, dLCx 40%, OM1 normal.  Echocardiogram 9/2020 revealing LVEF 40-45%, moderate concentric LVH, Grade 1 Diastolic Dysfunction, resting regional wall motion abnormalities of the inferoseptal wall.    In light of patient's risk factors, Satish Class ****symptoms, and abnormal CTA, patient presents for peripheral angiogram with intervention if clinically indicated.  Covid PCR: 12/13/2021 [place]   [facility]  Escort: Wife    55 y/o poor historian male, current smoker, with PMHx of HTN, HLD, DM, HFmEF (EF 40-45% by echo 9/2020), CVA, known CAD, prior MI s/p PCI/HARJINDER mRCA x 5/10/221 @ [facility], known PVD s/p   Atherectomy/PTCA/HARJINDER x 2 L SFA  prox to distal [facility]17[facility], h/o colonic adenomatous lesion s/p Sigmoidectomy x 11/23/20.  Pt initially presented to his cardiologist, Dr. Loza c/o severe ([place] 3 symptoms) and underwent subsequent peripheral angiogram with stent placement.     Pt denies active CP, SOB, palpitations, diaphoresis, orthopnea, PND, dizziness, syncope, abdominal pain/discomfort, LE swelling or any other complaints at this time    CTA Aorta w/ LE Runoff (4/22/21) revealing occluded R distal SFA, occluded L SFA, ectatic R common illiac artery and L internal iliac artery, nonvisualization of distal R anterior Tibial (probably occluded). Cardiac Cath 10/26/20: positive FFR 0.7 of mRCA, LM normal, LAD mild luminal, Ramus small vessel mild diffuse, mLCx 30%, dLCx 40%, OM1 normal.  Echocardiogram 9/2020 revealing LVEF 40-45%, moderate concentric LVH, Grade 1 Diastolic Dysfunction, resting regional wall motion abnormalities of the inferoseptal wall.    In light of patient's risk factors, [place] Class ****symptoms, and abnormal CTA, patient presents for peripheral angiogram with intervention if clinically indicated.  Covid PCR: 12/13/2021 [place]   [facility] ([place] [facility] [place])  Escort: Wife    57 y/o poor historian male, current smoker, with PMHx of HTN, HLD, DM, HFmEF (EF 40-45% by echo 9/2020), CVA, known CAD, prior MI s/p PCI/HARJINDER mRCA x 5/10/221 @ [facility], known PVD s/p Atherectomy/PTCA/HARJINDER x 2 L SFA  prox to distal [facility], h/o colonic adenomatous lesion s/p Sigmoidectomy x 11/23/20.  Pt initially presented to his cardiologist, Dr. Loza on 5/17/20201 c/o severe ( and underwent subsequent peripheral angiogram with stent placement.     Pt denies active CP, SOB, palpitations, diaphoresis, orthopnea, PND, dizziness, syncope, abdominal pain/discomfort, LE swelling or any other complaints at this time    CTA Aorta w/ LE Runoff (4/22/21) revealing occluded R distal SFA, occluded L SFA, ectatic R common illiac artery and L internal iliac artery, nonvisualization of distal R anterior Tibial (probably occluded). Cardiac Cath 10/26/20: positive FFR 0.7 of mRCA, LM normal, LAD mild luminal, Ramus small vessel mild diffuse, mLCx 30%, dLCx 40%, OM1 normal.  Echocardiogram 9/2020 revealing LVEF 40-45%, moderate concentric LVH, Grade 1 Diastolic Dysfunction, resting regional wall motion abnormalities of the inferoseptal wall.    In light of patient's risk factors, [place] Class ****symptoms, and abnormal CTA, patient presents for peripheral angiogram with intervention if clinically indicated.  Covid PCR: 12/13/2021 [place]   [facility]  Escort: Wife    57 y/o poor historian male, current smoker, with PMHx of HTN, HLD, DM, HFmEF (EF 40-45% by echo 9/2020), CVA, known CAD, prior MI s/p PCI/HARJINDER mRCA x 5/10/221 @ [facility], known PVD s/p Atherectomy/PTCA/HARJINDER x 2 L SFA  prox [facility] distal [facility], h/o colonic adenomatous lesion s/p Sigmoidectomy x 11/23/20. Pt initially presented to his cardiologist, Dr. Loza on 5/17/20201 c/o weakness and "rubbery feeling" in his legs while walking 4-5 blocks and underwent subsequent staged peripheral angiogram with stent placement c/b pancreatitis. Now pt is c/o numbness in his calf with walking and relieved with rest. Pt denies active CP, SOB, palpitations, diaphoresis, orthopnea, PND, dizziness, syncope. Denies any discoloration, open wound or ulcer.      CTA Aorta w/ LE Runoff (4/22/21) revealing occluded R distal SFA, occluded L SFA, ectatic R common illiac artery and L internal iliac artery, nonvisualization of distal R anterior Tibial (probably occluded). Cardiac Cath 10/26/20: positive FFR 0.7 of mRCA, LM normal, LAD mild luminal, Ramus small vessel mild diffuse, mLCx 30%, dLCx 40%, OM1 normal.  Echocardiogram 9/2020 revealing LVEF 40-45%, moderate concentric LVH, Grade 1 Diastolic Dysfunction, resting regional wall motion abnormalities of the inferoseptal wall.    In light of patient's risk factors, Satish Class III symptoms, and abnormal CTA, patient presents for peripheral angiogram with intervention if clinically indicated.  Covid PCR: 12/13/2021 [place]   [facility] [facility] [employer]  Escort: Wife    VERIFY MEDS ON ARRIVAL    57 y/o male, poor historian current smoker, with PMHx of HTN, HLD, DM-2, HFmREF (EF 40-45% by echo 9/2020), CVA, CAD s/p HARJINDER mRCA 5/10/2021 @ [facility], PVD s/p Atherectomy/PTA/HARJINDER x2 L SFA  5/17/2021 @ [facility], h/o colonic adenomatous lesion s/p Sigmoidectomy x 11/23/20, initially presented to cardiologist, Dr. Loza on 5/17/20201 c/o weakness and "rubbery feeling" in his legs while walking 4-5 blocks and underwent subsequent HARJINDER L SFA, hospital course c/b pancreatitis. Now pt is c/o numbness in his calf bilaterally with walking and relieved with rest. Pt denies active CP, SOB, palpitations, diaphoresis, orthopnea, PND, dizziness, syncope. Denies any discoloration, open wound or ulcer.  Echocardiogram 9/2020 revealing LVEF 40-45%, moderate concentric LVH, Grade 1 Diastolic Dysfunction, resting regional wall motion abnormalities of the inferoseptal wall.    In light of patient's risk factors, Satish Class III symptoms, and PAD with residual RSFA , patient presents for staged PTA.     Peripheral angiogram 5/17/21: Atherectomy/PTA/HARJINDER x 2 L SFA . Residual RSFA    Covid PCR: 12/13/2021 [place]   [facility] and [place]  Escort: Wife    NON COMPLAINT WITH MEDS (STOPPED ALL MEDS ON HIS OWN)    55 y/o male, poor historian current smoker, NON COMPLAINT WITH MEDS with PMHx of HTN, HLD, DM-2, HFmREF (EF 40-45% by echo 9/2020), CVA, CAD s/p HARJINDER mRCA 5/10/2021 @ [facility], PVD s/p Atherectomy/PTA/HARJINDER x2 L SFA  5/17/2021 @ [facility], h/o colonic adenomatous lesion s/p Sigmoidectomy x 11/23/20, initially presented to cardiologist, Dr. Loza on 5/17/20201 c/o weakness and "rubbery feeling" in his legs while walking 4-5 blocks and underwent subsequent HARJINDER L SFA, hospital course c/b pancreatitis. Now pt is c/o numbness in his calf bilaterally with walking and relieved with rest. Pt denies active CP, SOB, palpitations, diaphoresis, orthopnea, PND, dizziness, syncope. Denies any discoloration, open wound or ulcer.  Echocardiogram 9/2020 revealing LVEF 40-45%, moderate concentric LVH, Grade 1 Diastolic Dysfunction, resting regional wall motion abnormalities of the inferoseptal wall.    In light of patient's risk factors, Satish Class III symptoms, and PAD with residual RSFA , patient presents for staged PTA.     Peripheral angiogram 5/17/21: Atherectomy/PTA/HARJINDER x 2 L SFA . Residual RSFA

## 2021-12-10 NOTE — H&P ADULT - ASSESSMENT
55 y/o male, poor historian current smoker, NON COMPLAINT WITH MEDS with PMHx of HTN, HLD, DM-2, HFmREF (EF 40-45% by echo 9/2020), CVA, CAD s/p HARJINDER mRCA 5/10/2021 @ [facility], PVD s/p Atherectomy/PTA/HARJINDER x2 L SFA  5/17/2021 @ [facility], h/o colonic adenomatous lesion s/p Sigmoidectomy x 11/23/20, initially presented to cardiologist, Dr. Loza on 5/17/20201 c/o weakness and "rubbery feeling" in his legs while walking 4-5 blocks and underwent subsequent HARJINDER L SFA, hospital course c/b pancreatitis. Now pt is c/o numbness in his calf bilaterally with walking and relieved with rest.  In light of patient's risk factors, [place] Class III symptoms, and PAD with residual RSFA , patient presents for staged PTA.     -H/H stable, no active bleeding, loaded with aspirin 325 mg x1 and plavix 600 mg x1.   -EF 40-45% by ECHO, euvolemic on exam, Pre cath fluids with 3 ml/kg/hr with frequent lung checks.     Risks & benefits of procedure and alternative therapy have been explained to the patient including but not limited to: allergic reaction, bleeding w/possible need for blood transfusion, infection, renal and vascular compromise, limb damage, stroke, Myocardial infarction, vessel dissection/perforation, emergent Vascular Surgery. Informed consent obtained and in chart. 55 y/o male, poor historian current smoker, NON COMPLAINT WITH MEDS with PMHx of HTN, HLD, DM-2, HFmREF (EF 40-45% by echo 9/2020), CVA, CAD s/p HARJINDER mRCA 5/10/2021 @ [facility], PVD s/p Atherectomy/PTA/HARJINDER x2 L SFA  5/17/2021 @ [facility], h/o colonic adenomatous lesion s/p Sigmoidectomy x 11/23/20, initially presented to cardiologist, Dr. Loza on 5/17/20201 c/o weakness and "rubbery feeling" in his legs while walking 4-5 blocks and underwent subsequent HARJINDER L SFA, hospital course c/b pancreatitis. Now pt is c/o numbness in his calf bilaterally with walking and relieved with rest.  In light of patient's risk factors, [place] Class III symptoms, and PAD with residual RSFA , patient presents for staged PTA.     -H/H stable, no active bleeding, loaded with aspirin 325 mg x1 and plavix 600 mg x1.   -EF 40-45% by ECHO, euvolemic on exam, Pre cath fluids with 3 ml/kg/hr with frequent lung checks.   -reinforced compliance with DAPT therapy multiple times. Please continue to reinforce post cath.     Risks & benefits of procedure and alternative therapy have been explained to the patient including but not limited to: allergic reaction, bleeding w/possible need for blood transfusion, infection, renal and vascular compromise, limb damage, stroke, Myocardial infarction, vessel dissection/perforation, emergent Vascular Surgery. Informed consent obtained and in chart.

## 2021-12-10 NOTE — H&P ADULT - NSICDXPASTMEDICALHX_GEN_ALL_CORE_FT
PAST MEDICAL HISTORY:  CAD (coronary artery disease)     Colonic mass     HTN (hypertension)     Ingrown toenail     Peripheral artery disease     Smoker     Type 2 diabetes mellitus

## 2021-12-15 ENCOUNTER — INPATIENT (INPATIENT)
Facility: HOSPITAL | Age: 56
LOS: 0 days | Discharge: HOME | DRG: 249 | End: 2021-12-16
Attending: INTERNAL MEDICINE | Admitting: INTERNAL MEDICINE
Payer: MEDICAID

## 2021-12-15 DIAGNOSIS — Z86.73 PERSONAL HISTORY OF TRANSIENT ISCHEMIC ATTACK (TIA), AND CEREBRAL INFARCTION WITHOUT RESIDUAL DEFICITS: ICD-10-CM

## 2021-12-15 DIAGNOSIS — I11.0 HYPERTENSIVE HEART DISEASE WITH HEART FAILURE: ICD-10-CM

## 2021-12-15 DIAGNOSIS — E78.5 HYPERLIPIDEMIA, UNSPECIFIED: ICD-10-CM

## 2021-12-15 DIAGNOSIS — I50.20 UNSPECIFIED SYSTOLIC (CONGESTIVE) HEART FAILURE: ICD-10-CM

## 2021-12-15 DIAGNOSIS — Z90.49 ACQUIRED ABSENCE OF OTHER SPECIFIED PARTS OF DIGESTIVE TRACT: Chronic | ICD-10-CM

## 2021-12-15 DIAGNOSIS — F17.210 NICOTINE DEPENDENCE, CIGARETTES, UNCOMPLICATED: ICD-10-CM

## 2021-12-15 DIAGNOSIS — I25.10 ATHEROSCLEROTIC HEART DISEASE OF NATIVE CORONARY ARTERY WITHOUT ANGINA PECTORIS: ICD-10-CM

## 2021-12-15 DIAGNOSIS — E11.51 TYPE 2 DIABETES MELLITUS WITH DIABETIC PERIPHERAL ANGIOPATHY WITHOUT GANGRENE: ICD-10-CM

## 2021-12-15 DIAGNOSIS — I73.9 PERIPHERAL VASCULAR DISEASE, UNSPECIFIED: ICD-10-CM

## 2021-12-15 DIAGNOSIS — Z91.14 PATIENT'S OTHER NONCOMPLIANCE WITH MEDICATION REGIMEN: ICD-10-CM

## 2021-12-15 DIAGNOSIS — Z95.5 PRESENCE OF CORONARY ANGIOPLASTY IMPLANT AND GRAFT: ICD-10-CM

## 2021-12-15 LAB
A1C WITH ESTIMATED AVERAGE GLUCOSE RESULT: 5.1 % — SIGNIFICANT CHANGE UP (ref 4–5.6)
ALBUMIN SERPL ELPH-MCNC: 4.5 G/DL — SIGNIFICANT CHANGE UP (ref 3.3–5)
ALP SERPL-CCNC: 118 U/L — SIGNIFICANT CHANGE UP (ref 40–120)
ALT FLD-CCNC: 15 U/L — SIGNIFICANT CHANGE UP (ref 10–45)
ANION GAP SERPL CALC-SCNC: 10 MMOL/L — SIGNIFICANT CHANGE UP (ref 5–17)
APTT BLD: 47.3 SEC — HIGH (ref 27.5–35.5)
AST SERPL-CCNC: 17 U/L — SIGNIFICANT CHANGE UP (ref 10–40)
BASOPHILS # BLD AUTO: 0.04 K/UL — SIGNIFICANT CHANGE UP (ref 0–0.2)
BASOPHILS NFR BLD AUTO: 0.5 % — SIGNIFICANT CHANGE UP (ref 0–2)
BILIRUB SERPL-MCNC: 0.3 MG/DL — SIGNIFICANT CHANGE UP (ref 0.2–1.2)
BUN SERPL-MCNC: 11 MG/DL — SIGNIFICANT CHANGE UP (ref 7–23)
CALCIUM SERPL-MCNC: 9 MG/DL — SIGNIFICANT CHANGE UP (ref 8.4–10.5)
CHLORIDE SERPL-SCNC: 108 MMOL/L — SIGNIFICANT CHANGE UP (ref 96–108)
CHOLEST SERPL-MCNC: 200 MG/DL — HIGH
CO2 SERPL-SCNC: 22 MMOL/L — SIGNIFICANT CHANGE UP (ref 22–31)
CREAT SERPL-MCNC: 1.33 MG/DL — HIGH (ref 0.5–1.3)
EOSINOPHIL # BLD AUTO: 0.38 K/UL — SIGNIFICANT CHANGE UP (ref 0–0.5)
EOSINOPHIL NFR BLD AUTO: 5 % — SIGNIFICANT CHANGE UP (ref 0–6)
ESTIMATED AVERAGE GLUCOSE: 100 MG/DL — SIGNIFICANT CHANGE UP (ref 68–114)
GLUCOSE SERPL-MCNC: 100 MG/DL — HIGH (ref 70–99)
HCT VFR BLD CALC: 39.4 % — SIGNIFICANT CHANGE UP (ref 39–50)
HDLC SERPL-MCNC: 64 MG/DL — SIGNIFICANT CHANGE UP
HGB BLD-MCNC: 13.3 G/DL — SIGNIFICANT CHANGE UP (ref 13–17)
IMM GRANULOCYTES NFR BLD AUTO: 0.3 % — SIGNIFICANT CHANGE UP (ref 0–1.5)
INR BLD: 0.9 — SIGNIFICANT CHANGE UP (ref 0.88–1.16)
LIPID PNL WITH DIRECT LDL SERPL: 86 MG/DL — SIGNIFICANT CHANGE UP
LYMPHOCYTES # BLD AUTO: 1.87 K/UL — SIGNIFICANT CHANGE UP (ref 1–3.3)
LYMPHOCYTES # BLD AUTO: 24.4 % — SIGNIFICANT CHANGE UP (ref 13–44)
MAGNESIUM SERPL-MCNC: 1.9 MG/DL — SIGNIFICANT CHANGE UP (ref 1.6–2.6)
MCHC RBC-ENTMCNC: 29.6 PG — SIGNIFICANT CHANGE UP (ref 27–34)
MCHC RBC-ENTMCNC: 33.8 GM/DL — SIGNIFICANT CHANGE UP (ref 32–36)
MCV RBC AUTO: 87.8 FL — SIGNIFICANT CHANGE UP (ref 80–100)
MONOCYTES # BLD AUTO: 0.86 K/UL — SIGNIFICANT CHANGE UP (ref 0–0.9)
MONOCYTES NFR BLD AUTO: 11.2 % — SIGNIFICANT CHANGE UP (ref 2–14)
NEUTROPHILS # BLD AUTO: 4.5 K/UL — SIGNIFICANT CHANGE UP (ref 1.8–7.4)
NEUTROPHILS NFR BLD AUTO: 58.6 % — SIGNIFICANT CHANGE UP (ref 43–77)
NON HDL CHOLESTEROL: 136 MG/DL — HIGH
NRBC # BLD: 0 /100 WBCS — SIGNIFICANT CHANGE UP (ref 0–0)
PLATELET # BLD AUTO: 290 K/UL — SIGNIFICANT CHANGE UP (ref 150–400)
POTASSIUM SERPL-MCNC: 4.2 MMOL/L — SIGNIFICANT CHANGE UP (ref 3.5–5.3)
POTASSIUM SERPL-SCNC: 4.2 MMOL/L — SIGNIFICANT CHANGE UP (ref 3.5–5.3)
PROT SERPL-MCNC: 7.3 G/DL — SIGNIFICANT CHANGE UP (ref 6–8.3)
PROTHROM AB SERPL-ACNC: 10.9 SEC — SIGNIFICANT CHANGE UP (ref 10.6–13.6)
RBC # BLD: 4.49 M/UL — SIGNIFICANT CHANGE UP (ref 4.2–5.8)
RBC # FLD: 14.3 % — SIGNIFICANT CHANGE UP (ref 10.3–14.5)
SODIUM SERPL-SCNC: 140 MMOL/L — SIGNIFICANT CHANGE UP (ref 135–145)
TRIGL SERPL-MCNC: 248 MG/DL — HIGH
WBC # BLD: 7.67 K/UL — SIGNIFICANT CHANGE UP (ref 3.8–10.5)
WBC # FLD AUTO: 7.67 K/UL — SIGNIFICANT CHANGE UP (ref 3.8–10.5)

## 2021-12-15 PROCEDURE — 93010 ELECTROCARDIOGRAM REPORT: CPT

## 2021-12-15 PROCEDURE — 75716 ARTERY X-RAYS ARMS/LEGS: CPT | Mod: 26,59

## 2021-12-15 PROCEDURE — 37226: CPT

## 2021-12-15 RX ORDER — CARVEDILOL PHOSPHATE 80 MG/1
1 CAPSULE, EXTENDED RELEASE ORAL
Qty: 0 | Refills: 0 | DISCHARGE

## 2021-12-15 RX ORDER — ATORVASTATIN CALCIUM 80 MG/1
40 TABLET, FILM COATED ORAL AT BEDTIME
Refills: 0 | Status: DISCONTINUED | OUTPATIENT
Start: 2021-12-15 | End: 2021-12-16

## 2021-12-15 RX ORDER — ASPIRIN/CALCIUM CARB/MAGNESIUM 324 MG
81 TABLET ORAL DAILY
Refills: 0 | Status: DISCONTINUED | OUTPATIENT
Start: 2021-12-15 | End: 2021-12-16

## 2021-12-15 RX ORDER — CLOPIDOGREL BISULFATE 75 MG/1
600 TABLET, FILM COATED ORAL ONCE
Refills: 0 | Status: COMPLETED | OUTPATIENT
Start: 2021-12-15 | End: 2021-12-15

## 2021-12-15 RX ORDER — ASPIRIN/CALCIUM CARB/MAGNESIUM 324 MG
325 TABLET ORAL ONCE
Refills: 0 | Status: COMPLETED | OUTPATIENT
Start: 2021-12-15 | End: 2021-12-15

## 2021-12-15 RX ORDER — SODIUM CHLORIDE 9 MG/ML
500 INJECTION INTRAMUSCULAR; INTRAVENOUS; SUBCUTANEOUS
Refills: 0 | Status: DISCONTINUED | OUTPATIENT
Start: 2021-12-15 | End: 2021-12-16

## 2021-12-15 RX ORDER — CLOPIDOGREL BISULFATE 75 MG/1
75 TABLET, FILM COATED ORAL DAILY
Refills: 0 | Status: DISCONTINUED | OUTPATIENT
Start: 2021-12-15 | End: 2021-12-16

## 2021-12-15 RX ORDER — INFLUENZA VIRUS VACCINE 15; 15; 15; 15 UG/.5ML; UG/.5ML; UG/.5ML; UG/.5ML
0.5 SUSPENSION INTRAMUSCULAR ONCE
Refills: 0 | Status: COMPLETED | OUTPATIENT
Start: 2021-12-15 | End: 2021-12-15

## 2021-12-15 RX ORDER — ERGOCALCIFEROL 1.25 MG/1
1 CAPSULE ORAL
Qty: 0 | Refills: 0 | DISCHARGE

## 2021-12-15 RX ORDER — LOSARTAN POTASSIUM 100 MG/1
1 TABLET, FILM COATED ORAL
Qty: 0 | Refills: 0 | DISCHARGE

## 2021-12-15 RX ADMIN — Medication 325 MILLIGRAM(S): at 17:08

## 2021-12-15 RX ADMIN — CLOPIDOGREL BISULFATE 600 MILLIGRAM(S): 75 TABLET, FILM COATED ORAL at 17:08

## 2021-12-15 RX ADMIN — SODIUM CHLORIDE 203 MILLILITER(S): 9 INJECTION INTRAMUSCULAR; INTRAVENOUS; SUBCUTANEOUS at 16:00

## 2021-12-15 NOTE — PATIENT PROFILE ADULT - FALL HARM RISK - HARM RISK INTERVENTIONS

## 2021-12-16 ENCOUNTER — TRANSCRIPTION ENCOUNTER (OUTPATIENT)
Age: 56
End: 2021-12-16

## 2021-12-16 VITALS — DIASTOLIC BLOOD PRESSURE: 97 MMHG | HEART RATE: 95 BPM | SYSTOLIC BLOOD PRESSURE: 156 MMHG

## 2021-12-16 LAB
ALBUMIN SERPL ELPH-MCNC: 3.7 G/DL — SIGNIFICANT CHANGE UP (ref 3.3–5)
ALP SERPL-CCNC: 111 U/L — SIGNIFICANT CHANGE UP (ref 40–120)
ALT FLD-CCNC: 12 U/L — SIGNIFICANT CHANGE UP (ref 10–45)
ANION GAP SERPL CALC-SCNC: 11 MMOL/L — SIGNIFICANT CHANGE UP (ref 5–17)
APTT BLD: 41.9 SEC — HIGH (ref 27.5–35.5)
AST SERPL-CCNC: 14 U/L — SIGNIFICANT CHANGE UP (ref 10–40)
BASOPHILS # BLD AUTO: 0.04 K/UL — SIGNIFICANT CHANGE UP (ref 0–0.2)
BASOPHILS NFR BLD AUTO: 0.4 % — SIGNIFICANT CHANGE UP (ref 0–2)
BILIRUB DIRECT SERPL-MCNC: 0.2 MG/DL — SIGNIFICANT CHANGE UP (ref 0–0.3)
BILIRUB INDIRECT FLD-MCNC: SIGNIFICANT CHANGE UP (ref 0.2–1)
BILIRUB SERPL-MCNC: <0.2 MG/DL — SIGNIFICANT CHANGE UP (ref 0.2–1.2)
BUN SERPL-MCNC: 13 MG/DL — SIGNIFICANT CHANGE UP (ref 7–23)
CALCIUM SERPL-MCNC: 8.8 MG/DL — SIGNIFICANT CHANGE UP (ref 8.4–10.5)
CHLORIDE SERPL-SCNC: 108 MMOL/L — SIGNIFICANT CHANGE UP (ref 96–108)
CHOLEST SERPL-MCNC: 177 MG/DL — SIGNIFICANT CHANGE UP
CO2 SERPL-SCNC: 21 MMOL/L — LOW (ref 22–31)
CREAT SERPL-MCNC: 1.39 MG/DL — HIGH (ref 0.5–1.3)
EOSINOPHIL # BLD AUTO: 0.38 K/UL — SIGNIFICANT CHANGE UP (ref 0–0.5)
EOSINOPHIL NFR BLD AUTO: 3.8 % — SIGNIFICANT CHANGE UP (ref 0–6)
GLUCOSE SERPL-MCNC: 99 MG/DL — SIGNIFICANT CHANGE UP (ref 70–99)
HCT VFR BLD CALC: 36.6 % — LOW (ref 39–50)
HDLC SERPL-MCNC: 61 MG/DL — SIGNIFICANT CHANGE UP
HGB BLD-MCNC: 12.3 G/DL — LOW (ref 13–17)
IMM GRANULOCYTES NFR BLD AUTO: 0.3 % — SIGNIFICANT CHANGE UP (ref 0–1.5)
INR BLD: 0.95 — SIGNIFICANT CHANGE UP (ref 0.88–1.16)
LIPID PNL WITH DIRECT LDL SERPL: 74 MG/DL — SIGNIFICANT CHANGE UP
LYMPHOCYTES # BLD AUTO: 19.8 % — SIGNIFICANT CHANGE UP (ref 13–44)
LYMPHOCYTES # BLD AUTO: 2 K/UL — SIGNIFICANT CHANGE UP (ref 1–3.3)
MAGNESIUM SERPL-MCNC: 1.8 MG/DL — SIGNIFICANT CHANGE UP (ref 1.6–2.6)
MCHC RBC-ENTMCNC: 29.4 PG — SIGNIFICANT CHANGE UP (ref 27–34)
MCHC RBC-ENTMCNC: 33.6 GM/DL — SIGNIFICANT CHANGE UP (ref 32–36)
MCV RBC AUTO: 87.4 FL — SIGNIFICANT CHANGE UP (ref 80–100)
MONOCYTES # BLD AUTO: 0.99 K/UL — HIGH (ref 0–0.9)
MONOCYTES NFR BLD AUTO: 9.8 % — SIGNIFICANT CHANGE UP (ref 2–14)
NEUTROPHILS # BLD AUTO: 6.64 K/UL — SIGNIFICANT CHANGE UP (ref 1.8–7.4)
NEUTROPHILS NFR BLD AUTO: 65.9 % — SIGNIFICANT CHANGE UP (ref 43–77)
NON HDL CHOLESTEROL: 116 MG/DL — SIGNIFICANT CHANGE UP
NRBC # BLD: 0 /100 WBCS — SIGNIFICANT CHANGE UP (ref 0–0)
PLATELET # BLD AUTO: 265 K/UL — SIGNIFICANT CHANGE UP (ref 150–400)
POTASSIUM SERPL-MCNC: 4.1 MMOL/L — SIGNIFICANT CHANGE UP (ref 3.5–5.3)
POTASSIUM SERPL-SCNC: 4.1 MMOL/L — SIGNIFICANT CHANGE UP (ref 3.5–5.3)
PROT SERPL-MCNC: 6.4 G/DL — SIGNIFICANT CHANGE UP (ref 6–8.3)
PROTHROM AB SERPL-ACNC: 11.4 SEC — SIGNIFICANT CHANGE UP (ref 10.6–13.6)
RBC # BLD: 4.19 M/UL — LOW (ref 4.2–5.8)
RBC # FLD: 14.2 % — SIGNIFICANT CHANGE UP (ref 10.3–14.5)
SODIUM SERPL-SCNC: 140 MMOL/L — SIGNIFICANT CHANGE UP (ref 135–145)
TRIGL SERPL-MCNC: 210 MG/DL — HIGH
WBC # BLD: 10.08 K/UL — SIGNIFICANT CHANGE UP (ref 3.8–10.5)
WBC # FLD AUTO: 10.08 K/UL — SIGNIFICANT CHANGE UP (ref 3.8–10.5)

## 2021-12-16 PROCEDURE — C1894: CPT

## 2021-12-16 PROCEDURE — C1769: CPT

## 2021-12-16 PROCEDURE — 82248 BILIRUBIN DIRECT: CPT

## 2021-12-16 PROCEDURE — 85025 COMPLETE CBC W/AUTO DIFF WBC: CPT

## 2021-12-16 PROCEDURE — 93005 ELECTROCARDIOGRAM TRACING: CPT

## 2021-12-16 PROCEDURE — 80061 LIPID PANEL: CPT

## 2021-12-16 PROCEDURE — 83036 HEMOGLOBIN GLYCOSYLATED A1C: CPT

## 2021-12-16 PROCEDURE — 83735 ASSAY OF MAGNESIUM: CPT

## 2021-12-16 PROCEDURE — 99239 HOSP IP/OBS DSCHRG MGMT >30: CPT

## 2021-12-16 PROCEDURE — C1725: CPT

## 2021-12-16 PROCEDURE — 80053 COMPREHEN METABOLIC PANEL: CPT

## 2021-12-16 PROCEDURE — 85730 THROMBOPLASTIN TIME PARTIAL: CPT

## 2021-12-16 PROCEDURE — C1760: CPT

## 2021-12-16 PROCEDURE — 36415 COLL VENOUS BLD VENIPUNCTURE: CPT

## 2021-12-16 PROCEDURE — 85610 PROTHROMBIN TIME: CPT

## 2021-12-16 PROCEDURE — C2623: CPT

## 2021-12-16 PROCEDURE — C1876: CPT

## 2021-12-16 PROCEDURE — C1887: CPT

## 2021-12-16 RX ORDER — LOSARTAN POTASSIUM 100 MG/1
1 TABLET, FILM COATED ORAL
Qty: 30 | Refills: 3
Start: 2021-12-16 | End: 2022-04-14

## 2021-12-16 RX ORDER — ATORVASTATIN CALCIUM 80 MG/1
1 TABLET, FILM COATED ORAL
Qty: 30 | Refills: 3 | DISCHARGE
Start: 2021-12-16 | End: 2022-04-14

## 2021-12-16 RX ORDER — CARVEDILOL PHOSPHATE 80 MG/1
12.5 CAPSULE, EXTENDED RELEASE ORAL EVERY 12 HOURS
Refills: 0 | Status: DISCONTINUED | OUTPATIENT
Start: 2021-12-16 | End: 2021-12-16

## 2021-12-16 RX ORDER — ATORVASTATIN CALCIUM 80 MG/1
1 TABLET, FILM COATED ORAL
Qty: 30 | Refills: 3
Start: 2021-12-16 | End: 2022-04-14

## 2021-12-16 RX ORDER — MAGNESIUM OXIDE 400 MG ORAL TABLET 241.3 MG
400 TABLET ORAL ONCE
Refills: 0 | Status: COMPLETED | OUTPATIENT
Start: 2021-12-16 | End: 2021-12-16

## 2021-12-16 RX ORDER — HYDRALAZINE HCL 50 MG
10 TABLET ORAL ONCE
Refills: 0 | Status: COMPLETED | OUTPATIENT
Start: 2021-12-16 | End: 2021-12-16

## 2021-12-16 RX ORDER — CLOPIDOGREL BISULFATE 75 MG/1
1 TABLET, FILM COATED ORAL
Qty: 30 | Refills: 11
Start: 2021-12-16 | End: 2022-12-10

## 2021-12-16 RX ORDER — CARVEDILOL PHOSPHATE 80 MG/1
1 CAPSULE, EXTENDED RELEASE ORAL
Qty: 60 | Refills: 3
Start: 2021-12-16 | End: 2022-04-14

## 2021-12-16 RX ORDER — CHLORTHALIDONE 50 MG
1 TABLET ORAL
Qty: 30 | Refills: 0
Start: 2021-12-16 | End: 2022-01-14

## 2021-12-16 RX ORDER — AMLODIPINE BESYLATE 2.5 MG/1
5 TABLET ORAL DAILY
Refills: 0 | Status: DISCONTINUED | OUTPATIENT
Start: 2021-12-16 | End: 2021-12-16

## 2021-12-16 RX ORDER — ASPIRIN/CALCIUM CARB/MAGNESIUM 324 MG
1 TABLET ORAL
Qty: 30 | Refills: 11
Start: 2021-12-16 | End: 2022-12-10

## 2021-12-16 RX ADMIN — MAGNESIUM OXIDE 400 MG ORAL TABLET 400 MILLIGRAM(S): 241.3 TABLET ORAL at 10:14

## 2021-12-16 RX ADMIN — CLOPIDOGREL BISULFATE 75 MILLIGRAM(S): 75 TABLET, FILM COATED ORAL at 10:14

## 2021-12-16 RX ADMIN — Medication 81 MILLIGRAM(S): at 10:13

## 2021-12-16 RX ADMIN — Medication 10 MILLIGRAM(S): at 00:28

## 2021-12-16 RX ADMIN — AMLODIPINE BESYLATE 5 MILLIGRAM(S): 2.5 TABLET ORAL at 10:14

## 2021-12-16 RX ADMIN — CARVEDILOL PHOSPHATE 12.5 MILLIGRAM(S): 80 CAPSULE, EXTENDED RELEASE ORAL at 10:14

## 2021-12-16 NOTE — DISCHARGE NOTE PROVIDER - NSDCFUSCHEDAPPT_GEN_ALL_CORE_FT
JAEL LYN ; 01/18/2022 ; [facility] [address]  JAEL LYN ; 03/15/2022 ; [facility] [address]

## 2021-12-16 NOTE — DISCHARGE NOTE PROVIDER - HOSPITAL COURSE
INCOMPLETE    57 y/o male, poor historian current smoker, NON COMPLAINT WITH MEDS with PMHx of HTN, HLD, DM-2, HFmREF (EF 40-45% by echo 9/2020), CVA, CAD s/p HARJINDER mRCA 5/10/2021 @ [facility], PVD s/p Atherectomy/PTA/HARJINDER x2 L SFA  5/17/2021 @ [facility], h/o colonic adenomatous lesion s/p Sigmoidectomy x 11/23/20, initially presented to cardiologist, Dr. Loza on 5/17/20201 c/o weakness and "rubbery feeling" in his legs while walking 4-5 blocks and underwent subsequent HARJINDER L SFA, hospital course c/b pancreatitis. Now pt is c/o numbness in his calf bilaterally with walking and relieved with rest. Pt denies active CP, SOB, palpitations, diaphoresis, orthopnea, PND, dizziness, syncope. Denies any discoloration, open wound or ulcer. Echocardiogram 9/2020 revealing LVEF 40-45%, moderate concentric LVH, Grade 1 Diastolic Dysfunction, resting regional wall motion abnormalities of the inferoseptal wall. In light of patient's risk factors, [place] Class III symptoms, and PAD with residual RSFA , patient presents for staged PTA.     Patient is s/p 12/15/21 HARJINDER to [facility] via R groin PC. s/p Vagal episode, pt. was given atropine in room. Post procedure VSS.     Pt was admitted to [address] [place] [address] overnight for observation. Today pt was seen and examined at bedside, denies complaints of chest pain, dizziness, SOB, palpitations, pain, LE edema, fever, chills. Right groin access site soft, no bleeding or swelling at site, DP/PT pulses at baseline. No events on tele overnight, VSS, Labs unremarkable/stable, Physical exam WNL. Pt was seen and examined by cardiology attending as well and is deemed stable for discharge per Dr. Neville. Pt is to continue ASA/Plavix, Atorvastatin 40mg daily. Pt is to follow up with cardiologist Dr. Loza in 1-2 weeks for post discharge check-up. All medications needing refills were e-prescribed to pt’s preferred pharmacy.   57 y/o male, poor historian current smoker, NON COMPLAINT WITH MEDS with PMHx of HTN, HLD, DM-2, HFmREF (EF 40-45% by echo 9/2020), CVA, CAD s/p HARJINDER mRCA 5/10/2021 @ [facility], PVD s/p Atherectomy/PTA/HARJINDER x2 L SFA  5/17/2021 @ [facility], h/o colonic adenomatous lesion s/p Sigmoidectomy x 11/23/20, initially presented to cardiologist, Dr. Loza on 5/17/20201 c/o weakness and "rubbery feeling" in his legs while walking 4-5 blocks and underwent subsequent HARJINDER L SFA, hospital course c/b pancreatitis. Now pt is c/o numbness in his calf bilaterally with walking and relieved with rest. Pt denies active CP, SOB, palpitations, diaphoresis, orthopnea, PND, dizziness, syncope. Denies any discoloration, open wound or ulcer. Echocardiogram 9/2020 revealing LVEF 40-45%, moderate concentric LVH, Grade 1 Diastolic Dysfunction, resting regional wall motion abnormalities of the inferoseptal wall. In light of patient's risk factors, [place] Class III symptoms, and PAD with residual RSFA , patient presents for staged PTA.     Patient is s/p 12/15/21 HARJINDER to [facility] via R groin PC. s/p Vagal episode, pt. was given atropine in room. Post procedure VSS.     Pt was admitted to [address] [place] [address] overnight for observation. Today pt was seen and examined at bedside, denies complaints of chest pain, dizziness, SOB, palpitations, pain, LE edema, fever, chills. Right groin access site soft, no bleeding or swelling at site, DP/PT pulses at baseline. No events on tele overnight, VSS, Labs unremarkable/stable, Physical exam WNL. Pt was seen and examined by cardiology attending as well and is deemed stable for discharge per Dr. Neville. Pt is to continue ASA/Plavix, Atorvastatin 40mg daily.   Patient non-compliant with BP medications - Chlorthalidone 25 mg once daily, Carvedilol 12.5 mg twice daily, and Losartan 25 mg once daily.   Patient to continue: Aspirin 81 mg once daily, Plavix 75 mg once daily, Carvedilol 12.5 mg twice daily, Atorvastatin 40 mg once daily, and   Pt is to follow up with cardiologist Dr. Loza in 1-2 weeks for post discharge check-up. All medications needing refills were e-prescribed to pt’s preferred pharmacy.   57 y/o male, poor historian current smoker, NON COMPLAINT WITH MEDS with PMHx of HTN, HLD, DM-2, HFmREF (EF 40-45% by echo 9/2020), CVA, CAD s/p HARJINDER mRCA 5/10/2021 @ [facility], PVD s/p Atherectomy/PTA/HARJINDER x2 L SFA  5/17/2021 @ [facility], h/o colonic adenomatous lesion s/p Sigmoidectomy x 11/23/20, initially presented to cardiologist, Dr. Loza on 5/17/20201 c/o weakness and "rubbery feeling" in his legs while walking 4-5 blocks and underwent subsequent HARJINDER L SFA, hospital course c/b pancreatitis. Now pt is c/o numbness in his calf bilaterally with walking and relieved with rest. Pt denies active CP, SOB, palpitations, diaphoresis, orthopnea, PND, dizziness, syncope. Denies any discoloration, open wound or ulcer. Echocardiogram 9/2020 revealing LVEF 40-45%, moderate concentric LVH, Grade 1 Diastolic Dysfunction, resting regional wall motion abnormalities of the inferoseptal wall. In light of patient's risk factors, [place] Class III symptoms, and PAD with residual RSFA , patient presents for staged PTA.     Patient is s/p 12/15/21 HARJINDER to [facility] via R groin PC. s/p Vagal episode, pt. was given atropine in room. Post procedure VSS.     Pt was admitted to [address] [place] [address] overnight for observation. Today pt was seen and examined at bedside, denies complaints of chest pain, dizziness, SOB, palpitations, pain, LE edema, fever, chills. Right groin access site soft, no bleeding or swelling at site, DP/PT pulses at baseline. No events on tele overnight, VSS, Labs unremarkable/stable, Physical exam WNL. Pt was seen and examined by cardiology attending as well and is deemed stable for discharge per Dr. Neville.  Patient to continue: Aspirin 81 mg once daily, Plavix 75 mg once daily, Carvedilol 12.5 mg twice daily, Atorvastatin 40 mg once daily, Losartan 50 mg once daily, and Chlorthalidone 25 mg once daily.   Pt is to follow up with cardiologist Dr. Loza in 1-2 weeks for post discharge check-up. All medications needing refills were e-prescribed to pt’s preferred pharmacy.

## 2021-12-16 NOTE — DISCHARGE NOTE PROVIDER - NSDCCPCAREPLAN_GEN_ALL_CORE_FT
PRINCIPAL DISCHARGE DIAGNOSIS  Diagnosis: PAD (peripheral artery disease)  Assessment and Plan of Treatment: You have a diagnosis of peripheral artery disease and received a stent to your right superficial femoral artery. You have been started on Aspirin 81mg daily and Plavix (Clopidogrel) 75mg daily. You MUST continue taking the daily Aspirin and Plavix to ensure your stent does not close. DO NOT STOP THESE MEDICATIONS FOR ANY REASON UNLESS OTHERWISE INDICATED BY YOUR CARDIOLOGIST BECAUSE THIS WILL PUT YOU AT RISK FOR YOUR STENT CLOSING. You should refrain from strenuous activity and heavy lifting for 1 week. Please make a follow up appointment with your cardiologist within 1-2 weeks of your discharge. All of your prescriptions have been sent electronically to your pharmacy.         PRINCIPAL DISCHARGE DIAGNOSIS  Diagnosis: PAD (peripheral artery disease)  Assessment and Plan of Treatment: You have a diagnosis of peripheral artery disease and received a stent to your right superficial femoral artery. You have been started on Aspirin 81mg daily and Plavix (Clopidogrel) 75mg daily. You MUST continue taking the daily Aspirin and Plavix to ensure your stent does not close. DO NOT STOP THESE MEDICATIONS FOR ANY REASON UNLESS OTHERWISE INDICATED BY YOUR CARDIOLOGIST BECAUSE THIS WILL PUT YOU AT RISK FOR YOUR STENT CLOSING. You should refrain from strenuous activity and heavy lifting for 1 week. Please make a follow up appointment with your cardiologist within 1-2 weeks of your discharge. All of your prescriptions have been sent electronically to your pharmacy.        SECONDARY DISCHARGE DIAGNOSES  Diagnosis: Hypertension  Assessment and Plan of Treatment: You have a diagnosis of Hypertension or elevated blood pressure. Please continue taking your medications as listed to keep your blood pressure controlled. In addition, there are multiple lifestyle modifications that have been proven to lower blood pressure: maintaining a healthy body weight, engaging in regular physical activity for at least 30 minutes per day on most days, and consuming a diet rich in fruits, vegetables, and low-fat dairy products with a reduced amount of total and saturated fats and sodium. Please continue CARVEDILOL (COREG) 12.5 mg twice daily, LOSARTAN 50 mg once daily, and CHLORTHALIDONE 25 mg once daily.   For blood pressures at home that are too high or low please see your Doctor or go to the Emergency Room as necessary.      Diagnosis: Hyperlipidemia  Assessment and Plan of Treatment: Too much cholesterol in your arteries may lead to a buildup of plaque known as atherosclerosis and contribute to heart disease. Please continue: ATORVASTATIN 40 mg once daily.  Appropriate refills were sent to your preferred pharmacy. Please follow-up with your cardiologist for further management.

## 2021-12-16 NOTE — DISCHARGE NOTE NURSING/CASE MANAGEMENT/SOCIAL WORK - NSDCPEFALRISK_GEN_ALL_CORE
For information on Fall & Injury Prevention, visit: [email or web address] OR  [email or web address] OR  https://www.cdc.gov/steadi/patient.html

## 2021-12-16 NOTE — DISCHARGE NOTE PROVIDER - NSDCFUADDINST_GEN_ALL_CORE_FT
A Mediterranean Diet is recommended! Some suggestions include continue incorporating 2 or more servings per day of vegetables, fruits, and whole grains. Increase intake of fish and legumes/beans to 2 or more servings per week. Aim to increase intake of healthy fats, such as olive oil and avocados, and have a handful of nuts/seeds most days. Reduce red/processed meat consumption to 2 or fewer times per week.    The catheter from your groin was removed and bleeding was stopped with a closure device.  After 24hours you may take off the dressing and shower. Wash the site with soap and water.  There is no need to put on another bandage.  Avoid tub baths, hot tubs or swimming for 5 days.      Call the Interventional Cardiology Team at [phone] if any of following occur pertaining to your vascular access site:  Bleeding or hematoma formation (collection of blood under the skin), drainage or redness at the puncture site, numbness, decrease in strength, coolness or pale coloration of skin of the leg or hand.

## 2021-12-16 NOTE — DISCHARGE NOTE NURSING/CASE MANAGEMENT/SOCIAL WORK - PATIENT PORTAL LINK FT
You can access the FollowMyHealth Patient Portal offered by [facility] by registering at the following website: [email or web address]. By joining [email or web address]’s FollowMyHealth portal, you will also be able to view your health information using other applications (apps) compatible with our system.

## 2021-12-16 NOTE — DISCHARGE NOTE PROVIDER - CARE PROVIDER_API CALL
Milo Loza  CARDIOVASCULAR DISEASE  [address] [place] [address]  Phone: [phone]  Fax: [phone]  Follow Up Time:

## 2021-12-16 NOTE — DISCHARGE NOTE PROVIDER - NSDCMRMEDTOKEN_GEN_ALL_CORE_FT
aspirin 81 mg oral delayed release tablet: 1 tab(s) orally once a day  atorvastatin 40 mg oral tablet: 1 tab(s) orally once a day (at bedtime)  carvedilol 12.5 mg oral tablet: 1 tab(s) orally every 12 hours  chlorthalidone 25 mg oral tablet: 1 tab(s) orally once a day   clopidogrel 75 mg oral tablet: 1 tab(s) orally once a day  losartan 50 mg oral tablet: 1 tab(s) orally once a day

## 2021-12-20 ENCOUNTER — NON-APPOINTMENT (OUTPATIENT)
Age: 56
End: 2021-12-20

## 2021-12-20 ENCOUNTER — APPOINTMENT (OUTPATIENT)
Dept: HEART AND VASCULAR | Facility: CLINIC | Age: 56
End: 2021-12-20
Payer: MEDICAID

## 2021-12-20 VITALS
OXYGEN SATURATION: 99 % | WEIGHT: 146 LBS | DIASTOLIC BLOOD PRESSURE: 79 MMHG | SYSTOLIC BLOOD PRESSURE: 117 MMHG | BODY MASS INDEX: 22.91 KG/M2 | TEMPERATURE: 97.2 F | HEIGHT: 67 IN | HEART RATE: 89 BPM

## 2021-12-20 DIAGNOSIS — M79.606 PAIN IN LEG, UNSPECIFIED: ICD-10-CM

## 2021-12-20 PROBLEM — E11.9 TYPE 2 DIABETES MELLITUS WITHOUT COMPLICATIONS: Chronic | Status: ACTIVE | Noted: 2021-12-14

## 2021-12-20 PROBLEM — I25.10 ATHEROSCLEROTIC HEART DISEASE OF NATIVE CORONARY ARTERY WITHOUT ANGINA PECTORIS: Chronic | Status: ACTIVE | Noted: 2021-12-14

## 2021-12-20 PROBLEM — I73.9 PERIPHERAL VASCULAR DISEASE, UNSPECIFIED: Chronic | Status: ACTIVE | Noted: 2021-12-14

## 2021-12-20 PROCEDURE — 93926 LOWER EXTREMITY STUDY: CPT

## 2021-12-20 PROCEDURE — 99204 OFFICE O/P NEW MOD 45 MIN: CPT

## 2021-12-20 PROCEDURE — 93000 ELECTROCARDIOGRAM COMPLETE: CPT

## 2021-12-21 LAB
ANION GAP SERPL CALC-SCNC: 12 MMOL/L
BASOPHILS # BLD AUTO: 0.06 K/UL
BASOPHILS NFR BLD AUTO: 0.5 %
BUN SERPL-MCNC: 15 MG/DL
CALCIUM SERPL-MCNC: 9.8 MG/DL
CHLORIDE SERPL-SCNC: 99 MMOL/L
CO2 SERPL-SCNC: 27 MMOL/L
CREAT SERPL-MCNC: 1.58 MG/DL
CRP SERPL-MCNC: 58 MG/L
EOSINOPHIL # BLD AUTO: 0.29 K/UL
EOSINOPHIL NFR BLD AUTO: 2.4 %
GLUCOSE SERPL-MCNC: 105 MG/DL
HCT VFR BLD CALC: 42.8 %
HGB BLD-MCNC: 14 G/DL
IMM GRANULOCYTES NFR BLD AUTO: 0.4 %
LYMPHOCYTES # BLD AUTO: 2.19 K/UL
LYMPHOCYTES NFR BLD AUTO: 18.1 %
MAN DIFF?: NORMAL
MCHC RBC-ENTMCNC: 28.9 PG
MCHC RBC-ENTMCNC: 32.7 GM/DL
MCV RBC AUTO: 88.4 FL
MONOCYTES # BLD AUTO: 1.23 K/UL
MONOCYTES NFR BLD AUTO: 10.2 %
NEUTROPHILS # BLD AUTO: 8.25 K/UL
NEUTROPHILS NFR BLD AUTO: 68.4 %
PLATELET # BLD AUTO: 334 K/UL
POTASSIUM SERPL-SCNC: 4.5 MMOL/L
RBC # BLD: 4.84 M/UL
RBC # FLD: 14 %
SODIUM SERPL-SCNC: 137 MMOL/L
WBC # FLD AUTO: 12.07 K/UL

## 2021-12-28 NOTE — REASON FOR VISIT
[FreeTextEntry1] : s/p recent right SFA  intervention, he reports having significant pain in the right thight. He has a normal arterail duplex of the right lower extremity.

## 2022-01-18 ENCOUNTER — APPOINTMENT (OUTPATIENT)
Dept: UROLOGY | Facility: CLINIC | Age: 57
End: 2022-01-18
Payer: MEDICAID

## 2022-01-18 PROCEDURE — 99213 OFFICE O/P EST LOW 20 MIN: CPT

## 2022-01-18 NOTE — HISTORY OF PRESENT ILLNESS
[FreeTextEntry1] : Very pleasant 56-year-old gentleman who presents for follow-up of erectile dysfunction.  He reports sildenafil 100 mg and reports a significant improvement in his erections on this. He is happy with his erections at this point.No other complaints.

## 2022-01-18 NOTE — ASSESSMENT
[FreeTextEntry1] : Very pleasant 56 year old gentleman who presents for follow up of erectile dysfunction\par -Reports sildenafil continues to significantly improve his erections\par -Continue sildenafil 100 mg prn\par -F/U in 6 months

## 2022-03-15 ENCOUNTER — APPOINTMENT (OUTPATIENT)
Dept: UROLOGY | Facility: CLINIC | Age: 57
End: 2022-03-15

## 2022-04-08 ENCOUNTER — APPOINTMENT (OUTPATIENT)
Age: 57
End: 2022-04-08
Payer: MEDICAID

## 2022-04-08 PROCEDURE — 43237 ENDOSCOPIC US EXAM ESOPH: CPT

## 2022-07-19 ENCOUNTER — APPOINTMENT (OUTPATIENT)
Dept: UROLOGY | Facility: CLINIC | Age: 57
End: 2022-07-19

## 2022-09-13 ENCOUNTER — APPOINTMENT (OUTPATIENT)
Dept: UROLOGY | Facility: CLINIC | Age: 57
End: 2022-09-13

## 2022-09-13 PROCEDURE — 99214 OFFICE O/P EST MOD 30 MIN: CPT

## 2022-09-13 NOTE — ASSESSMENT
[FreeTextEntry1] : Very pleasant 57-year-old gentleman presents for follow-up of erectile dysfunction, new complaint of gross hematuria\par -urinalysis\par -urine culture\par -cytology\par -CT urogram\par -cystoscopy\par -discussed potential etiologies of hematuria, including cancer. Discussed importance of workup. He wishes to proceecd\par -refill for sildenafil sent to the pharmacy.

## 2022-09-13 NOTE — HISTORY OF PRESENT ILLNESS
[FreeTextEntry1] : Very pleasant 57-year-old gentleman who presents for follow-up of erectile dysfunction, new complaint of gross hematuria.  He reports sildenafil 100 mg significantly improves his erections.  He is happy with his erections at this point.\par \par Patient reports hematuria started 6 days ago. Persistent now.  No history of gross hematuria prior to this.  No flank pain. No suprapubic pain. No dysuria.  No urinary frequency, urgency. No history of urinary tract infections or renal impairment. No aggravating or alleviating factors that he knows of contributing to hematuria.\par \par No family history of  malignancy, including prostate cancer.  No family history of nephrolithiasis. Smokes 1/3 ppd cigarettes\par

## 2022-09-14 LAB
ANION GAP SERPL CALC-SCNC: 13 MMOL/L
APPEARANCE: ABNORMAL
BACTERIA: NEGATIVE
BILIRUBIN URINE: NEGATIVE
BLOOD URINE: ABNORMAL
BUN SERPL-MCNC: 11 MG/DL
CALCIUM SERPL-MCNC: 9.1 MG/DL
CHLORIDE SERPL-SCNC: 109 MMOL/L
CO2 SERPL-SCNC: 22 MMOL/L
COLOR: ABNORMAL
CREAT SERPL-MCNC: 1.32 MG/DL
EGFR: 63 ML/MIN/1.73M2
GLUCOSE QUALITATIVE U: NEGATIVE
GLUCOSE SERPL-MCNC: 80 MG/DL
KETONES URINE: NEGATIVE
LEUKOCYTE ESTERASE URINE: NEGATIVE
MICROSCOPIC-UA: NORMAL
NITRITE URINE: NEGATIVE
PH URINE: 8.5
POTASSIUM SERPL-SCNC: 4 MMOL/L
PROTEIN URINE: ABNORMAL
PSA SERPL-MCNC: 0.95 NG/ML
RED BLOOD CELLS URINE: >720 /HPF
SODIUM SERPL-SCNC: 145 MMOL/L
SPECIFIC GRAVITY URINE: 1.01
SQUAMOUS EPITHELIAL CELLS: 0 /HPF
URINE CYTOLOGY: NORMAL
UROBILINOGEN URINE: NORMAL
WHITE BLOOD CELLS URINE: 3 /HPF

## 2022-09-15 LAB — BACTERIA UR CULT: NORMAL

## 2022-10-17 NOTE — ASU PATIENT PROFILE, ADULT - PAIN SCALE PREFERRED, PROFILE
numerical 0-10 Banner Transposition Flap Text: The defect edges were debeveled with a #15 scalpel blade.  Given the location of the defect and the proximity to free margins a Banner transposition flap was deemed most appropriate.  Using a sterile surgical marker, an appropriate flap drawn around the defect. The area thus outlined was incised deep to adipose tissue with a #15 scalpel blade.  The skin margins were undermined to an appropriate distance in all directions utilizing iris scissors.

## 2022-10-18 ENCOUNTER — APPOINTMENT (OUTPATIENT)
Dept: UROLOGY | Facility: CLINIC | Age: 57
End: 2022-10-18

## 2022-10-18 PROCEDURE — 52000 CYSTOURETHROSCOPY: CPT

## 2022-10-18 PROCEDURE — 99214 OFFICE O/P EST MOD 30 MIN: CPT | Mod: 25

## 2022-10-18 NOTE — ASSESSMENT
[FreeTextEntry1] : Very pleasant 57-year-old gentleman who presents for follow-up of gross hematuria, BPH, screening for prostate cancer, erectile dysfunction\par -Continue sildenafil for erectile dysfunction\par -PSA 0.95\par -Creatinine 1.32\par -CT images reviewed demonstrating no kidney stones, hydronephrosis, renal masses but it did demonstrate enlarged prostate with a mildly thickened bladder wall.  Incidentally noted on CT scan was evidence of small bowel intussusception in the right lower quadrant as well as stable pancreatic head calcifications\par -Cystoscopy demonstrates no urothelial lesions\par -I recommended that he follow-up with his gastroenterologist regarding incidental findings on CT scan which she will do\par -Follow-up in 6 months

## 2022-10-18 NOTE — HISTORY OF PRESENT ILLNESS
[FreeTextEntry1] : Very pleasant 57-year-old gentleman who presents for follow-up of erectile dysfunction, BPH, gross hematuria.  He reports sildenafil 100 mg significantly improves his erections.  He is happy with his erections at this point.\par \par Patient reports history of gross hematuria recently.  This has not recurred.  He underwent a CT scan which demonstrated no kidney stones, hydronephrosis, renal masses, but does demonstrate an enlarged prostate with mild diffuse wall thickening of the bladder.  Incidentally noted was also evidence of small bowel intussusception without obstruction.  It also demonstrated stable pancreatic calcifications.  He underwent a cystoscopy today which demonstrated no urothelial lesions but did demonstrate an enlarged prostate.

## 2022-11-01 ENCOUNTER — TRANSCRIPTION ENCOUNTER (OUTPATIENT)
Age: 57
End: 2022-11-01

## 2023-01-30 NOTE — PHYSICAL THERAPY INITIAL EVALUATION ADULT - PREDICTED DURATION OF THERAPY (DAYS/WKS), PT EVAL
Airway    Date/Time: 1/30/2023 3:09 PM  Performed by: Lisa Baez M.D.  Authorized by: Lisa Baez M.D.     Location:  OR  Urgency:  Elective  Indications for Airway Management:  Anesthesia      Spontaneous Ventilation: absent    Sedation Level:  Deep  Preoxygenated: Yes    Final Airway Type:  Supraglottic airway  Final Supraglottic Airway:  Standard LMA    SGA Size:  4  Number of Attempts at Approach:  1          
PT evaluation only 2/2 pt demonstrating independence with mobility

## 2023-02-15 NOTE — PATIENT PROFILE ADULT - NSPROGENBLOODRESTRICT_GEN_A_NUR
Called to pre assess patient for LHC with Dr. Jeremías Bruno. No answer, detailed message left. Arrival time 1030. Patient instructed NPO after midnight. Take 81mg ASA x 4 and 75mg Plavix the morning of the procedure. Callback number left. blood borne infection concerns

## 2023-02-23 ENCOUNTER — RX RENEWAL (OUTPATIENT)
Age: 58
End: 2023-02-23

## 2023-04-18 ENCOUNTER — APPOINTMENT (OUTPATIENT)
Dept: UROLOGY | Facility: CLINIC | Age: 58
End: 2023-04-18

## 2023-05-08 ENCOUNTER — APPOINTMENT (OUTPATIENT)
Dept: SURGICAL ONCOLOGY | Facility: CLINIC | Age: 58
End: 2023-05-08
Payer: MEDICAID

## 2023-05-08 VITALS
TEMPERATURE: 98.3 F | HEIGHT: 67 IN | DIASTOLIC BLOOD PRESSURE: 103 MMHG | OXYGEN SATURATION: 96 % | HEART RATE: 87 BPM | WEIGHT: 152 LBS | BODY MASS INDEX: 23.86 KG/M2 | SYSTOLIC BLOOD PRESSURE: 168 MMHG

## 2023-05-08 DIAGNOSIS — K85.90 ACUTE PANCREATITIS WITHOUT NECROSIS OR INFECTION, UNSPECIFIED: ICD-10-CM

## 2023-05-08 LAB
BASOPHILS # BLD AUTO: 0.04 K/UL
BASOPHILS NFR BLD AUTO: 0.5 %
EOSINOPHIL # BLD AUTO: 0.25 K/UL
EOSINOPHIL NFR BLD AUTO: 2.9 %
HCT VFR BLD CALC: 38.2 %
HGB BLD-MCNC: 13 G/DL
IMM GRANULOCYTES NFR BLD AUTO: 0.2 %
LYMPHOCYTES # BLD AUTO: 2.17 K/UL
LYMPHOCYTES NFR BLD AUTO: 25.1 %
MAN DIFF?: NORMAL
MCHC RBC-ENTMCNC: 29.5 PG
MCHC RBC-ENTMCNC: 34 GM/DL
MCV RBC AUTO: 86.8 FL
MONOCYTES # BLD AUTO: 0.79 K/UL
MONOCYTES NFR BLD AUTO: 9.1 %
NEUTROPHILS # BLD AUTO: 5.39 K/UL
NEUTROPHILS NFR BLD AUTO: 62.2 %
PLATELET # BLD AUTO: 210 K/UL
RBC # BLD: 4.4 M/UL
RBC # FLD: 14.6 %
WBC # FLD AUTO: 8.66 K/UL

## 2023-05-08 PROCEDURE — 99215 OFFICE O/P EST HI 40 MIN: CPT

## 2023-05-08 NOTE — PHYSICAL EXAM
[Normal Neck Lymph Nodes] : normal neck lymph nodes  [Normal Supraclavicular Lymph Nodes] : normal supraclavicular lymph nodes [Normal] : oriented to person, place and time, with appropriate affect [de-identified] : anicteric [de-identified] : S1,S2, regular rate and rhythm. No murmurs heard. [de-identified] : Clear throughout. No wheezes heard. [de-identified] : warm, dry

## 2023-05-08 NOTE — HISTORY OF PRESENT ILLNESS
[de-identified] : Patient Name: JAEL LYN \par MRN: [id number] \par Favio MRN: [id number] \par Referring Provider: none \par Date: 5/8/23\par \par Diagnosis: Pancreatitis\par \par 57 year male  presents for follow up of pancreatitis. \par 5/17/21 - He was admitted to [facility] from his cardiologist (Dr. Loza) for weakness. During his stay, which included a cardiac workup including a cardiac cath on 5/17/21, he developed abdominal pain and vomiting. Abdominal Xray revealed a possible small bowel ileus versus small bowel obstruction. A CT AP revealed mild ascites with fluid around the pancreas. MRI done showing acute pancreatitis. His amylase and lipase were also elevated. His pain was managed and was advised to follow up as outpatient. \par 7/15/21 - Abdominal ultrasound was unremarkable and he was referred to GI for an upper EUS\par 4/8/22 - Upper EUS shows sonographic changes of moderate to severe chronic pancreatitis\par \par For the past few weeks he's had daily mid abdominal pains that come and go. He is tolerating a regular diet and is having regular bowel movements. He had one episode of nauseousness but no vomiting. He denies fevers or chills.\par

## 2023-05-08 NOTE — ASSESSMENT
[FreeTextEntry1] : Mr. Saab is a 57 y.o. man with h/o chronic pancreatitis.\par He had an extensive work up in 2021 and at that time no etiology was determined. Of note, no gallstones were demonstrated by neither US nor MRI.\par Mr. Saab still has recurrent episodes of pain, which do not appear to be related to meals.\par AT this time, I would like to repeat basic labs (including LFT and amylase) and a CT of the pancreas, to evaluate whether there has been any progression in the appearance of the pancreas (e.g. PD dilation, formation of pseudocysts, new pancreatic mass). \par Of note, he also has a h/o colon resection for TVA on November 2020, but did not have a colonoscopy since.\par I will obtain CEA along with the basic labs. Assuming a negative CT, we will refer to Dr. Celestin for a colonoscopy.\par \par Should every test be negative, we may still consider cholecystectomy, The use of empiric cholecystectomy for idiopathic pancreatitis (IP) is supported by both the high prevalence of occult microlithiasis (bile crystals) in patients with IP and by the high false-negative rate of bile crystal analysis. Two prospective studies found a high prevalence of microlithiasis (65% to 85%) in patients with IP and those who underwent therapy with cholecystectomy had no further attacks. A number of retrospective series, have found occult gallstones or microlithiasis to be present in 37%–89% of patients with recurrent IP.

## 2023-05-09 LAB
ALBUMIN SERPL ELPH-MCNC: 4.2 G/DL
ALP BLD-CCNC: 120 U/L
ALT SERPL-CCNC: 16 U/L
ANION GAP SERPL CALC-SCNC: 11 MMOL/L
AST SERPL-CCNC: 20 U/L
BILIRUB DIRECT SERPL-MCNC: 0.1 MG/DL
BILIRUB INDIRECT SERPL-MCNC: 0.1 MG/DL
BILIRUB SERPL-MCNC: 0.2 MG/DL
BUN SERPL-MCNC: 16 MG/DL
CALCIUM SERPL-MCNC: 9.2 MG/DL
CANCER AG19-9 SERPL-ACNC: 11 U/ML
CEA SERPL-MCNC: 3.3 NG/ML
CHLORIDE SERPL-SCNC: 105 MMOL/L
CO2 SERPL-SCNC: 25 MMOL/L
CREAT SERPL-MCNC: 1.45 MG/DL
EGFR: 57 ML/MIN/1.73M2
GLUCOSE SERPL-MCNC: 87 MG/DL
POTASSIUM SERPL-SCNC: 4.2 MMOL/L
PROT SERPL-MCNC: 6.5 G/DL
SODIUM SERPL-SCNC: 141 MMOL/L

## 2023-05-14 ENCOUNTER — APPOINTMENT (OUTPATIENT)
Dept: CT IMAGING | Facility: HOSPITAL | Age: 58
End: 2023-05-14

## 2023-05-14 ENCOUNTER — OUTPATIENT (OUTPATIENT)
Dept: OUTPATIENT SERVICES | Facility: HOSPITAL | Age: 58
LOS: 1 days | End: 2023-05-14
Payer: MEDICAID

## 2023-05-14 DIAGNOSIS — Z90.49 ACQUIRED ABSENCE OF OTHER SPECIFIED PARTS OF DIGESTIVE TRACT: Chronic | ICD-10-CM

## 2023-05-14 PROCEDURE — 74177 CT ABD & PELVIS W/CONTRAST: CPT | Mod: 26

## 2023-05-14 PROCEDURE — 74177 CT ABD & PELVIS W/CONTRAST: CPT

## 2023-06-02 ENCOUNTER — NON-APPOINTMENT (OUTPATIENT)
Age: 58
End: 2023-06-02

## 2023-06-26 ENCOUNTER — APPOINTMENT (OUTPATIENT)
Age: 58
End: 2023-06-26

## 2023-07-12 ENCOUNTER — APPOINTMENT (OUTPATIENT)
Dept: UROLOGY | Facility: CLINIC | Age: 58
End: 2023-07-12
Payer: MEDICAID

## 2023-07-12 PROCEDURE — 99214 OFFICE O/P EST MOD 30 MIN: CPT

## 2023-07-12 NOTE — HISTORY OF PRESENT ILLNESS
[Currently Experiencing ___] :  [unfilled] [Erectile Dysfunction] : Erectile Dysfunction [None] : None [FreeTextEntry1] : Mr. Saab is a very pleasant 57 year old man here today for ED and prostate cancer screening.\par He reports feeling okay since he was here last.\par Denies any hematuria or dysuria.\par Reports sildenafil continues to work for erections.\par

## 2023-07-12 NOTE — ASSESSMENT
[FreeTextEntry1] : Mr. Saab is a very pleasant 57 year old man here today for ED and prostate cancer screening.\par He reports feeling okay since he was here last.\par Denies any hematuria or dysuria.\par Reports sildenafil continues to work for erections.\par PSA 09/2022 0.95.\par Continue sildenafil - refills sent to pharmacy.\par PSA today\par Creatinine 1.45, stable\par WBC 8.66, H/H 13/38.2\par RTO in 6 months.

## 2023-07-13 LAB — PSA SERPL-MCNC: 1.02 NG/ML

## 2023-08-08 ENCOUNTER — NON-APPOINTMENT (OUTPATIENT)
Age: 58
End: 2023-08-08

## 2023-08-15 NOTE — ED PROVIDER NOTE - CARDIOVASCULAR NEGATIVE STATEMENT, MLM
[facility] called and stated that they received the prescription for Oxycodone 5 mg caplets. However, they do not have them and not sure if and when they could get them. They do have the tablets. Please resend prescription if it can change to tablets.   no chest pain and no edema.

## 2023-08-24 NOTE — H&P ADULT - PATIENT ON (OXYGEN DELIVERY METHOD)
Detail Level: Detailed Quality 110: Preventive Care And Screening: Influenza Immunization: Influenza Immunization Administered during Influenza season Quality 226: Preventive Care And Screening: Tobacco Use: Screening And Cessation Intervention: Patient screened for tobacco use and is an ex/non-smoker Quality 111:Pneumonia Vaccination Status For Older Adults: Patient received any pneumococcal conjugate or polysaccharide vaccine on or after their 60th birthday and before the end of the measurement period Quality 130: Documentation Of Current Medications In The Medical Record: Current Medications Documented room air

## 2023-09-13 NOTE — ED PROVIDER NOTE - EYES, MLM
Clear bilaterally, pupils equal, round and reactive to light. details… normal/normal affect/alert and oriented x3/normal behavior

## 2023-10-16 NOTE — ASSESSMENT
[FreeTextEntry1] : Assessment;\par Mr. Katiana Gotti is a 54 y/o Male presented today w/ a large colon mass detected previously Dr. Awais Peres due to complaints of lower abd pain. Previous Colonoscopy 08/22/20:  Large mass, pathology: Colon, sigmoid, r/o cancer, biopsy: Tubular adeno,a w/ very local high grade dysplasia, fragments of. MRI: 08/24/20 No intrahepatic or extrahepatic biliary dilatation.  There is no filling defect.\par --------------------------------\par PLAN:\par - Rpt CT scan abd/pelvis to evaluate sigmoid mass and mesenteric nodes\par - [facility] path review(pending) of rectosigmoid polyp- tubular adenoma with high grade dysplasia\par - Laparascopic - Low Anterior Resection of colon mass to be scheduled after completion of Rpt CT scan.\par -Would appreciate preoperative medical optimization and risk stratificatoin from DrBubba Loza prior to procedure. \par - CEA, CBC, CMP, CEA - ordered today \par I have discussed the diagnosis, therapeutic plan and options with the patient at length. Patient expressed verbal understanding to proceed with the proposed plan. All questions answered. \par I have discussed the risks, benefits, alternatives, complications including but not limited bleeding, infection, damage to adjacent structures,sepsis, need for further procedures, anastomotic leak, tumor recurrence to the patient in detail. Patient expressed verbal understanding. Written informed consent to be obtained in the preoperative period.\par \par \par \par \par  <-- Click to add NO significant Past Surgical History

## 2023-11-17 NOTE — H&P ADULT - NSHPROSALLOTHERNEGRD_GEN_ALL_CORE
Called to schedule VV. Lvm for pt sister to call back. All other review of systems negative, except as noted in HPI

## 2023-12-04 ENCOUNTER — RX RENEWAL (OUTPATIENT)
Age: 58
End: 2023-12-04

## 2024-01-10 ENCOUNTER — NON-APPOINTMENT (OUTPATIENT)
Age: 59
End: 2024-01-10

## 2024-01-12 ENCOUNTER — APPOINTMENT (OUTPATIENT)
Dept: UROLOGY | Facility: CLINIC | Age: 59
End: 2024-01-12
Payer: MEDICAID

## 2024-01-12 VITALS
HEIGHT: 67 IN | RESPIRATION RATE: 16 BRPM | BODY MASS INDEX: 23.86 KG/M2 | SYSTOLIC BLOOD PRESSURE: 126 MMHG | OXYGEN SATURATION: 99 % | TEMPERATURE: 97 F | WEIGHT: 152 LBS | HEART RATE: 79 BPM | DIASTOLIC BLOOD PRESSURE: 82 MMHG

## 2024-01-12 DIAGNOSIS — R31.0 GROSS HEMATURIA: ICD-10-CM

## 2024-01-12 PROCEDURE — 99214 OFFICE O/P EST MOD 30 MIN: CPT

## 2024-01-12 NOTE — HISTORY OF PRESENT ILLNESS
[FreeTextEntry1] : Very pleasant 58-year-old gentleman who presents for follow-up of erectile dysfunction, BPH, history of hematuria.  He continues to report sildenafil 100 mg significantly improves his erections.  He is happy with his erections.  Patient reports history of gross hematuria in the past.  He underwent a CT scan which demonstrated no kidney stones, hydronephrosis, renal masses, but did demonstrate an enlarged prostate with mild diffuse wall thickening of the bladder. Cystoscopy demonstrated no urothelial lesions but did demonstrate an enlarged prostate.

## 2024-01-12 NOTE — ASSESSMENT
[FreeTextEntry1] : Very pleasant 58 year old gentleman who presents for follow up of ED, BPH, history of hematuria -PSA 1.02 -Creatinine 1.45 -WBC 8.66, H/H 13/38.2 -Continue sildenafil 100 mg as needed-refill sent to the pharmacy -Follow-up in 6 months

## 2024-01-12 NOTE — H&P ADULT - ASSESSMENT
56yo Male, current smoker, with PMHx of HTN, HFmEF (EF 40-45%), known CAD s/p dx cath 10/2020 (positive FFR mRCA, no intervention 2/2 upcoming surgery), known occluded R+L SFA and Colonic adenomatous lesion s/p Sigmoidectomy 11/23/20, who initially presented to the cardiologist Dr. Loza c/o KEON with 2-3 flights of stairs for the past one month. He denies any CP, palpitations, dizziness, syncope, diaphoresis, fatigue, LE edema, orthopnea, PND, N/V, fever, chills or recent sick contact. ECG in office revealed NSR with LVH and Echo (9/2020) revealed EF 40-45%, mod concentric LVH, Grade I Diastolic dysfunction, resting regional WMA of inferoseptal wall, mild MR/TR. He subsequently had a NST revealed EF 34% with mild-mod inferoposterior scarring, no evidence of reversible ischemia. CTA Aorta w/ LE Runoff (4/22/21) revealing occluded R distal SFA, occluded L SFA, ectatic R common illiac artery and L internal iliac artery, nonvisualization of distal R anterior Tibial (probably occluded). Pts most recent Cardiac Cath 10/26/20: positive FFR 0.7 of mRCA, LM normal, LAD mild luminal, Ramus small vessel mild diffuse, mLCx 30%, dLCx 40%, OM1 normal. In light of pts risk factors, CCS class III anginal symptoms, abnormal NST and prior cath, pt now presents to [facility] for staged PCI of RCA.    -H/H = ____. Pt denies BRBPR, hematuria, hematochezia, melena. Pt loaded 325mg ASA x1 and Plavix 600mg x1  -Cr = _____. EF normal. Euvolemic on exam. IVF @ 75cc/hr started pre procedure  -Type of sedation: moderate  -Candidate for sedation: yes     Risks & benefits of procedure and alternative therapy have been explained to the patient including but not limited to: allergic reaction, bleeding w/possible need for blood transfusion, infection, renal and vascular compromise, limb damage, arrhythmia, stroke, vessel dissection/perforation, Myocardial infarction, emergent CABG. Informed consent obtained and in chart.    56yo Male, current smoker, with PMHx of HTN, HFmEF (EF 40-45%), known CAD s/p dx cath 10/2020 (positive FFR mRCA, no intervention 2/2 upcoming surgery), known occluded R+L SFA and Colonic adenomatous lesion s/p Sigmoidectomy 11/23/20, who initially presented to the cardiologist Dr. Loza c/o HERBERT with 2-3 flights of stairs for the past one month. He denies any CP, palpitations, dizziness, syncope, diaphoresis, fatigue, LE edema, orthopnea, PND, N/V, fever, chills or recent sick contact. ECG in office revealed NSR with LVH and Echo (9/2020) revealed EF 40-45%, mod concentric LVH, Grade I Diastolic dysfunction, resting regional WMA of inferoseptal wall, mild MR/TR. He subsequently had a NST revealed EF 34% with mild-mod inferoposterior scarring, no evidence of reversible ischemia. CTA Aorta w/ LE Runoff (4/22/21) revealing occluded R distal SFA, occluded L SFA, ectatic R common illiac artery and L internal iliac artery, nonvisualization of distal R anterior Tibial (probably occluded). Pts most recent Cardiac Cath 10/26/20: positive FFR 0.7 of mRCA, LM normal, LAD mild luminal, Ramus small vessel mild diffuse, mLCx 30%, dLCx 40%, OM1 normal. In light of pts risk factors, CCS class III anginal symptoms, abnormal NST and prior cath, pt now presents to [facility] for staged PCI of RCA.    -H/H = 12.8/37.2. Pt denies BRBPR, hematuria, hematochezia, melena. Pt loaded w/ Plavix 600mg x1 pre cath per Dr. Loza  -Cr = _____. EF 40-45%. Euvolemic on exam. IVF @ 50cc/hr started pre procedure  -Type of sedation: moderate  -Candidate for sedation: yes     Risks & benefits of procedure and alternative therapy have been explained to the patient including but not limited to: allergic reaction, bleeding w/possible need for blood transfusion, infection, renal and vascular compromise, limb damage, arrhythmia, stroke, vessel dissection/perforation, Myocardial infarction, emergent CABG. Informed consent obtained and in chart.    no

## 2024-01-25 ENCOUNTER — TRANSCRIPTION ENCOUNTER (OUTPATIENT)
Age: 59
End: 2024-01-25

## 2024-01-25 ENCOUNTER — NON-APPOINTMENT (OUTPATIENT)
Age: 59
End: 2024-01-25

## 2024-02-07 ENCOUNTER — OUTPATIENT (OUTPATIENT)
Dept: OUTPATIENT SERVICES | Facility: HOSPITAL | Age: 59
LOS: 1 days | Discharge: HOME | End: 2024-02-07
Payer: MEDICAID

## 2024-02-07 DIAGNOSIS — Z90.49 ACQUIRED ABSENCE OF OTHER SPECIFIED PARTS OF DIGESTIVE TRACT: Chronic | ICD-10-CM

## 2024-02-07 DIAGNOSIS — I70.219 ATHEROSCLEROSIS OF NATIVE ARTERIES OF EXTREMITIES WITH INTERMITTENT CLAUDICATION, UNSPECIFIED EXTREMITY: ICD-10-CM

## 2024-02-07 LAB
ANION GAP SERPL CALC-SCNC: 11 MMOL/L — SIGNIFICANT CHANGE UP (ref 7–14)
BLD GP AB SCN SERPL QL: NEGATIVE — SIGNIFICANT CHANGE UP
BUN SERPL-MCNC: 19 MG/DL — SIGNIFICANT CHANGE UP (ref 7–23)
CALCIUM SERPL-MCNC: 8.9 MG/DL — SIGNIFICANT CHANGE UP (ref 8.4–10.5)
CHLORIDE SERPL-SCNC: 104 MMOL/L — SIGNIFICANT CHANGE UP (ref 98–107)
CO2 SERPL-SCNC: 24 MMOL/L — SIGNIFICANT CHANGE UP (ref 22–31)
CREAT SERPL-MCNC: 1.59 MG/DL — HIGH (ref 0.5–1.3)
EGFR: 50 ML/MIN/1.73M2 — LOW
GLUCOSE SERPL-MCNC: 88 MG/DL — SIGNIFICANT CHANGE UP (ref 70–99)
HCT VFR BLD CALC: 37.6 % — LOW (ref 39–50)
HGB BLD-MCNC: 13.1 G/DL — SIGNIFICANT CHANGE UP (ref 13–17)
MCHC RBC-ENTMCNC: 30.9 PG — SIGNIFICANT CHANGE UP (ref 27–34)
MCHC RBC-ENTMCNC: 34.8 GM/DL — SIGNIFICANT CHANGE UP (ref 32–36)
MCV RBC AUTO: 88.7 FL — SIGNIFICANT CHANGE UP (ref 80–100)
NRBC # BLD: 0 /100 WBCS — SIGNIFICANT CHANGE UP (ref 0–0)
NRBC # FLD: 0 K/UL — SIGNIFICANT CHANGE UP (ref 0–0)
PLATELET # BLD AUTO: 222 K/UL — SIGNIFICANT CHANGE UP (ref 150–400)
POTASSIUM SERPL-MCNC: 3.3 MMOL/L — LOW (ref 3.5–5.3)
POTASSIUM SERPL-SCNC: 3.3 MMOL/L — LOW (ref 3.5–5.3)
RBC # BLD: 4.24 M/UL — SIGNIFICANT CHANGE UP (ref 4.2–5.8)
RBC # FLD: 13.3 % — SIGNIFICANT CHANGE UP (ref 10.3–14.5)
RH IG SCN BLD-IMP: POSITIVE — SIGNIFICANT CHANGE UP
RH IG SCN BLD-IMP: POSITIVE — SIGNIFICANT CHANGE UP
SODIUM SERPL-SCNC: 139 MMOL/L — SIGNIFICANT CHANGE UP (ref 135–145)
WBC # BLD: 9.27 K/UL — SIGNIFICANT CHANGE UP (ref 3.8–10.5)
WBC # FLD AUTO: 9.27 K/UL — SIGNIFICANT CHANGE UP (ref 3.8–10.5)

## 2024-02-07 PROCEDURE — 75716 ARTERY X-RAYS ARMS/LEGS: CPT | Mod: 26,1L

## 2024-02-07 PROCEDURE — 93010 ELECTROCARDIOGRAM REPORT: CPT

## 2024-02-07 RX ORDER — POTASSIUM CHLORIDE 20 MEQ
40 PACKET (EA) ORAL ONCE
Refills: 0 | Status: COMPLETED | OUTPATIENT
Start: 2024-02-07 | End: 2024-02-07

## 2024-02-07 RX ORDER — CLOPIDOGREL BISULFATE 75 MG/1
75 TABLET, FILM COATED ORAL DAILY
Refills: 0 | Status: DISCONTINUED | OUTPATIENT
Start: 2024-02-08 | End: 2024-02-21

## 2024-02-07 RX ORDER — SODIUM CHLORIDE 9 MG/ML
3 INJECTION INTRAMUSCULAR; INTRAVENOUS; SUBCUTANEOUS EVERY 8 HOURS
Refills: 0 | Status: DISCONTINUED | OUTPATIENT
Start: 2024-02-07 | End: 2024-02-21

## 2024-02-07 RX ORDER — ASPIRIN/CALCIUM CARB/MAGNESIUM 324 MG
81 TABLET ORAL DAILY
Refills: 0 | Status: DISCONTINUED | OUTPATIENT
Start: 2024-02-08 | End: 2024-02-21

## 2024-02-07 RX ORDER — CLOPIDOGREL BISULFATE 75 MG/1
1 TABLET, FILM COATED ORAL
Qty: 90 | Refills: 7
Start: 2024-02-07

## 2024-02-07 RX ORDER — SODIUM CHLORIDE 9 MG/ML
500 INJECTION INTRAMUSCULAR; INTRAVENOUS; SUBCUTANEOUS
Refills: 0 | Status: DISCONTINUED | OUTPATIENT
Start: 2024-02-07 | End: 2024-02-21

## 2024-02-07 RX ADMIN — Medication 40 MILLIEQUIVALENT(S): at 15:40

## 2024-02-07 RX ADMIN — SODIUM CHLORIDE 3 MILLILITER(S): 9 INJECTION INTRAMUSCULAR; INTRAVENOUS; SUBCUTANEOUS at 16:03

## 2024-02-07 RX ADMIN — SODIUM CHLORIDE 100 MILLILITER(S): 9 INJECTION INTRAMUSCULAR; INTRAVENOUS; SUBCUTANEOUS at 10:15

## 2024-02-07 NOTE — CHART NOTE - NSCHARTNOTEFT_GEN_A_CORE
s/p peripheral angiogram with PTA/stent left iliac artery;  bedrest complete, ambulating and voiding without difficulty with right groin soft, nontender, no bleed, no hematoma, PT 1+  meds reviewed including need for compliance with ASA and Plavix and Plavix e-prescribed to agreed upon pharmacy  stable for discharge to home

## 2024-02-07 NOTE — H&P CARDIOLOGY - HISTORY OF PRESENT ILLNESS
58 year old male current smoker with HTN, hyperlipidemia, DM type 2 on chart (pt denies) PAD with stents who presented to his Cardiologist complaining of left thigh pain after carrying a heavy bag of tools.  Patient denies numbness, tingling, non-healing ulcers. Denies chest pain, SOB, syncope.  Admits to walking on flat ground with little to no leg pain. Underwent a reported arterial duplex of the legs with reported abnormal results.   In light of patients PAD history, symptoms and abnormal noninvasive test findings there is high suspicion for PAD progression. Patient is now referred to [facility] for a peripheral angiogram with possible PTA/stent.     Referring MD: Ralph

## 2024-02-12 NOTE — H&P CARDIOLOGY - NSICDXPASTMEDICALHX_GEN_ALL_CORE_FT
PAST MEDICAL HISTORY:  CAD (coronary artery disease)     Colonic mass     HTN (hypertension)     Ingrown toenail     Peripheral artery disease     Smoker     Type 2 diabetes mellitus     
There are no Wet Read(s) to document.

## 2024-02-23 NOTE — H&P ADULT - ENMT
Pre-op Instructions For Out-Patient Surgery    Medication Instructions:  Please stop herbs and any supplements now (includes vitamins and minerals).    Please contact your surgeon and prescribing physician for pre-op instructions for any blood thinners. Aspirin and Ibuprofen     If you have inhalers/aerosol treatments at home, please use them the morning of your surgery and bring the inhalers with you to the hospital.    Please take the following medications the morning of your surgery with a sip of water:    Propranolol     Surgery Instructions:  After midnight before surgery:  Do not eat or drink anything, including water, mints, gum, and hard candy.  You may brush your teeth without swallowing.  No smoking, chewing tobacco, or street drugs.    Please shower or bathe before surgery.  If you were given Surgical Scrub Chlorhexidine Gluconate Liquid (CHG), please shower the night before and the morning of your surgery following the detailed instructions you received during your pre-admission visit.     Please do not wear any cologne, lotion, powder, deodorant, jewelry, piercings, perfume, makeup, nail polish, hair accessories, or hair spray on the day of surgery.  Wear loose comfortable clothing.    Leave your valuables at home but bring a payment source for any after-surgery prescriptions you plan to fill at [facility].  Bring a storage case for any glasses/contacts.    An adult who is responsible for you MUST drive you home and should be with you for the first 24 hours after surgery.     If having out-patient knee and foot surgeries, please arrange for planned crutches, walker, or wheelchair before arriving to the hospital.    The Day of Surgery:  Arrive at [facility] Surgery Entrance at the time directed by your surgeon and check in at the desk.      If you have a living will or healthcare power of , please bring a copy.    You will be taken to the pre-op  holding area where you will be prepared for surgery.  A physical assessment will be performed by a nurse practitioner or house officer.  Your IV will be started and you will meet your anesthesiologist.    When you go to surgery, your family will be directed to the surgical waiting room, where the doctor should speak with them after your surgery.    After surgery, you will be taken to the recovery area.  When you are alert and stable, you will receive instructions and be prepared for discharge.     If you use a Bi-PAP or C-PAP machine, please bring it with you and leave it in the car in case it is needed in recovery room.         not examined

## 2024-03-15 ENCOUNTER — NON-APPOINTMENT (OUTPATIENT)
Age: 59
End: 2024-03-15

## 2024-03-25 ENCOUNTER — RX RENEWAL (OUTPATIENT)
Age: 59
End: 2024-03-25

## 2024-04-03 ENCOUNTER — NON-APPOINTMENT (OUTPATIENT)
Age: 59
End: 2024-04-03

## 2024-04-09 ENCOUNTER — APPOINTMENT (OUTPATIENT)
Dept: UROLOGY | Facility: CLINIC | Age: 59
End: 2024-04-09
Payer: MEDICAID

## 2024-04-09 PROCEDURE — 99214 OFFICE O/P EST MOD 30 MIN: CPT

## 2024-04-09 PROCEDURE — G2211 COMPLEX E/M VISIT ADD ON: CPT | Mod: NC,1L,95

## 2024-04-09 NOTE — PHYSICAL EXAM
[General Appearance - Well Developed] : well developed [General Appearance - Well Nourished] : well nourished [Oriented To Time, Place, And Person] : oriented to person, place, and time

## 2024-04-09 NOTE — HISTORY OF PRESENT ILLNESS
[Home] : at home, [unfilled] , at the time of the visit. [Medical Office: ([facility])___] : at the medical office located in  [Verbal consent obtained from patient] : the patient, [unfilled] [FreeTextEntry1] : The patient-doctor relationship has been established in a face to face fashion via real time video/audio HIPAA compliant communication using telemedicine software.  He has requested care to be assessed and treated through telemedicine. He understands that there may be limitations in this process and that he may need further follow up care in the office and/or hospital setting.  Very pleasant 58-year-old gentleman who presents for follow-up of BPH, erectile dysfunction.  He reports that sildenafil 100 mg is no longer significantly improving his erections.  He reports that he achieves tumescence, however his penis is not rigid and he is unable to have intercourse via use of Viagra 100 mg.  He is interested in trying a different medication for erectile dysfunction at this time.

## 2024-04-09 NOTE — ASSESSMENT
[FreeTextEntry1] : Very pleasant 58-year-old gentleman who presents for follow-up of erectile dysfunction, worsening -Stop Viagra 100 mg as this has been ineffective -Trial of Cialis 20 mg -I discussed the risks, benefits, alternatives, and possible side effects of Cialis (tadalafil) therapy with the patient, including but not limited to headache, flushing, upset stomach, blurry vision, change in color vision, vision loss, and priapism with the patient. -PSA 1.02; repeat in 3 months -Telehealth visit in 1 month to assess efficacy of the medication and follow-up in 3 months as planned  Patient is being seen today for evaluation and management of a chronic and longitudinal ongoing condition and I am of the primary treating physician

## 2024-04-25 ENCOUNTER — RX RENEWAL (OUTPATIENT)
Age: 59
End: 2024-04-25

## 2024-04-25 RX ORDER — TADALAFIL 20 MG/1
20 TABLET ORAL
Qty: 20 | Refills: 0 | Status: ACTIVE | COMMUNITY
Start: 2024-04-09 | End: 1900-01-01

## 2024-05-14 ENCOUNTER — APPOINTMENT (OUTPATIENT)
Dept: UROLOGY | Facility: CLINIC | Age: 59
End: 2024-05-14
Payer: MEDICAID

## 2024-05-14 DIAGNOSIS — N13.8 BENIGN PROSTATIC HYPERPLASIA WITH LOWER URINARY TRACT SYMPMS: ICD-10-CM

## 2024-05-14 DIAGNOSIS — N40.1 BENIGN PROSTATIC HYPERPLASIA WITH LOWER URINARY TRACT SYMPMS: ICD-10-CM

## 2024-05-14 DIAGNOSIS — Z12.5 ENCOUNTER FOR SCREENING FOR MALIGNANT NEOPLASM OF PROSTATE: ICD-10-CM

## 2024-05-14 PROCEDURE — 99214 OFFICE O/P EST MOD 30 MIN: CPT

## 2024-05-14 PROCEDURE — G2211 COMPLEX E/M VISIT ADD ON: CPT | Mod: NC,1L,95

## 2024-05-14 RX ORDER — PAPAVER/PHENTOLAMINE/ALPROSTAD 150-5-50
150-5-50 VIAL (EA) INTRACAVERNOSAL
Qty: 5 | Refills: 0 | Status: ACTIVE | COMMUNITY
Start: 2024-05-14 | End: 1900-01-01

## 2024-05-14 NOTE — HISTORY OF PRESENT ILLNESS
[Home] : at home, [unfilled] , at the time of the visit. [Medical Office: ([facility])___] : at the medical office located in  [Verbal consent obtained from patient] : the patient, [unfilled] [FreeTextEntry1] : The patient-doctor relationship has been established via real time audio communication using telemedicine software.  He has requested care to be assessed and treated through telemedicine. He understands that there may be limitations in this process and that he may need further follow up care in the office and/or hospital setting. I attempted to connect with the patient multiple times via telehealth video platforms, including [employer] and [employer], however he was unable to establish a video connection. He requested that care be provided via audio only.  Very pleasant 58-year-old gentleman who presents for follow-up of erectile dysfunction.  He reports that he recently tried tadalafil 20 mg, however this did not significantly improve his erections.  He reports that his penis is able to get firm, however not fully rigid.  He is unable to have intercourse.  He previously tried sildenafil 100 mg with improvement in his erections, however it subsequently lost efficacy.  He is interested in discussing additional options for management of erectile dysfunction at this time.

## 2024-05-14 NOTE — ASSESSMENT
[FreeTextEntry1] : Very pleasant 58-year-old gentleman who presents for follow-up of erectile dysfunction -Stop tadalafil as this has been ineffective -PSA 1.02 -Trial of Trimix -Extensive discussion of the risk of Trimix -Reviewed that if he has a sustained erection longer than 4 hours he must go to an emergency department immediately -Reviewed that he must alternate sides of injection -Reviewed that Trimix should not be used more than once per day and 3 times per week -Patient verbalized understanding of all of the above instructions -F/U in 1 week for first injection teaching  Patient is being seen today for evaluation and management of a chronic and longitudinal ongoing condition and I am of the primary treating physician

## 2024-05-22 ENCOUNTER — APPOINTMENT (OUTPATIENT)
Dept: UROLOGY | Facility: CLINIC | Age: 59
End: 2024-05-22
Payer: MEDICAID

## 2024-05-22 VITALS
HEART RATE: 82 BPM | HEIGHT: 67 IN | DIASTOLIC BLOOD PRESSURE: 77 MMHG | WEIGHT: 152 LBS | BODY MASS INDEX: 23.86 KG/M2 | OXYGEN SATURATION: 97 % | TEMPERATURE: 97.4 F | SYSTOLIC BLOOD PRESSURE: 117 MMHG

## 2024-05-22 DIAGNOSIS — N52.9 MALE ERECTILE DYSFUNCTION, UNSPECIFIED: ICD-10-CM

## 2024-05-22 PROCEDURE — 99214 OFFICE O/P EST MOD 30 MIN: CPT

## 2024-05-22 PROCEDURE — G2211 COMPLEX E/M VISIT ADD ON: CPT | Mod: NC,1L

## 2024-05-22 NOTE — HISTORY OF PRESENT ILLNESS
[FreeTextEntry1] : Very pleasant 58-year-old gentleman who presents for follow-up of erectile dysfunction.  Tadalafil 20 mg and sildenafil 100 mg did not significantly improve his erections.  He reports that his penis is able to get firm, however not fully rigid.  He is unable to have intercourse.  He presents today for Trimix teaching.

## 2024-05-22 NOTE — ASSESSMENT
[FreeTextEntry1] : Very pleasant 58-year-old gentleman who presents for follow-up of erectile dysfunction -First Trimix injection given in office today -Patient instructions and handout given to patient -Patient injected 0.2 cc of Trimix into the right corpora by himself -Extensive discussion of the risk of Trimix -Reviewed that if he has a sustained erection longer than 4 hours he must go to an emergency department immediately -Reviewed that he must alternate sides of injection -Reviewed that Trimix should not be used more than once per day and 3 times per week -Patient verbalized understanding of all of the above instructions -F/U in 1 month  Patient is being seen today for evaluation and management of a chronic and longitudinal ongoing condition and I am of the primary treating physician

## 2024-05-30 NOTE — ED PROVIDER NOTE - CONSTITUTIONAL, MLM
Order placed for service to behavioral health for counseling.  Please send patient phone number for psychiatry to schedule counseling for within OCD specialist   normal... Well appearing, awake, alert, oriented to person, place, time/situation and in no apparent distress.

## 2024-06-13 ENCOUNTER — RX RENEWAL (OUTPATIENT)
Age: 59
End: 2024-06-13

## 2024-06-13 RX ORDER — SILDENAFIL 100 MG/1
100 TABLET, FILM COATED ORAL
Qty: 90 | Refills: 0 | Status: ACTIVE | COMMUNITY
Start: 2021-08-13 | End: 1900-01-01

## 2024-06-25 ENCOUNTER — APPOINTMENT (OUTPATIENT)
Dept: UROLOGY | Facility: CLINIC | Age: 59
End: 2024-06-25

## 2024-07-12 ENCOUNTER — APPOINTMENT (OUTPATIENT)
Dept: UROLOGY | Facility: CLINIC | Age: 59
End: 2024-07-12

## 2024-07-30 ENCOUNTER — APPOINTMENT (OUTPATIENT)
Dept: UROLOGY | Facility: CLINIC | Age: 59
End: 2024-07-30
Payer: MEDICAID

## 2024-07-30 VITALS
OXYGEN SATURATION: 97 % | SYSTOLIC BLOOD PRESSURE: 130 MMHG | DIASTOLIC BLOOD PRESSURE: 91 MMHG | HEART RATE: 79 BPM | HEIGHT: 67 IN | BODY MASS INDEX: 23.86 KG/M2 | TEMPERATURE: 97.3 F | WEIGHT: 152 LBS

## 2024-07-30 DIAGNOSIS — N52.9 MALE ERECTILE DYSFUNCTION, UNSPECIFIED: ICD-10-CM

## 2024-07-30 DIAGNOSIS — N40.1 BENIGN PROSTATIC HYPERPLASIA WITH LOWER URINARY TRACT SYMPMS: ICD-10-CM

## 2024-07-30 DIAGNOSIS — Z12.5 ENCOUNTER FOR SCREENING FOR MALIGNANT NEOPLASM OF PROSTATE: ICD-10-CM

## 2024-07-30 DIAGNOSIS — N13.8 BENIGN PROSTATIC HYPERPLASIA WITH LOWER URINARY TRACT SYMPMS: ICD-10-CM

## 2024-07-30 PROCEDURE — G2211 COMPLEX E/M VISIT ADD ON: CPT | Mod: NC

## 2024-07-30 PROCEDURE — 99214 OFFICE O/P EST MOD 30 MIN: CPT

## 2024-07-30 NOTE — ASSESSMENT
[FreeTextEntry1] : Very pleasant 58 year old gentleman who presents for follow up of ED -PSA 1.02 from 2023; repeat -A1c -BMP -Continue Viagra 100 mg as needed- refill sent to the pharmacy  Patient is being seen today for evaluation and management of a chronic and longitudinal ongoing condition and I am of the primary treating physician

## 2024-07-30 NOTE — HISTORY OF PRESENT ILLNESS
[FreeTextEntry1] : Very pleasant 58-year-old gentleman who presents for follow-up of erectile dysfunction.  Tadalafil 20 mg and sildenafil 100 mg previously did not significantly improve his erections.  At last visit he was started on Trimix. He reports strong erections with the medication, however recently did not have the medication available and had Viagra. He used this and was able to get a rigid erection. He believes that his ED is very correlated with stress. Reduced stress recently. He wishes to try Viagra 100 mg again.

## 2024-07-31 LAB
ANION GAP SERPL CALC-SCNC: 12 MMOL/L
BUN SERPL-MCNC: 16 MG/DL
CALCIUM SERPL-MCNC: 9.3 MG/DL
CHLORIDE SERPL-SCNC: 107 MMOL/L
CO2 SERPL-SCNC: 22 MMOL/L
CREAT SERPL-MCNC: 1.35 MG/DL
EGFR: 61 ML/MIN/1.73M2
ESTIMATED AVERAGE GLUCOSE: 114 MG/DL
GLUCOSE SERPL-MCNC: 92 MG/DL
HBA1C MFR BLD HPLC: 5.6 %
POTASSIUM SERPL-SCNC: 4.1 MMOL/L
PSA SERPL-MCNC: 2.72 NG/ML
SODIUM SERPL-SCNC: 141 MMOL/L

## 2024-10-28 ENCOUNTER — RX RENEWAL (OUTPATIENT)
Age: 59
End: 2024-10-28

## 2025-01-07 ENCOUNTER — APPOINTMENT (OUTPATIENT)
Dept: GASTROENTEROLOGY | Facility: CLINIC | Age: 60
End: 2025-01-07

## 2025-01-27 ENCOUNTER — RX RENEWAL (OUTPATIENT)
Age: 60
End: 2025-01-27

## 2025-02-12 ENCOUNTER — APPOINTMENT (OUTPATIENT)
Dept: UROLOGY | Facility: CLINIC | Age: 60
End: 2025-02-12

## 2025-03-05 ENCOUNTER — APPOINTMENT (OUTPATIENT)
Dept: UROLOGY | Facility: CLINIC | Age: 60
End: 2025-03-05

## 2025-03-18 ENCOUNTER — INPATIENT (INPATIENT)
Facility: HOSPITAL | Age: 60
LOS: 2 days | Discharge: HOME | End: 2025-03-21
Attending: INTERNAL MEDICINE | Admitting: INTERNAL MEDICINE
Payer: MEDICARE

## 2025-03-18 VITALS
HEART RATE: 77 BPM | RESPIRATION RATE: 18 BRPM | TEMPERATURE: 99 F | DIASTOLIC BLOOD PRESSURE: 55 MMHG | OXYGEN SATURATION: 99 % | SYSTOLIC BLOOD PRESSURE: 83 MMHG | WEIGHT: 139.99 LBS

## 2025-03-18 DIAGNOSIS — Z90.49 ACQUIRED ABSENCE OF OTHER SPECIFIED PARTS OF DIGESTIVE TRACT: Chronic | ICD-10-CM

## 2025-03-18 DIAGNOSIS — I95.9 HYPOTENSION, UNSPECIFIED: ICD-10-CM

## 2025-03-18 LAB
ADD ON TEST-SPECIMEN IN LAB: SIGNIFICANT CHANGE UP
ALBUMIN SERPL ELPH-MCNC: 3.8 G/DL — SIGNIFICANT CHANGE UP (ref 3.3–5)
ALT FLD-CCNC: 17 U/L — SIGNIFICANT CHANGE UP (ref 4–41)
ANION GAP SERPL CALC-SCNC: 12 MMOL/L — SIGNIFICANT CHANGE UP (ref 7–14)
APPEARANCE UR: CLEAR — SIGNIFICANT CHANGE UP
APTT BLD: 44.8 SEC — HIGH (ref 24.5–35.6)
AST SERPL-CCNC: 14 U/L — SIGNIFICANT CHANGE UP (ref 4–40)
BASOPHILS # BLD AUTO: 0.06 K/UL — SIGNIFICANT CHANGE UP (ref 0–0.2)
BASOPHILS NFR BLD AUTO: 0.4 % — SIGNIFICANT CHANGE UP (ref 0–2)
BILIRUB SERPL-MCNC: 0.3 MG/DL — SIGNIFICANT CHANGE UP (ref 0.2–1.2)
BILIRUB UR-MCNC: NEGATIVE — SIGNIFICANT CHANGE UP
BUN SERPL-MCNC: 21 MG/DL — SIGNIFICANT CHANGE UP (ref 7–23)
CALCIUM SERPL-MCNC: 9.5 MG/DL — SIGNIFICANT CHANGE UP (ref 8.4–10.5)
CHLORIDE SERPL-SCNC: 99 MMOL/L — SIGNIFICANT CHANGE UP (ref 98–107)
CO2 SERPL-SCNC: 24 MMOL/L — SIGNIFICANT CHANGE UP (ref 22–31)
COLOR SPEC: YELLOW — SIGNIFICANT CHANGE UP
CREAT SERPL-MCNC: 1.63 MG/DL — HIGH (ref 0.5–1.3)
DIFF PNL FLD: NEGATIVE — SIGNIFICANT CHANGE UP
EGFR: 49 ML/MIN/1.73M2 — LOW
EGFR: 49 ML/MIN/1.73M2 — LOW
EOSINOPHIL # BLD AUTO: 0.32 K/UL — SIGNIFICANT CHANGE UP (ref 0–0.5)
EOSINOPHIL NFR BLD AUTO: 2 % — SIGNIFICANT CHANGE UP (ref 0–6)
FLUAV AG NPH QL: SIGNIFICANT CHANGE UP
GLUCOSE BLDC GLUCOMTR-MCNC: 102 MG/DL — HIGH (ref 70–99)
GLUCOSE BLDC GLUCOMTR-MCNC: 153 MG/DL — HIGH (ref 70–99)
GLUCOSE SERPL-MCNC: 99 MG/DL — SIGNIFICANT CHANGE UP (ref 70–99)
GLUCOSE UR QL: NEGATIVE MG/DL — SIGNIFICANT CHANGE UP
HCT VFR BLD CALC: 33.7 % — LOW (ref 39–50)
HGB BLD-MCNC: 12.1 G/DL — LOW (ref 13–17)
IANC: 12.41 K/UL — HIGH (ref 1.8–7.4)
INR BLD: 0.99 RATIO — SIGNIFICANT CHANGE UP (ref 0.85–1.16)
KETONES UR-MCNC: NEGATIVE MG/DL — SIGNIFICANT CHANGE UP
LEUKOCYTE ESTERASE UR-ACNC: ABNORMAL
LYMPHOCYTES # BLD AUTO: 2.05 K/UL — SIGNIFICANT CHANGE UP (ref 1–3.3)
MAGNESIUM SERPL-MCNC: 1.7 MG/DL — SIGNIFICANT CHANGE UP (ref 1.6–2.6)
MCHC RBC-ENTMCNC: 29.4 PG — SIGNIFICANT CHANGE UP (ref 27–34)
MCHC RBC-ENTMCNC: 35.9 G/DL — SIGNIFICANT CHANGE UP (ref 32–36)
MCV RBC AUTO: 82 FL — SIGNIFICANT CHANGE UP (ref 80–100)
MONOCYTES # BLD AUTO: 1.33 K/UL — HIGH (ref 0–0.9)
MONOCYTES NFR BLD AUTO: 8.2 % — SIGNIFICANT CHANGE UP (ref 2–14)
NEUTROPHILS # BLD AUTO: 12.41 K/UL — HIGH (ref 1.8–7.4)
NEUTROPHILS NFR BLD AUTO: 76.3 % — SIGNIFICANT CHANGE UP (ref 43–77)
NITRITE UR-MCNC: NEGATIVE — SIGNIFICANT CHANGE UP
NRBC # BLD AUTO: 0 K/UL — SIGNIFICANT CHANGE UP (ref 0–0)
NRBC # FLD: 0 K/UL — SIGNIFICANT CHANGE UP (ref 0–0)
NRBC BLD AUTO-RTO: 0 /100 WBCS — SIGNIFICANT CHANGE UP (ref 0–0)
NT-PROBNP SERPL-SCNC: 261 PG/ML — SIGNIFICANT CHANGE UP
PH UR: 7 — SIGNIFICANT CHANGE UP (ref 5–8)
PLATELET # BLD AUTO: 301 K/UL — SIGNIFICANT CHANGE UP (ref 150–400)
POTASSIUM SERPL-MCNC: 3.5 MMOL/L — SIGNIFICANT CHANGE UP (ref 3.5–5.3)
POTASSIUM SERPL-SCNC: 3.5 MMOL/L — SIGNIFICANT CHANGE UP (ref 3.5–5.3)
PROCALCITONIN SERPL-MCNC: 0.07 NG/ML — SIGNIFICANT CHANGE UP (ref 0.02–0.1)
PROT SERPL-MCNC: 6.6 G/DL — SIGNIFICANT CHANGE UP (ref 6–8.3)
PROTHROM AB SERPL-ACNC: 11.5 SEC — SIGNIFICANT CHANGE UP (ref 9.9–13.4)
RBC # BLD: 4.11 M/UL — LOW (ref 4.2–5.8)
RBC # FLD: 13.2 % — SIGNIFICANT CHANGE UP (ref 10.3–14.5)
RSV RNA NPH QL NAA+NON-PROBE: SIGNIFICANT CHANGE UP
SODIUM SERPL-SCNC: 135 MMOL/L — SIGNIFICANT CHANGE UP (ref 135–145)
SP GR SPEC: 1.01 — SIGNIFICANT CHANGE UP (ref 1–1.03)
TROPONIN T, HIGH SENSITIVITY RESULT: 11 NG/L — SIGNIFICANT CHANGE UP
TROPONIN T, HIGH SENSITIVITY RESULT: 7 NG/L — SIGNIFICANT CHANGE UP
UROBILINOGEN FLD QL: 0.2 MG/DL — SIGNIFICANT CHANGE UP (ref 0.2–1)
WBC # BLD: 16.25 K/UL — HIGH (ref 3.8–10.5)
WBC # FLD AUTO: 16.25 K/UL — HIGH (ref 3.8–10.5)

## 2025-03-18 PROCEDURE — 99285 EMERGENCY DEPT VISIT HI MDM: CPT | Mod: GC

## 2025-03-18 PROCEDURE — 71045 X-RAY EXAM CHEST 1 VIEW: CPT | Mod: 26

## 2025-03-18 RX ORDER — MAGNESIUM, ALUMINUM HYDROXIDE 200-200 MG
30 TABLET,CHEWABLE ORAL EVERY 4 HOURS
Refills: 0 | Status: DISCONTINUED | OUTPATIENT
Start: 2025-03-18 | End: 2025-03-21

## 2025-03-18 RX ORDER — CLOPIDOGREL BISULFATE 75 MG/1
75 TABLET, FILM COATED ORAL DAILY
Refills: 0 | Status: DISCONTINUED | OUTPATIENT
Start: 2025-03-18 | End: 2025-03-21

## 2025-03-18 RX ORDER — MELATONIN 5 MG
3 TABLET ORAL AT BEDTIME
Refills: 0 | Status: DISCONTINUED | OUTPATIENT
Start: 2025-03-18 | End: 2025-03-21

## 2025-03-18 RX ORDER — ONDANSETRON HCL/PF 4 MG/2 ML
4 VIAL (ML) INJECTION EVERY 8 HOURS
Refills: 0 | Status: DISCONTINUED | OUTPATIENT
Start: 2025-03-18 | End: 2025-03-21

## 2025-03-18 RX ORDER — ASPIRIN 325 MG
81 TABLET ORAL DAILY
Refills: 0 | Status: DISCONTINUED | OUTPATIENT
Start: 2025-03-18 | End: 2025-03-21

## 2025-03-18 RX ORDER — SODIUM CHLORIDE 9 G/1000ML
1000 INJECTION, SOLUTION INTRAVENOUS
Refills: 0 | Status: COMPLETED | OUTPATIENT
Start: 2025-03-18 | End: 2025-03-18

## 2025-03-18 RX ORDER — ATORVASTATIN CALCIUM 80 MG/1
40 TABLET, FILM COATED ORAL AT BEDTIME
Refills: 0 | Status: DISCONTINUED | OUTPATIENT
Start: 2025-03-18 | End: 2025-03-21

## 2025-03-18 RX ORDER — ACETAMINOPHEN 500 MG/5ML
650 LIQUID (ML) ORAL EVERY 6 HOURS
Refills: 0 | Status: DISCONTINUED | OUTPATIENT
Start: 2025-03-18 | End: 2025-03-21

## 2025-03-18 RX ADMIN — ATORVASTATIN CALCIUM 40 MILLIGRAM(S): 80 TABLET, FILM COATED ORAL at 22:01

## 2025-03-18 RX ADMIN — Medication 1000 MILLILITER(S): at 18:06

## 2025-03-18 RX ADMIN — SODIUM CHLORIDE 100 MILLILITER(S): 9 INJECTION, SOLUTION INTRAVENOUS at 20:16

## 2025-03-18 NOTE — ED ADULT NURSE REASSESSMENT NOTE - NS ED NURSE REASSESS COMMENT FT1
Pt received stable from day shift RN. Respirations even and unlabored. Pt states no discomfort  at this time. Awaiting diagnostic testing. TBA, awaiting orders and bed assignment. Safety precautions in place.

## 2025-03-18 NOTE — CONSULT NOTE ADULT - SUBJECTIVE AND OBJECTIVE BOX
MR#[id number]  PATIENT NAME:JERMAIN LYN    DATE OF SERVICE: 03-18-25 @ 16:08  Patient was seen and examined by Rishi Ventura MD on    03-18-25 @ 16:08 .  Interim events noted.Consultant notes ,Labs,Telemetry reviewed by me       HOSPITAL COURSE: HPI:      INTERIM EVENTS:Patient seen at bedside ,interim events noted.      PMH -reviewed admission note, no change since admission  HEART FAILURE: Acute[ ]Chronic[ ] Systolic[ ] Diastolic[ ] Combined Systolic and Diastolic[ ]  CAD[ ] CABG[ ] PCI[ ]  DEVICES[ ] PPM[ ] ICD[ ] ILR[ ]  ATRIAL FIBRILLATION[ ] Paroxysmal[ ] Permanent[ ] CHADS2-[  ]  CAESAR[ ] CKD1[ ] CKD2[ ] CKD3[ ] CKD4[ ] ESRD[ ]  COPD[ ] HTN[ ]   DM[ ] Type1[ ] Type 2[ ]   CVA[ ] Paresis[ ]    AMBULATION: Assisted[ ] Cane/walker[ ] Independent[ ]    MEDICATIONS  (STANDING):  aspirin enteric coated 81 milliGRAM(s) Oral daily  atorvastatin 40 milliGRAM(s) Oral at bedtime  clopidogrel Tablet 75 milliGRAM(s) Oral daily    MEDICATIONS  (PRN):  acetaminophen     Tablet .. 650 milliGRAM(s) Oral every 6 hours PRN Temp greater or equal to 38C (100.4F), Mild Pain (1 - 3)  aluminum hydroxide/magnesium hydroxide/simethicone Suspension 30 milliLiter(s) Oral every 4 hours PRN Dyspepsia  melatonin 3 milliGRAM(s) Oral at bedtime PRN Insomnia  ondansetron Injectable 4 milliGRAM(s) IV Push every 8 hours PRN Nausea and/or Vomiting            REVIEW OF SYSTEMS:  Constitutional: [ ] fever, [ ]weight loss,  [ ]fatigue [ ]weight gain  Eyes: [ ] visual changes  Respiratory: [ ]shortness of breath;  [ ] cough, [ ]wheezing, [ ]chills, [ ]hemoptysis  Cardiovascular: [ ] chest pain, [ ]palpitations, [ ]dizziness,  [ ]leg swelling[ ]orthopnea[ ]PND  Gastrointestinal: [ ] abdominal pain, [ ]nausea, [ ]vomiting,  [ ]diarrhea [ ]Constipation [ ]Melena  Genitourinary: [ ] dysuria, [ ] hematuria [ ]Saldana  Neurologic: [ ] headaches [ ] tremors[ ]weakness [ ]Paralysis Right[ ] Left[ ]  Skin: [ ] itching, [ ]burning, [ ] rashes  Endocrine: [ ] heat or cold intolerance  Musculoskeletal: [ ] joint pain or swelling; [ ] muscle, back, or extremity pain  Psychiatric: [ ] depression, [ ]anxiety, [ ]mood swings, or [ ]difficulty sleeping  Hematologic: [ ] easy bruising, [ ] bleeding gums    [ ] All remaining systems negative except as per above.   [ ]Unable to obtain.  [x] No change in ROS since admission      Vital Signs Last 24 Hrs  T(C): 36.8 (18 Mar 2025 20:10), Max: 37.1 (18 Mar 2025 13:39)  T(F): 98.2 (18 Mar 2025 20:10), Max: 98.8 (18 Mar 2025 13:39)  HR: 67 (18 Mar 2025 20:10) (65 - 77)  BP: 100/60 (18 Mar 2025 20:10) (83/55 - 110/73)  BP(mean): --  RR: 18 (18 Mar 2025 20:10) (15 - 18)  SpO2: 98% (18 Mar 2025 20:10) (98% - 100%)    Parameters below as of 18 Mar 2025 20:10  Patient On (Oxygen Delivery Method): room air      I&O's Summary      PHYSICAL EXAM:  General: No acute distress BMI-  HEENT: EOMI, PERRL  Neck: Supple, [ ] JVD  Lungs: Equal air entry bilaterally; [ ] rales [ ] wheezing [ ] rhonchi  Heart: Regular rate and rhythm; [x ] murmur   2/6 [ x] systolic [ ] diastolic [ ] radiation[ ] rubs [ ]  gallops  Abdomen: Nontender, bowel sounds present  Extremities: No clubbing, cyanosis, [ ] edema [ ]Pulses  equal and intact  Nervous system:  Alert & Oriented X3, no focal deficits  Psychiatric: Normal affect  Skin: No rashes or lesions    LABS:  03-18    135  |  99  |  21  ----------------------------<  99  3.5   |  24  |  1.63[H]    Ca    9.5      18 Mar 2025 15:00  Mg     1.70     03-18    TPro  6.6  /  Alb  3.8  /  TBili  0.3  /  DBili  x   /  AST  14  /  ALT  17  /  AlkPhos  145[H]  03-18    Creatinine Trend: 1.63<--                        12.1   16.25 )-----------( 301      ( 18 Mar 2025 15:00 )             33.7     PT/INR - ( 18 Mar 2025 15:00 )   PT: 11.5 sec;   INR: 0.99 ratio         PTT - ( 18 Mar 2025 15:00 )  PTT:44.8 sec

## 2025-03-18 NOTE — ED ADULT TRIAGE NOTE - CHIEF COMPLAINT QUOTE
pt brought in by EMS from MD office for a near syncope while having blood drawn. pt arrives hypotensive. pt denies chest pain, n/v/d, fever or chills. pt complaining of SOB.

## 2025-03-18 NOTE — ED PROVIDER NOTE - NS ED ROS FT
"Name: Romina Joseph [facility] Number: [id number]  Date of Treatment: 12/18/2018   Diagnosis:   Encounter Diagnoses   Name Primary?    Chronic left-sided low back pain without sciatica Yes    Joint stiffness     Muscle weakness        Time in: 1:00  Time Out: 2:00    Total Treatment Time: 60 minutes      Subjective:    Pt states the back continues to be sore but is doing better overall. States she is 80% of normal.   Patient reports their pain to be 9/10 on a 0-10 scale with 0 being no pain and 10 being the worst pain imaginable. States she is sore from being on her feet a lot working.     Pain:  Location: left lumbar     Activities Which Increase Pain: lying flat, lying positions, transitional movements  Activities Which Decrease Pain: muscle relaxer  Pain Scale: 0/10 at best 9/10 now  9/10 at worst        Objective      Palpation: mild tenderness on palpation of L3-S1 IVD and paraspinal, left piriformis.      Range of Motion/Strength:       Lumbar AROM: Degrees   Flexion 55 to -8   Extension -8 deg to 10 deg   Right side bending 8   Left side bending 8   Lumbar quadrant reveals: increased discomfort with extension and bilateral lateral flexion     AROM: Bilateral LE: Grossly WFL, impaired bilateral hip flexion due to possible hip joint restriction  MMT:  Right LE: 4   Left LE: 4-  Abdominal Strength: 3+/5       Objective      Treatment:   Pt received therapeutic exercises to develop strength, endurance, ROM, flexibility, posture and core stabilization for 45 minutes including:    LTR x 20  Piriformis stretch 20 sec x 4: hold  Pelvic tilts x 20  TrA recruitment x 5 sec x 2 x 10  Hip flexor/quad stretch supine with belt 30 sec x 4  TrA recruitment x 5 sec x 10 with hip flex/ext  +TrA activation with BKFO x 15 ea.   +TrA activation with Melvin UE flexion x Yellow TB x 10 ea.  +TrA activation with Uni UE flexion x Yellow TB x 10 ea.  Ball squeeze 2 x 10 x 5" hold  Supine hip abd 3 x 10 Green TB  Anti rotation 2 " x 10 Green TB   Bilateral shoulder extension with Red TB 3 x 10    Pt received manual therapy to improve mobility for 10 minutes:  MET right innominate posterior np  Shotgun:np  Prone gross rotation lumbar glides grade 2    Pt was instructed in and given a home exercise program consisting of the above activities.     Assessment     Mild c/o increased discomfort with prescribed therex. No sign of pelvic obliquity.  Improved functional lumbar AROM in all planes.   Pt demonstrates a good understanding of the education provided and a good return demonstration of activities. Pt  Requires skilled supervision to complete and progress home program.    Medical necessity is demonstrated by the following IMPAIRMENTS:  -pain   -decreased range of motion/flexibility   -decreased muscle strength   -impaired function -    -decreased ADL ability  -decreased recreational ability     Patient is making good progress towards established goals.    Short Term Goals (4 Weeks):   Updated 12/18/18  Not MET  1.Pt to increase strength by a 1/2 grade of muscles test to allow for improvement in functional activities such as performing chores.  2.Pt to improve range of motion by 25% to allow for improved functional mobility to allow for improvement in IADLs.  MET  3.Pt to report compliance with HEP and demonstrate proper exercise technique to PT to show competence with self management of condition.  4.Decrease pain by 25% during functional activities. Not MET    Long Term Goals (12 Weeks):   1. Increase ROM to allow improved joint biomechanics during functional activities.   2.Increase trunk and lower extremity strength to within normal limits during functional activities.   3. Independent with home exercise program.   4. Full return to functional activities with manageable complaints.  5. Patient to demonstrate improved posture and body mechanics.  6. Decrease pain by 75% during functional activities.    CMS Impairment/Limitation/Restriction  for FOTO Lumbar Spine Survey  Status Limitation G-Code CMS Severity Modifier  Intake 75% 25% Current Status CJ - At least 20 percent but less than 40 percent  Predicted 87% 13% Goal Status+ CI - At least 1 percent but less than 20 percent    Plan     Continue with established Plan of Care towards PT goals. Will re-assess next visit for possible d/c planning.     Certification Period: 11/13/18 to 2/13/19    Recommended Treatment Plan: 2-3 times per week for 12 weeks with treatments to consist of:  Neuromuscular and postural re-education,  training, therapeutic exercise, therapeutic activities,balance training, manual therapy, soft tissue mobilization, ROM exercises, Cardiovascular, Postural stabilization, manual traction, spinal mobilization, moist heat, cryotherapy, dry needling, electrical stimulation, kinesiotaping, ultrasound, home exercise education and planning.      Therapist: Juve German, PT   12/18/2018   all other ROS negative unless indicated in HPI

## 2025-03-18 NOTE — ED ADULT NURSE NOTE - OBJECTIVE STATEMENT
pt received to room 3, aox4. pt sent to ED after having a near syncopal episode when getting his blood drawn.  pt states he felt SOB and felt like he was going to pass out.  pt appears in no acute distress.  states upon arrival to ED symptoms subsided.  pt placed on tele, v/s as noted.  awaiting MD stevens

## 2025-03-18 NOTE — ED PROVIDER NOTE - PROGRESS NOTE DETAILS
Macrina, PGY3: Patient signed out to me by day team.    Katiana - 59yM [3]  HTN, HLD, T2DM, PAD  1w lightheaded, dizzy while upstairs  Milo Loza for stress test > nearsync + n/v  hypotensive (90/60s), collapsible IVC   [ ] TBA    On chart review, patient has a history of HFrEF (EF 40-45%, 2028), CVA, CAD s/p 1-2 stent(s), and PVD s/p HARJINDER.  On arrival, patient had soft blood pressures and was given a liter of fluid because his IVC was collapsible.  Spoke to Dr. iMlo Loza stated the patient was supposed to stop taking his chlorthalidone, but has continued to do so.  Patient also do strenuous work and was planning for stress test today.  Dr. Loza concern the patient is primarily fluid down.  After evaluating patient, plan for admission due to patient's poor medical literacy and concern for persistent hypotension.  Patient also may get stress/echo inpatient.  Spoke to Dr. Villalobos who asked for patient to be admitted to Dr. Rishi Ventura (covering physician).  Patient blood pressures are improved (90s/80s) after 1 L of fluid.  Will give an additional 100 cc/h of LR.  Uncertain source for white count (WBC 16.25), so will obtain urinalysis.  Troponin is 11, repeat pending. Khalil: s/o from Dr Gardner to f/u labs and admit for syncope. likely needs stress testing and further cardiac work up. labs unremarkable.

## 2025-03-18 NOTE — ED PROVIDER NOTE - CLINICAL SUMMARY MEDICAL DECISION MAKING FREE TEXT BOX
Jose Martin Paiz MD, PGY3  59-year-old male with past medical history of hypertension, hyperlipidemia, type 2 diabetes, peripheral artery disease, history of colon cancer status post resection presenting to emergency department after near syncopal episode at cardiologist's office today.  Patient states starting 1 week ago he had an episode where he was walking upstairs and felt very short of breath, lightheaded, dizzy, had to sit down and symptoms self resolved.  Also had some shortness of breath during that episode.  Patient then made appointment with cardiologist Dr. Milo Loza and was in his office today, about to have a stress test when he stood up and again felt very lightheaded and dizzy, became diaphoretic and had episode of nausea and vomiting.  Patient was also found to be hypotensive 90s over 60s, told to go to emergency department for further evaluation.  Patient states he is currently feeling better now and lying down.  Currently denies fever, headache, vision change, chest pain, shortness of breath, abdominal pain, diarrhea, extremity edema, rash.  States he has not yet eaten or drank anything today.  Patient does take blood pressure medications, losartan and a second agent he does not recall name of which she took this morning.  No recent changes in dosage.    Gen: No acute distress  HEENT: EOMI, no nasal discharge, mucous membranes moist  CV: RRR, +S1/S2, no M/R/G, 2+ radial pulses b/l  Resp: CTAB, no W/R/R, no accessory muscle use, no increased work of breathing  GI: Abdomen soft non-distended, NTTP  MSK: No open wounds, no bruising, no LE edema  Neuro: A&Ox4, following commands, moving all four extremities spontaneously  Psych: appropriate mood    In emergency department patient noticed to have soft BP 90 over 60s, otherwise hemodynamically stable and afebrile.  Patient well-appearing in no acute distress.  Exam unremarkable and as noted above.  Bedside ultrasound showing fully collapsible IVC.  Differential including but not limited to vasovagal syncope, arrhythmia, hypovolemia, anemia, electrolyte abnormalities, structural heart disease, ACS.  Plan to obtain labs, chest x-ray, give IV fluids.  Will attempt to reach out to patient's cardiologist.  Will reassess, disposition pending workup. Gardner: 59-year-old male with past medical history of hypertension, hyperlipidemia, type 2 diabetes, peripheral artery disease, history of colon cancer status post resection presenting to emergency department after near syncopal episode at cardiologist's office today.  Patient states starting 1 week ago he had an episode where he was walking upstairs and felt very short of breath, lightheaded, dizzy, had to sit down and symptoms self resolved.  Also had some shortness of breath during that episode.  Patient then made appointment with cardiologist Dr. Milo Loza and was in his office today, about to have a stress test when he stood up and again felt very lightheaded and dizzy, became diaphoretic and had episode of nausea and vomiting.  Patient was also found to be hypotensive 90s over 60s, told to go to emergency department for further evaluation.  Patient states he is currently feeling better now and lying down.  Currently denies fever, headache, vision change, chest pain, shortness of breath, abdominal pain, diarrhea, extremity edema, rash.  States he has not yet eaten or drank anything today.  Patient does take blood pressure medications, losartan and a second agent he does not recall name of which she took this morning.  No recent changes in dosage.    Gen: No acute distress  HEENT: EOMI, no nasal discharge, mucous membranes moist  CV: RRR, +S1/S2, no M/R/G, 2+ radial pulses b/l  Resp: CTAB, no W/R/R, no accessory muscle use, no increased work of breathing  GI: Abdomen soft non-distended, NTTP  MSK: No open wounds, no bruising, no LE edema  Neuro: A&Ox4, following commands, moving all four extremities spontaneously  Psych: appropriate mood    In emergency department patient noticed to have soft BP 90 over 60s, otherwise hemodynamically stable and afebrile.  Patient well-appearing in no acute distress.  Exam unremarkable and as noted above.  Bedside ultrasound showing fully collapsible IVC.  Differential including but not limited to vasovagal syncope, arrhythmia, hypovolemia, anemia, electrolyte abnormalities, structural heart disease, ACS.  Plan to obtain labs, chest x-ray, give IV fluids.  Will attempt to reach out to patient's cardiologist.  Will reassess, disposition pending workup.

## 2025-03-19 ENCOUNTER — RESULT REVIEW (OUTPATIENT)
Age: 60
End: 2025-03-19

## 2025-03-19 ENCOUNTER — APPOINTMENT (OUTPATIENT)
Dept: UROLOGY | Facility: CLINIC | Age: 60
End: 2025-03-19

## 2025-03-19 DIAGNOSIS — R42 DIZZINESS AND GIDDINESS: ICD-10-CM

## 2025-03-19 DIAGNOSIS — I25.10 ATHEROSCLEROTIC HEART DISEASE OF NATIVE CORONARY ARTERY WITHOUT ANGINA PECTORIS: ICD-10-CM

## 2025-03-19 DIAGNOSIS — N17.9 ACUTE KIDNEY FAILURE, UNSPECIFIED: ICD-10-CM

## 2025-03-19 DIAGNOSIS — Z79.899 OTHER LONG TERM (CURRENT) DRUG THERAPY: ICD-10-CM

## 2025-03-19 DIAGNOSIS — I10 ESSENTIAL (PRIMARY) HYPERTENSION: ICD-10-CM

## 2025-03-19 DIAGNOSIS — Z29.9 ENCOUNTER FOR PROPHYLACTIC MEASURES, UNSPECIFIED: ICD-10-CM

## 2025-03-19 LAB
ALBUMIN SERPL ELPH-MCNC: 3.6 G/DL — SIGNIFICANT CHANGE UP (ref 3.3–5)
ALP SERPL-CCNC: 143 U/L — HIGH (ref 40–120)
ANION GAP SERPL CALC-SCNC: 11 MMOL/L — SIGNIFICANT CHANGE UP (ref 7–14)
AST SERPL-CCNC: 13 U/L — SIGNIFICANT CHANGE UP (ref 4–40)
BASOPHILS # BLD AUTO: 0.06 K/UL — SIGNIFICANT CHANGE UP (ref 0–0.2)
BASOPHILS NFR BLD AUTO: 0.6 % — SIGNIFICANT CHANGE UP (ref 0–2)
BILIRUB SERPL-MCNC: 0.4 MG/DL — SIGNIFICANT CHANGE UP (ref 0.2–1.2)
BUN SERPL-MCNC: 21 MG/DL — SIGNIFICANT CHANGE UP (ref 7–23)
CALCIUM SERPL-MCNC: 9.1 MG/DL — SIGNIFICANT CHANGE UP (ref 8.4–10.5)
CHLORIDE SERPL-SCNC: 105 MMOL/L — SIGNIFICANT CHANGE UP (ref 98–107)
CO2 SERPL-SCNC: 21 MMOL/L — LOW (ref 22–31)
CREAT ?TM UR-MCNC: 67 MG/DL — SIGNIFICANT CHANGE UP
CREAT SERPL-MCNC: 1.25 MG/DL — SIGNIFICANT CHANGE UP (ref 0.5–1.3)
EGFR: 67 ML/MIN/1.73M2 — SIGNIFICANT CHANGE UP
EGFR: 67 ML/MIN/1.73M2 — SIGNIFICANT CHANGE UP
EOSINOPHIL # BLD AUTO: 0.3 K/UL — SIGNIFICANT CHANGE UP (ref 0–0.5)
EOSINOPHIL NFR BLD AUTO: 2.8 % — SIGNIFICANT CHANGE UP (ref 0–6)
GLUCOSE SERPL-MCNC: 101 MG/DL — HIGH (ref 70–99)
HCT VFR BLD CALC: 32 % — LOW (ref 39–50)
HGB BLD-MCNC: 11.4 G/DL — LOW (ref 13–17)
IANC: 6.92 K/UL — SIGNIFICANT CHANGE UP (ref 1.8–7.4)
IMM GRANULOCYTES NFR BLD AUTO: 0.4 % — SIGNIFICANT CHANGE UP (ref 0–0.9)
LYMPHOCYTES # BLD AUTO: 24.3 % — SIGNIFICANT CHANGE UP (ref 13–44)
MAGNESIUM SERPL-MCNC: 1.5 MG/DL — LOW (ref 1.6–2.6)
MCHC RBC-ENTMCNC: 29.4 PG — SIGNIFICANT CHANGE UP (ref 27–34)
MCHC RBC-ENTMCNC: 35.6 G/DL — SIGNIFICANT CHANGE UP (ref 32–36)
MONOCYTES NFR BLD AUTO: 8.4 % — SIGNIFICANT CHANGE UP (ref 2–14)
NEUTROPHILS # BLD AUTO: 6.92 K/UL — SIGNIFICANT CHANGE UP (ref 1.8–7.4)
NEUTROPHILS NFR BLD AUTO: 63.5 % — SIGNIFICANT CHANGE UP (ref 43–77)
NRBC # BLD AUTO: 0 K/UL — SIGNIFICANT CHANGE UP (ref 0–0)
NRBC # FLD: 0 K/UL — SIGNIFICANT CHANGE UP (ref 0–0)
NRBC BLD AUTO-RTO: 0 /100 WBCS — SIGNIFICANT CHANGE UP (ref 0–0)
PHOSPHATE SERPL-MCNC: 2.7 MG/DL — SIGNIFICANT CHANGE UP (ref 2.5–4.5)
PLATELET # BLD AUTO: 282 K/UL — SIGNIFICANT CHANGE UP (ref 150–400)
POTASSIUM SERPL-MCNC: 3.2 MMOL/L — LOW (ref 3.5–5.3)
POTASSIUM SERPL-SCNC: 3.2 MMOL/L — LOW (ref 3.5–5.3)
PROT SERPL-MCNC: 6.3 G/DL — SIGNIFICANT CHANGE UP (ref 6–8.3)
SODIUM SERPL-SCNC: 137 MMOL/L — SIGNIFICANT CHANGE UP (ref 135–145)
SODIUM UR-SCNC: 124 MMOL/L — SIGNIFICANT CHANGE UP
UUN UR-MCNC: 480.9 MG/DL — SIGNIFICANT CHANGE UP
WBC # BLD: 10.87 K/UL — HIGH (ref 3.8–10.5)
WBC # FLD AUTO: 10.87 K/UL — HIGH (ref 3.8–10.5)

## 2025-03-19 PROCEDURE — 99223 1ST HOSP IP/OBS HIGH 75: CPT

## 2025-03-19 PROCEDURE — 93306 TTE W/DOPPLER COMPLETE: CPT | Mod: 26

## 2025-03-19 RX ORDER — CILOSTAZOL 50 MG/1
1 TABLET ORAL
Refills: 6 | DISCHARGE

## 2025-03-19 RX ORDER — ATORVASTATIN CALCIUM 80 MG/1
1 TABLET, FILM COATED ORAL
Refills: 2 | DISCHARGE

## 2025-03-19 RX ORDER — INFLUENZA A VIRUS A/IDAHO/07/2018 (H1N1) ANTIGEN (MDCK CELL DERIVED, PROPIOLACTONE INACTIVATED, INFLUENZA A VIRUS A/INDIANA/08/2018 (H3N2) ANTIGEN (MDCK CELL DERIVED, PROPIOLACTONE INACTIVATED), INFLUENZA B VIRUS B/SINGAPORE/INFTT-16-0610/2016 ANTIGEN (MDCK CELL DERIVED, PROPIOLACTONE INACTIVATED), INFLUENZA B VIRUS B/IOWA/06/2017 ANTIGEN (MDCK CELL DERIVED, PROPIOLACTONE INACTIVATED) 15; 15; 15; 15 UG/.5ML; UG/.5ML; UG/.5ML; UG/.5ML
0.5 INJECTION, SUSPENSION INTRAMUSCULAR ONCE
Refills: 0 | Status: DISCONTINUED | OUTPATIENT
Start: 2025-03-19 | End: 2025-03-21

## 2025-03-19 RX ORDER — LOSARTAN POTASSIUM 100 MG/1
0 TABLET, FILM COATED ORAL
Qty: 0 | Refills: 6 | DISCHARGE

## 2025-03-19 RX ORDER — MAGNESIUM SULFATE 500 MG/ML
1 SYRINGE (ML) INJECTION ONCE
Refills: 0 | Status: COMPLETED | OUTPATIENT
Start: 2025-03-19 | End: 2025-03-19

## 2025-03-19 RX ORDER — ASPIRIN 325 MG
0 TABLET ORAL
Qty: 0 | Refills: 3 | DISCHARGE

## 2025-03-19 RX ORDER — HEPARIN SODIUM 1000 [USP'U]/ML
5000 INJECTION INTRAVENOUS; SUBCUTANEOUS EVERY 12 HOURS
Refills: 0 | Status: DISCONTINUED | OUTPATIENT
Start: 2025-03-19 | End: 2025-03-21

## 2025-03-19 RX ORDER — CARVEDILOL 3.12 MG/1
0 TABLET, FILM COATED ORAL
Qty: 0 | Refills: 6 | DISCHARGE

## 2025-03-19 RX ADMIN — Medication 100 GRAM(S): at 22:12

## 2025-03-19 RX ADMIN — HEPARIN SODIUM 5000 UNIT(S): 1000 INJECTION INTRAVENOUS; SUBCUTANEOUS at 18:50

## 2025-03-19 RX ADMIN — Medication 40 MILLIEQUIVALENT(S): at 22:11

## 2025-03-19 RX ADMIN — HEPARIN SODIUM 5000 UNIT(S): 1000 INJECTION INTRAVENOUS; SUBCUTANEOUS at 07:39

## 2025-03-19 RX ADMIN — ATORVASTATIN CALCIUM 40 MILLIGRAM(S): 80 TABLET, FILM COATED ORAL at 22:11

## 2025-03-19 RX ADMIN — Medication 81 MILLIGRAM(S): at 12:26

## 2025-03-19 RX ADMIN — CLOPIDOGREL BISULFATE 75 MILLIGRAM(S): 75 TABLET, FILM COATED ORAL at 12:26

## 2025-03-19 NOTE — PATIENT PROFILE ADULT - FALL HARM RISK - HARM RISK INTERVENTIONS

## 2025-03-19 NOTE — PHARMACOTHERAPY INTERVENTION NOTE - COMMENTS
Medication history is complete. Medication list updated in Outpatient Medication Record (OMR) via [facility], [place], Dr. Milo Loza, and Dr. Dany Bauman's office. Upon the interview, the patient did not know his medications or dosages and deferred us to contact his doctors. All the medications were recent fills except Atorvastatin, which was filled for a 90 day supply in 10/2024.     Home Medications:  ASPIRIN EC 81 MG TABLET: TAKE 1 TABLET BY MOUTH EVERY DAY   ATORVASTATIN 40 MG TABLET: 1 tab(s) orally once a day (last filled 10/2024 for 90 days)   CARVEDILOL 12.5 MG TABLET: TAKE 1 TABLET (12.5 MG) BY MOUTH 2 TIMES PER DAY WITH FOOD   CHLORTHALIDONE 25 MG TABLET: TAKE 1 TABLET BY MOUTH DAILY IN THE MORNING WITH FOOD  CILOSTAZOL 100 MG TABLET: 1 tab(s) orally 2 times a day   CLOPIDOGREL 75 MG TABLET: TAKE 1 TABLET BY MOUTH EVERY DAY   LOSARTAN POTASSIUM 50 MG TAB: TAKE 1 TABLET BY MOUTH EVERY DAY  sildenafil 100 mg oral tablet: 1 tab(s) orally 1 hour before needed     Please call spectra [phone] if you have any questions.  Medication history is complete. Medication list updated in Outpatient Medication Record (OMR) via [facility], [place], Dr. Milo Loza, and Dr. Dany Bauman's office. Upon the interview, the patient did not know his medications or dosages and deferred us to contact his doctors. All the medications were recent fills except Atorvastatin, which was filled for a 90 day supply in 10/2024.   Spoke with ACP provider to notify OMR updated.     Home Medications:  ASPIRIN EC 81 MG TABLET: TAKE 1 TABLET BY MOUTH EVERY DAY   ATORVASTATIN 40 MG TABLET: 1 tab(s) orally once a day (last filled 10/2024 for 90 days)   CARVEDILOL 12.5 MG TABLET: TAKE 1 TABLET (12.5 MG) BY MOUTH 2 TIMES PER DAY WITH FOOD   CHLORTHALIDONE 25 MG TABLET: TAKE 1 TABLET BY MOUTH DAILY IN THE MORNING WITH FOOD  CILOSTAZOL 100 MG TABLET: 1 tab(s) orally 2 times a day   CLOPIDOGREL 75 MG TABLET: TAKE 1 TABLET BY MOUTH EVERY DAY   LOSARTAN POTASSIUM 50 MG TAB: TAKE 1 TABLET BY MOUTH EVERY DAY  sildenafil 100 mg oral tablet: 1 tab(s) orally 1 hour before needed     Please call spectra [phone] if you have any questions.

## 2025-03-19 NOTE — PATIENT PROFILE ADULT - NSPROPTRIGHTSUPPORTPERSON_GEN_A_NUR
[FreeTextEntry1] : \par Patient reports that since starting levodopa, she has no cramps in her legs and is walking longer periods of the day flat footed. She also has been doing PT which may be contributing to her benefit. Had occasional nausea from l-dopa that is now resolved and takes it three times per day. \par \par Brain MRI was unremakable\par C.SPine MRI - c4/5 disc bulge with no cord compression\par \par Meds\par sinemet  1 tab TID\par OCPs declines

## 2025-03-19 NOTE — CONSULT NOTE ADULT - SUBJECTIVE AND OBJECTIVE BOX
Patient is a 59y old  Male who presents with a chief complaint of syncope (19 Mar 2025 00:23)      HPI:  This is a 60 y/o M with pmhx of HTN, HLD, presented for presyncope episode. The patient reported that he was at his cardiologist's office today and was getting a stress test. While he was running on the treadmill he felt lightheaded and blacking out. No complete LOC noted. The patient also noted that at home when he stand up and walks up the stairs, would get lightheaded with feeling of passing out. The patient denies chest pain at this time. In the cardiologist's office, he was found to have systolic BP in the 90s. He was send to the ED for evaluation.    Of note, as per ED, patient was told to stop taking his BP meds however the patient continued to take them.  (19 Mar 2025 00:23)      PAST MEDICAL & SURGICAL HISTORY:  HTN (hypertension)      Colonic mass      Ingrown toenail      Smoker      Type 2 diabetes mellitus      Peripheral artery disease      CAD (coronary artery disease)      S/P laparoscopic-assisted sigmoidectomy          PREVIOUS DIAGNOSTIC TESTING:      ECHO  FINDINGS:    STRESS  FINDINGS:    CATHETERIZATION  FINDINGS:    MEDICATIONS  (STANDING):  aspirin enteric coated 81 milliGRAM(s) Oral daily  atorvastatin 40 milliGRAM(s) Oral at bedtime  clopidogrel Tablet 75 milliGRAM(s) Oral daily  heparin   Injectable 5000 Unit(s) SubCutaneous every 12 hours  influenza   Vaccine 0.5 milliLiter(s) IntraMuscular once    MEDICATIONS  (PRN):  acetaminophen     Tablet .. 650 milliGRAM(s) Oral every 6 hours PRN Temp greater or equal to 38C (100.4F), Mild Pain (1 - 3)  aluminum hydroxide/magnesium hydroxide/simethicone Suspension 30 milliLiter(s) Oral every 4 hours PRN Dyspepsia  melatonin 3 milliGRAM(s) Oral at bedtime PRN Insomnia  ondansetron Injectable 4 milliGRAM(s) IV Push every 8 hours PRN Nausea and/or Vomiting      FAMILY HISTORY:  No pertinent family history in first degree relatives        SOCIAL HISTORY:    CIGARETTES:    ALCOHOL:    REVIEW OF SYSTEMS:  CONSTITUTIONAL: No fever, weight loss, or fatigue  EYES: No eye pain, visual disturbances, or discharge  ENMT:  No difficulty hearing, tinnitus, vertigo; No sinus or throat pain  NECK: No pain or stiffness  RESPIRATORY: No cough, wheezing, chills or hemoptysis; No shortness of breath  CARDIOVASCULAR: No chest pain, palpitations, dizziness, or leg swelling  GASTROINTESTINAL: No abdominal or epigastric pain. No nausea, vomiting, or hematemesis; No diarrhea or constipation. No melena or hematochezia.  GENITOURINARY: No dysuria, frequency, hematuria, or incontinence  NEUROLOGICAL: No headaches, memory loss, loss of strength, numbness, or tremors  SKIN: No itching, burning, rashes, or lesions   LYMPH NODES: No enlarged glands  ENDOCRINE: No heat or cold intolerance; No hair loss  MUSCULOSKELETAL: No joint pain or swelling; No muscle, back, or extremity pain  PSYCHIATRIC: No depression, anxiety, mood swings, or difficulty sleeping  HEME/LYMPH: No easy bruising, or bleeding gums  ALLERY AND IMMUNOLOGIC: No hives or eczema    Vital Signs Last 24 Hrs  T(C): 36.9 (20 Mar 2025 05:27), Max: 36.9 (20 Mar 2025 05:27)  T(F): 98.5 (20 Mar 2025 05:27), Max: 98.5 (20 Mar 2025 05:27)  HR: 62 (20 Mar 2025 05:27) (62 - 77)  BP: 137/95 (20 Mar 2025 05:27) (117/87 - 142/82)  BP(mean): --  RR: 18 (20 Mar 2025 05:27) (17 - 18)  SpO2: 100% (20 Mar 2025 05:27) (97% - 100%)    Parameters below as of 20 Mar 2025 01:30  Patient On (Oxygen Delivery Method): room air          PHYSICAL EXAM:  GENERAL: NAD, well-groomed, well-developed  HEAD:  Atraumatic, Normocephalic  EYES: EOMI, PERRLA, conjunctiva and sclera clear  ENMT: No tonsillar erythema, exudates, or enlargement; Moist mucous membranes, Good dentition, No lesions  NECK: Supple, No JVD, Normal thyroid  NERVOUS SYSTEM:  Alert & Oriented X3, Good concentration; Motor Strength 5/5 B/L upper and lower extremities; DTRs 2+ intact and symmetric  CHEST/LUNG: Clear to percussion bilaterally; No rales, rhonchi, wheezing, or rubs  HEART: Regular rate and rhythm; No murmurs, rubs, or gallops  ABDOMEN: Soft, Nontender, Nondistended; Bowel sounds present  EXTREMITIES:  2+ Peripheral Pulses, No clubbing, cyanosis, or edema  LYMPH: No lymphadenopathy noted  SKIN: No rashes or lesions      INTERPRETATION OF TELEMETRY:    ECG:    RUDDYVASC:     LABS:                        11.4   9.77  )-----------( 294      ( 20 Mar 2025 04:17 )             32.3     03-20    138  |  104  |  18  ----------------------------<  100[H]  3.7   |  23  |  1.12    Ca    9.1      20 Mar 2025 04:17  Phos  1.9     03-20  Mg     1.70     03-20    TPro  6.3  /  Alb  3.6  /  TBili  0.4  /  DBili  x   /  AST  13  /  ALT  14  /  AlkPhos  143[H]  03-19        PT/INR - ( 18 Mar 2025 15:00 )   PT: 11.5 sec;   INR: 0.99 ratio         PTT - ( 18 Mar 2025 15:00 )  PTT:44.8 sec  Urinalysis Basic - ( 20 Mar 2025 04:17 )    Color: x / Appearance: x / SG: x / pH: x  Gluc: 100 mg/dL / Ketone: x  / Bili: x / Urobili: x   Blood: x / Protein: x / Nitrite: x   Leuk Esterase: x / RBC: x / WBC x   Sq Epi: x / Non Sq Epi: x / Bacteria: x      Lipid Panel:   I&O's Summary    19 Mar 2025 07:01  -  20 Mar 2025 05:48  --------------------------------------------------------  IN: 120 mL / OUT: 250 mL / NET: -130 mL        RADIOLOGY & ADDITIONAL STUDIES:

## 2025-03-19 NOTE — CONSULT NOTE ADULT - TIME BILLING
- Review of records, telemetry, vital signs and daily labs.   - General and cardiovascular physical examination.  - Generation of cardiovascular treatment plan and completion of note .  - Coordination of care.      Patient was seen and examined by me on 3/19/25 ,interim events noted,labs and radiology studies reviewed.  Rishi Ventura MD,FACC.  [address] [place], [place] [id number].  [phone]  Availabe to call or text on [employer] TE[employer].

## 2025-03-19 NOTE — H&P ADULT - PROBLEM SELECTOR PLAN 2
- patient found to have new onset CAESAR, likely secondary to dehydration and diuretic use  - trend Cr  - c/w IV fluids  - urine lytes pending  - nephrology consult in AM

## 2025-03-19 NOTE — H&P ADULT - NSHPREVIEWOFSYSTEMS_GEN_ALL_CORE
REVIEW OF SYSTEMS:    CONSTITUTIONAL: No weakness, fevers or chills  EYES/ENT: No visual changes;  No dysphagia; No sore throat; No rhinorrhea; No sinus pain/pressure  NECK: No pain or stiffness  RESPIRATORY: No cough, wheezing, hemoptysis; No shortness of breath  CARDIOVASCULAR: No chest pain or palpitations; No lower extremity edema, + lightheadedness  GASTROINTESTINAL: No abdominal or epigastric pain. No nausea, vomiting, or hematemesis; No diarrhea or constipation. No melena or hematochezia.  GENITOURINARY: No dysuria, frequency or hematuria  NEUROLOGICAL: No numbness or focal weakness  MSK: ambulates without assistance  SKIN: No itching, burning, rashes, or lesions   All other review of systems is negative unless indicated above.

## 2025-03-19 NOTE — H&P ADULT - NSHPADDITIONALINFOADULT_GEN_ALL_CORE
As per current hospital policy, this patient will be followed by the private attending and to take over the case in the morning, and assume complete responsibility by Dr. Ventura as listed on the attending of record as above on the chart.

## 2025-03-19 NOTE — H&P ADULT - TIME BILLING
I have spent a total of greater than 90 minutes time spent to prepare to see the patient, including the Emergency room charts and ED progress notes, obtaining and reviewing history, physical examination, explaining the diagnosis, prognosis and treatment plan with the patient/family/caregiver. I also have spent the time ordering studies and testing, interpreting results, medicine reconciliation, subspecialty consultations as indicated and documentation as above.

## 2025-03-19 NOTE — H&P ADULT - NSHPLABSRESULTS_GEN_ALL_CORE
12.1   16.25 )-----------( 301      ( 18 Mar 2025 15:00 )             33.7       03-18    135  |  99  |  21  ----------------------------<  99  3.5   |  24  |  1.63[H]    Ca    9.5      18 Mar 2025 15:00  Mg     1.70     03-18    TPro  6.6  /  Alb  3.8  /  TBili  0.3  /  DBili  x   /  AST  14  /  ALT  17  /  AlkPhos  145[H]  03-18            PT/INR - ( 18 Mar 2025 15:00 )   PT: 11.5 sec;   INR: 0.99 ratio         PTT - ( 18 Mar 2025 15:00 )  PTT:44.8 sec        Urinalysis Basic - ( 18 Mar 2025 17:40 )    Color: Yellow / Appearance: Clear / S.009 / pH: x  Gluc: x / Ketone: Negative mg/dL  / Bili: Negative / Urobili: 0.2 mg/dL   Blood: x / Protein: Negative mg/dL / Nitrite: Negative   Leuk Esterase: Trace / RBC: 0 /HPF / WBC 4 /HPF   Sq Epi: x / Non Sq Epi: 2 /HPF / Bacteria: Negative /HPF          Urinalysis with Rflx Culture (collected 18 Mar 2025 17:40)        CXR: As per my read - no opacities noted, Will wait for prelim/official read    EKG: As per my read - NSR QTc 459 ms

## 2025-03-19 NOTE — H&P ADULT - PROBLEM SELECTOR PLAN 1
- patient presented for new onset postural dizziness - likely due to medication induced from BP meds and diuretics  - c/w maintenance fluids for now  - orthostatics to be checked  - TTE pending  - will f/u with card consult in AM to see if stress testing still needs to be done since he did not complete it in the office. No chest pain at this time. Troponins neg

## 2025-03-19 NOTE — H&P ADULT - NSHPPHYSICALEXAM_GEN_ALL_CORE
PHYSICAL EXAM:  VITALS: Vital Signs Last 24 Hrs  T(C): 36.7 (19 Mar 2025 00:05), Max: 37.1 (18 Mar 2025 13:39)  T(F): 98 (19 Mar 2025 00:05), Max: 98.8 (18 Mar 2025 13:39)  HR: 61 (19 Mar 2025 00:05) (61 - 77)  BP: 102/66 (19 Mar 2025 00:05) (83/55 - 110/73)  BP(mean): --  RR: 17 (19 Mar 2025 00:05) (15 - 18)  SpO2: 98% (19 Mar 2025 00:05) (98% - 100%)    Parameters below as of 19 Mar 2025 00:05  Patient On (Oxygen Delivery Method): room air      GENERAL: NAD, comfortable at bedside  HEAD:  Atraumatic, Normocephalic  EYES: EOMI, PERRL, conjunctiva and sclera clear  ENT: Moist Mucus Membranes present, no ulcers appreciated  NECK: Supple, No JVD  CHEST/LUNG: Clear to auscultation bilaterally; No wheezes, rales or rhonchi, no accessory muscle use  HEART: Regular rate and rhythm; No murmurs, rubs, or gallops, (+)S1, S2  ABDOMEN: Soft, Nontender, Nondistended; Normal Bowel sounds   EXTREMITIES: No clubbing, cyanosis, or edema  PSYCH: normal mood and affect  NEUROLOGY: AAOx3, non-focal  SKIN: No rashes or lesions

## 2025-03-19 NOTE — H&P ADULT - HISTORY OF PRESENT ILLNESS
This is a 60 y/o M with pmhx of HTN, HLD, presented for presyncope episode. The patient reported that he was at his cardiologist's office today and was getting a stress test. While he was running on the treadmill he felt lightheaded and blacking out. No complete LOC noted. The patient also noted that at home when he stand up and walks up the stairs, would get lightheaded with feeling of passing out. The patient denies chest pain at this time. In the cardiologist's office, he was found to have systolic BP in the 90s. He was send to the ED for evaluation. This is a 60 y/o M with pmhx of HTN, HLD, presented for presyncope episode. The patient reported that he was at his cardiologist's office today and was getting a stress test. While he was running on the treadmill he felt lightheaded and blacking out. No complete LOC noted. The patient also noted that at home when he stand up and walks up the stairs, would get lightheaded with feeling of passing out. The patient denies chest pain at this time. In the cardiologist's office, he was found to have systolic BP in the 90s. He was send to the ED for evaluation.    Of note, as per ED, patient was told to stop taking his BP meds however the patient continued to take them.

## 2025-03-20 LAB
A1C WITH ESTIMATED AVERAGE GLUCOSE RESULT: 6 % — HIGH (ref 4–5.6)
ANION GAP SERPL CALC-SCNC: 11 MMOL/L — SIGNIFICANT CHANGE UP (ref 7–14)
BUN SERPL-MCNC: 18 MG/DL — SIGNIFICANT CHANGE UP (ref 7–23)
CALCIUM SERPL-MCNC: 9.1 MG/DL — SIGNIFICANT CHANGE UP (ref 8.4–10.5)
CHLORIDE SERPL-SCNC: 104 MMOL/L — SIGNIFICANT CHANGE UP (ref 98–107)
CO2 SERPL-SCNC: 23 MMOL/L — SIGNIFICANT CHANGE UP (ref 22–31)
CREAT SERPL-MCNC: 1.12 MG/DL — SIGNIFICANT CHANGE UP (ref 0.5–1.3)
EGFR: 76 ML/MIN/1.73M2 — SIGNIFICANT CHANGE UP
EGFR: 76 ML/MIN/1.73M2 — SIGNIFICANT CHANGE UP
ESTIMATED AVERAGE GLUCOSE: 126 — SIGNIFICANT CHANGE UP
GLUCOSE SERPL-MCNC: 100 MG/DL — HIGH (ref 70–99)
HGB BLD-MCNC: 11.4 G/DL — LOW (ref 13–17)
MAGNESIUM SERPL-MCNC: 1.7 MG/DL — SIGNIFICANT CHANGE UP (ref 1.6–2.6)
MCHC RBC-ENTMCNC: 29.6 PG — SIGNIFICANT CHANGE UP (ref 27–34)
MCHC RBC-ENTMCNC: 35.3 G/DL — SIGNIFICANT CHANGE UP (ref 32–36)
MCV RBC AUTO: 83.9 FL — SIGNIFICANT CHANGE UP (ref 80–100)
NRBC # BLD AUTO: 0 K/UL — SIGNIFICANT CHANGE UP (ref 0–0)
NRBC # FLD: 0 K/UL — SIGNIFICANT CHANGE UP (ref 0–0)
NRBC BLD AUTO-RTO: 0 /100 WBCS — SIGNIFICANT CHANGE UP (ref 0–0)
PHOSPHATE SERPL-MCNC: 1.9 MG/DL — LOW (ref 2.5–4.5)
PLATELET # BLD AUTO: 294 K/UL — SIGNIFICANT CHANGE UP (ref 150–400)
POTASSIUM SERPL-MCNC: 3.7 MMOL/L — SIGNIFICANT CHANGE UP (ref 3.5–5.3)
POTASSIUM SERPL-SCNC: 3.7 MMOL/L — SIGNIFICANT CHANGE UP (ref 3.5–5.3)
RBC # BLD: 3.85 M/UL — LOW (ref 4.2–5.8)
RBC # FLD: 13.8 % — SIGNIFICANT CHANGE UP (ref 10.3–14.5)
SODIUM SERPL-SCNC: 138 MMOL/L — SIGNIFICANT CHANGE UP (ref 135–145)
WBC # BLD: 9.77 K/UL — SIGNIFICANT CHANGE UP (ref 3.8–10.5)
WBC # FLD AUTO: 9.77 K/UL — SIGNIFICANT CHANGE UP (ref 3.8–10.5)

## 2025-03-20 PROCEDURE — 92928 PRQ TCAT PLMT NTRAC ST 1 LES: CPT | Mod: LC

## 2025-03-20 PROCEDURE — 93010 ELECTROCARDIOGRAM REPORT: CPT

## 2025-03-20 PROCEDURE — 93454 CORONARY ARTERY ANGIO S&I: CPT | Mod: 26,59

## 2025-03-20 RX ADMIN — CLOPIDOGREL BISULFATE 75 MILLIGRAM(S): 75 TABLET, FILM COATED ORAL at 10:19

## 2025-03-20 RX ADMIN — ATORVASTATIN CALCIUM 40 MILLIGRAM(S): 80 TABLET, FILM COATED ORAL at 21:30

## 2025-03-20 RX ADMIN — Medication 40 MILLIEQUIVALENT(S): at 01:34

## 2025-03-20 RX ADMIN — Medication 100 MILLILITER(S): at 13:45

## 2025-03-20 RX ADMIN — Medication 1000 MILLILITER(S): at 10:47

## 2025-03-20 RX ADMIN — Medication 81 MILLIGRAM(S): at 10:19

## 2025-03-20 RX ADMIN — HEPARIN SODIUM 5000 UNIT(S): 1000 INJECTION INTRAVENOUS; SUBCUTANEOUS at 18:01

## 2025-03-20 RX ADMIN — Medication 75 MILLILITER(S): at 10:47

## 2025-03-20 NOTE — PROGRESS NOTE ADULT - SUBJECTIVE AND OBJECTIVE BOX
MR#[id number]  PATIENT NAME:JERMAIN LYN    DATE OF SERVICE: 03-20-25 @ 21:20  Patient was seen and examined by Rishi Ventura MD on    03-20-25 @ 21:20 .  Interim events noted.Consultant notes ,Labs,Telemetry reviewed by me       HOSPITAL COURSE: HPI:  This is a 60 y/o M with pmhx of HTN, HLD, presented for presyncope episode. The patient reported that he was at his cardiologist's office today and was getting a stress test. While he was running on the treadmill he felt lightheaded and blacking out. No complete LOC noted. The patient also noted that at home when he stand up and walks up the stairs, would get lightheaded with feeling of passing out. The patient denies chest pain at this time. In the cardiologist's office, he was found to have systolic BP in the 90s. He was send to the ED for evaluation.    Of note, as per ED, patient was told to stop taking his BP meds however the patient continued to take them.  (19 Mar 2025 00:23)      INTERIM EVENTS:Patient seen at bedside ,interim events noted.      PMH -reviewed admission note, no change since admission  HEART FAILURE: Acute[ ]Chronic[ ] Systolic[ ] Diastolic[ ] Combined Systolic and Diastolic[ ]  CAD[ ] CABG[ ] PCI[ ]  DEVICES[ ] PPM[ ] ICD[ ] ILR[ ]  ATRIAL FIBRILLATION[ ] Paroxysmal[ ] Permanent[ ] CHADS2-[  ]  CAESAR[ ] CKD1[ ] CKD2[ ] CKD3[ ] CKD4[ ] ESRD[ ]  COPD[ ] HTN[ ]   DM[ ] Type1[ ] Type 2[ ]   CVA[ ] Paresis[ ]    AMBULATION: Assisted[ ] Cane/walker[ ] Independent[ ]    MEDICATIONS  (STANDING):  aspirin enteric coated 81 milliGRAM(s) Oral daily  atorvastatin 40 milliGRAM(s) Oral at bedtime  clopidogrel Tablet 75 milliGRAM(s) Oral daily  heparin   Injectable 5000 Unit(s) SubCutaneous every 12 hours  influenza   Vaccine 0.5 milliLiter(s) IntraMuscular once  sodium chloride 0.9%. 500 milliLiter(s) (75 mL/Hr) IV Continuous <Continuous>  sodium chloride 0.9%. 500 milliLiter(s) (100 mL/Hr) IV Continuous <Continuous>    MEDICATIONS  (PRN):  acetaminophen     Tablet .. 650 milliGRAM(s) Oral every 6 hours PRN Temp greater or equal to 38C (100.4F), Mild Pain (1 - 3)  aluminum hydroxide/magnesium hydroxide/simethicone Suspension 30 milliLiter(s) Oral every 4 hours PRN Dyspepsia  melatonin 3 milliGRAM(s) Oral at bedtime PRN Insomnia  ondansetron Injectable 4 milliGRAM(s) IV Push every 8 hours PRN Nausea and/or Vomiting            REVIEW OF SYSTEMS:  Constitutional: [ ] fever, [ ]weight loss,  [ ]fatigue [ ]weight gain  Eyes: [ ] visual changes  Respiratory: [ ]shortness of breath;  [ ] cough, [ ]wheezing, [ ]chills, [ ]hemoptysis  Cardiovascular: [ ] chest pain, [ ]palpitations, [ ]dizziness,  [ ]leg swelling[ ]orthopnea[ ]PND  Gastrointestinal: [ ] abdominal pain, [ ]nausea, [ ]vomiting,  [ ]diarrhea [ ]Constipation [ ]Melena  Genitourinary: [ ] dysuria, [ ] hematuria [ ]Saldana  Neurologic: [ ] headaches [ ] tremors[ ]weakness [ ]Paralysis Right[ ] Left[ ]  Skin: [ ] itching, [ ]burning, [ ] rashes  Endocrine: [ ] heat or cold intolerance  Musculoskeletal: [ ] joint pain or swelling; [ ] muscle, back, or extremity pain  Psychiatric: [ ] depression, [ ]anxiety, [ ]mood swings, or [ ]difficulty sleeping  Hematologic: [ ] easy bruising, [ ] bleeding gums    [ ] All remaining systems negative except as per above.   [ ]Unable to obtain.  [x] No change in ROS since admission      Vital Signs Last 24 Hrs  T(C): 37 (20 Mar 2025 21:00), Max: 37.1 (20 Mar 2025 08:58)  T(F): 98.6 (20 Mar 2025 21:00), Max: 98.7 (20 Mar 2025 08:58)  HR: 66 (20 Mar 2025 17:01) (56 - 68)  BP: 122/100 (20 Mar 2025 21:00) (116/87 - 148/94)  BP(mean): --  RR: 18 (20 Mar 2025 21:00) (12 - 20)  SpO2: 97% (20 Mar 2025 21:00) (97% - 100%)    Parameters below as of 20 Mar 2025 21:00  Patient On (Oxygen Delivery Method): room air      I&O's Summary    19 Mar 2025 07:01  -  20 Mar 2025 07:00  --------------------------------------------------------  IN: 120 mL / OUT: 250 mL / NET: -130 mL    20 Mar 2025 07:01  -  20 Mar 2025 21:20  --------------------------------------------------------  IN: 480 mL / OUT: 0 mL / NET: 480 mL        PHYSICAL EXAM:  General: No acute distress BMI-  HEENT: EOMI, PERRL  Neck: Supple, [ ] JVD  Lungs: Equal air entry bilaterally; [ ] rales [ ] wheezing [ ] rhonchi  Heart: Regular rate and rhythm; [x ] murmur   2/6 [ x] systolic [ ] diastolic [ ] radiation[ ] rubs [ ]  gallops  Abdomen: Nontender, bowel sounds present  Extremities: No clubbing, cyanosis, [ ] edema [ ]Pulses  equal and intact  Nervous system:  Alert & Oriented X3, no focal deficits  Psychiatric: Normal affect  Skin: No rashes or lesions    LABS:  03-20    138  |  104  |  18  ----------------------------<  100[H]  3.7   |  23  |  1.12    Ca    9.1      20 Mar 2025 04:17  Phos  1.9     03-20  Mg     1.70     03-20    TPro  6.3  /  Alb  3.6  /  TBili  0.4  /  DBili  x   /  AST  13  /  ALT  14  /  AlkPhos  143[H]  03-19    Creatinine Trend: 1.12<--, 1.25<--, 1.63<--                        11.4   9.77  )-----------( 294      ( 20 Mar 2025 04:17 )             32.3       < from: TTE W or WO Ultrasound Enhancing Agent (03.19.25 @ 14:35) >     CONCLUSIONS:      1. Left ventricular cavity is normal in size. Left ventricular wall thickness is normal. Left ventricular systolic function is normal with an ejection fraction of 71 % by Marshall's method of disks. There are no regional wall motion abnormalities seen.   2. There is mild (grade 1) left ventricular diastolic dysfunction.   3. Normal right ventricular cavity size and normal right ventricular systolic function. Tricuspid annular plane systolic excursion (TAPSE) is 2.6 cm (normal >=1.7 cm).   4. Structurally normal mitral valve with normal leaflet excursion. There is trace mitral regurgitation.    < end of copied text >

## 2025-03-20 NOTE — PROGRESS NOTE ADULT - PROBLEM SELECTOR PLAN 1
- Monitor BP, holding home meds, likely medication induced 2/2 BP meds and diuretics  - Orthostatics neg 3/19 s/p IVF  - 3/19 TTE - EF 71%, no wma  - 3/20 LHC - sunni frontier stent mLCX 70; RRA access

## 2025-03-20 NOTE — PROGRESS NOTE ADULT - TIME BILLING
- Review of records, telemetry, vital signs and daily labs.   - General and cardiovascular physical examination.  - Generation of cardiovascular treatment plan and completion of note .  - Coordination of care.      Patient was seen and examined by me on 3/20/25 ,interim events noted,labs and radiology studies reviewed.  Rishi Ventura MD,FACC.  [address] [place], [place] [id number].  [phone]  Availabe to call or text on [employer] TE[employer].

## 2025-03-20 NOTE — PRE PROCEDURE NOTE - PRE PROCEDURE EVALUATION
Pre Procedural Sedation Evaluation    History and physical reviewed  Medications reviewed  Labs reviewed  EKG reviewed    Anticoagulation: Heparin subc 3/19 at 1800  Urine pregnancy: N/A  Dentures: None  Last PO intake: 3/19 at 2200    Pre-Procedure Physical Assessment  Mental status: Alert and oriented x 3  Level of consciousness: 1  Cardiac assessment: Stable  Pulmonary assessment: Stable  Obstructive sleep apnea: No  Aspiration risk: No  Mallampati score: 2  ASA Classification: 2  Prior Sedative or Anesthesia Experience: No complications  Informed consent by responsible adult: Yes  Based on today's assessment, anesthesia consult requested: No Pre Procedural Sedation Evaluation    History and physical reviewed  Medications reviewed  Labs reviewed  EKG reviewed    Anticoagulation: Heparin subc 3/19 at 1800  Dentures: uppers and lowers  Last PO intake: 3/19 at 2200    Pre-Procedure Physical Assessment  Mental status: Alert and oriented x 3  Level of consciousness: 1  Cardiac assessment: Stable  Pulmonary assessment: Stable  Obstructive sleep apnea: No  Aspiration risk: No  Mallampati score: 2  ASA Classification: 2  Prior Sedative or Anesthesia Experience: No complications  Informed consent by responsible adult: Yes  Based on today's assessment, anesthesia consult requested: No

## 2025-03-20 NOTE — CHART NOTE - NSCHARTNOTEFT_GEN_A_CORE
Medicine PA Note    Pt returned to the floor from the Cath Lab. RRA site is clean and intact. No signs of bleeding or a hematoma. Pulses present. Pt denies any pain at this time.     Salvador Posada PA-C  x [phone]

## 2025-03-21 ENCOUNTER — TRANSCRIPTION ENCOUNTER (OUTPATIENT)
Age: 60
End: 2025-03-21

## 2025-03-21 VITALS
TEMPERATURE: 98 F | OXYGEN SATURATION: 100 % | RESPIRATION RATE: 18 BRPM | DIASTOLIC BLOOD PRESSURE: 99 MMHG | SYSTOLIC BLOOD PRESSURE: 138 MMHG | HEART RATE: 75 BPM

## 2025-03-21 LAB
ANION GAP SERPL CALC-SCNC: 11 MMOL/L — SIGNIFICANT CHANGE UP (ref 7–14)
BASOPHILS # BLD AUTO: 0.06 K/UL — SIGNIFICANT CHANGE UP (ref 0–0.2)
BASOPHILS NFR BLD AUTO: 0.5 % — SIGNIFICANT CHANGE UP (ref 0–2)
BUN SERPL-MCNC: 15 MG/DL — SIGNIFICANT CHANGE UP (ref 7–23)
CALCIUM SERPL-MCNC: 9.2 MG/DL — SIGNIFICANT CHANGE UP (ref 8.4–10.5)
CHLORIDE SERPL-SCNC: 105 MMOL/L — SIGNIFICANT CHANGE UP (ref 98–107)
CO2 SERPL-SCNC: 23 MMOL/L — SIGNIFICANT CHANGE UP (ref 22–31)
CREAT SERPL-MCNC: 1.22 MG/DL — SIGNIFICANT CHANGE UP (ref 0.5–1.3)
EGFR: 69 ML/MIN/1.73M2 — SIGNIFICANT CHANGE UP
EGFR: 69 ML/MIN/1.73M2 — SIGNIFICANT CHANGE UP
EOSINOPHIL # BLD AUTO: 0.34 K/UL — SIGNIFICANT CHANGE UP (ref 0–0.5)
EOSINOPHIL NFR BLD AUTO: 3 % — SIGNIFICANT CHANGE UP (ref 0–6)
GLUCOSE SERPL-MCNC: 78 MG/DL — SIGNIFICANT CHANGE UP (ref 70–99)
HCT VFR BLD CALC: 33.9 % — LOW (ref 39–50)
HGB BLD-MCNC: 11.9 G/DL — LOW (ref 13–17)
IANC: 7.45 K/UL — HIGH (ref 1.8–7.4)
IMM GRANULOCYTES NFR BLD AUTO: 0.4 % — SIGNIFICANT CHANGE UP (ref 0–0.9)
LYMPHOCYTES # BLD AUTO: 2.62 K/UL — SIGNIFICANT CHANGE UP (ref 1–3.3)
LYMPHOCYTES # BLD AUTO: 23 % — SIGNIFICANT CHANGE UP (ref 13–44)
MAGNESIUM SERPL-MCNC: 1.8 MG/DL — SIGNIFICANT CHANGE UP (ref 1.6–2.6)
MCHC RBC-ENTMCNC: 29 PG — SIGNIFICANT CHANGE UP (ref 27–34)
MCHC RBC-ENTMCNC: 35.1 G/DL — SIGNIFICANT CHANGE UP (ref 32–36)
MCV RBC AUTO: 82.7 FL — SIGNIFICANT CHANGE UP (ref 80–100)
MONOCYTES # BLD AUTO: 0.87 K/UL — SIGNIFICANT CHANGE UP (ref 0–0.9)
MONOCYTES NFR BLD AUTO: 7.6 % — SIGNIFICANT CHANGE UP (ref 2–14)
NEUTROPHILS # BLD AUTO: 7.45 K/UL — HIGH (ref 1.8–7.4)
NEUTROPHILS NFR BLD AUTO: 65.5 % — SIGNIFICANT CHANGE UP (ref 43–77)
NRBC # BLD AUTO: 0 K/UL — SIGNIFICANT CHANGE UP (ref 0–0)
NRBC BLD AUTO-RTO: 0 /100 WBCS — SIGNIFICANT CHANGE UP (ref 0–0)
PHOSPHATE SERPL-MCNC: 2.1 MG/DL — LOW (ref 2.5–4.5)
PLATELET # BLD AUTO: 312 K/UL — SIGNIFICANT CHANGE UP (ref 150–400)
POTASSIUM SERPL-MCNC: 3.5 MMOL/L — SIGNIFICANT CHANGE UP (ref 3.5–5.3)
POTASSIUM SERPL-SCNC: 3.5 MMOL/L — SIGNIFICANT CHANGE UP (ref 3.5–5.3)
RBC # FLD: 13.6 % — SIGNIFICANT CHANGE UP (ref 10.3–14.5)
SODIUM SERPL-SCNC: 139 MMOL/L — SIGNIFICANT CHANGE UP (ref 135–145)
WBC # BLD: 11.38 K/UL — HIGH (ref 3.8–10.5)
WBC # FLD AUTO: 11.38 K/UL — HIGH (ref 3.8–10.5)

## 2025-03-21 RX ORDER — SOD PHOS DI, MONO/K PHOS MONO 250 MG
1 TABLET ORAL THREE TIMES A DAY
Refills: 0 | Status: DISCONTINUED | OUTPATIENT
Start: 2025-03-21 | End: 2025-03-21

## 2025-03-21 RX ORDER — CLOPIDOGREL BISULFATE 75 MG/1
0 TABLET, FILM COATED ORAL
Qty: 0 | Refills: 3 | DISCHARGE

## 2025-03-21 RX ORDER — SILDENAFIL 50 MG/1
1 TABLET, FILM COATED ORAL
Refills: 0 | DISCHARGE

## 2025-03-21 RX ORDER — CLOPIDOGREL BISULFATE 75 MG/1
1 TABLET, FILM COATED ORAL
Qty: 90 | Refills: 0
Start: 2025-03-21 | End: 2025-06-18

## 2025-03-21 RX ORDER — CHLORTHALIDONE 25 MG/1
0 TABLET ORAL
Qty: 0 | Refills: 6 | DISCHARGE

## 2025-03-21 RX ORDER — CLOPIDOGREL BISULFATE 75 MG/1
1 TABLET, FILM COATED ORAL
Qty: 90 | Refills: 0 | DISCHARGE
Start: 2025-03-21 | End: 2025-06-18

## 2025-03-21 RX ADMIN — Medication 81 MILLIGRAM(S): at 11:34

## 2025-03-21 RX ADMIN — Medication 1 PACKET(S): at 09:01

## 2025-03-21 RX ADMIN — CLOPIDOGREL BISULFATE 75 MILLIGRAM(S): 75 TABLET, FILM COATED ORAL at 11:34

## 2025-03-21 NOTE — DISCHARGE NOTE PROVIDER - REASON FOR ADMISSION
· Pt has alcoholic cirrhosis of the liver, likely 2/2 chronic alcohol use  · During last detox admission, RUQ U/S 1/13/22 revealed - cirrhosis, trace ascites, no focal liver mass  · Pt was discharged from the detox unit on lactulose 20 g BID,   · Pt reports he has not had OP GI f/u and has not been taking his lactulose x3 weeks  · +Abdominal varices with RUQ and epigastric TTP on exam  · Pt reports these varices are chronic but have been growing larger and are now causing 6/10 aching abdominal pain that has been constant for a few months  · Exam also significant for scleral icterus  · Significant labs:  · CMP 4/18/22: , ALT 51, , Total bili 4 39, Direct bili 2 43  · PT 17 0, INR 1 42  · Platelets 28  · CT A/P 4/19/22: "Cirrhosis with signs of portal hypertension as evidenced by recanalized umbilical vein "  · Restart lactulose  · Encourage alcohol cessation  · Continue monitoring CMP  · Check ammonia in the AM  · Recommend OP GI follow-up upon discharge syncope

## 2025-03-21 NOTE — DISCHARGE NOTE NURSING/CASE MANAGEMENT/SOCIAL WORK - FINANCIAL ASSISTANCE
[facility] provides services at a reduced cost to those who are determined to be eligible through [facility]’s financial assistance program. Information regarding [facility]’s financial assistance program can be found by going to [email or web address] or by calling [phone].

## 2025-03-21 NOTE — DISCHARGE NOTE PROVIDER - PROVIDER TOKENS
PROVIDER:[TOKEN:[[id number]],ESTABLISHEDPATIENT:[T]] PROVIDER:[TOKEN:[[id number]],ESTABLISHEDPATIENT:[T]],PROVIDER:[TOKEN:[[id number]],ESTABLISHEDPATIENT:[T]]

## 2025-03-21 NOTE — DISCHARGE NOTE PROVIDER - NSDCMRMEDTOKEN_GEN_ALL_CORE_FT
ASPIRIN EC 81 MG TABLET: TAKE 1 TABLET BY MOUTH EVERY DAY  ATORVASTATIN 40 MG TABLET: 1 tab(s) orally once a day (last filled 10/2024 for 90 days)  CARVEDILOL 12.5 MG TABLET: TAKE 1 TABLET (12.5 MG) BY MOUTH 2 TIMES PER DAY WITH FOOD  CILOSTAZOL 100 MG TABLET: 1 tab(s) orally 2 times a day  LOSARTAN POTASSIUM 50 MG TAB: TAKE 1 TABLET BY MOUTH EVERY DAY  Plavix 75 mg oral tablet: 1 tab(s) orally once a day

## 2025-03-21 NOTE — DISCHARGE NOTE NURSING/CASE MANAGEMENT/SOCIAL WORK - PATIENT PORTAL LINK FT
You can access the FollowMyHealth Patient Portal offered by [facility] by registering at the following website: [email or web address]. By joining [email or web address] [email or web address] portal, you will also be able to view your health information using other applications (apps) compatible with our system.

## 2025-03-21 NOTE — DISCHARGE NOTE PROVIDER - HOSPITAL COURSE
58 y/o male, with a PmHx of HTN, HLD, PAD, Colon Cancer (s/p resection), p/w near syncopal episode at cardiologist's office.    + Postural Dizziness w/Pre-Syncope - 3/20 s/p LHC w/stent placed to the mLCX  + CAESAR - improved with fluids, likely 2/2 dehydration      Postural dizziness with presyncope  - Monitor BP, holding home meds, likely medication induced 2/2 BP meds and diuretics  - Orthostatics neg 3/19 s/p IVF  - 3/19 TTE - EF 71%, no wma  - 3/20 LHC - sunni frontier stent mLCX 70; RRA access: no hematoma, pain, swelling bleeding, pain discharge Pulses 2+ bilaterally    CAESAR  - likely 2/2 dehydration and diuretic use  - improved s/p IVF    Benign essential HTN.   - hold BP meds for now.  - moniotor bp    CAD  - c/w DAPT  3/20 CATH       Case discussed with Dr Ventura. Patient stable for discharge home. Fu cardiologist as outpatient. As per Dr Ventura-dc chlorthalidone but restart coreg and losartan

## 2025-03-21 NOTE — DISCHARGE NOTE PROVIDER - CARE PROVIDERS DIRECT ADDRESSES
[email or web address]

## 2025-03-21 NOTE — DISCHARGE NOTE PROVIDER - CARE PROVIDER_API CALL
Augustine Murdock  Interventional Cardiology  [address] [place] [address], [address]  [place] [address]  Phone: [phone]  Fax: [phone]  Established Patient  Follow Up Time:    Augustine Murdock  Interventional Cardiology  [address] [place] [address], [address]  [place] [address]  Phone: [phone]  Fax: [phone]  Established Patient  Follow Up Time:     Milo Loza  Cardiovascular Disease  [address] [place] [address]  Phone: [phone]  Fax: [phone]  Established Patient  Follow Up Time:

## 2025-03-21 NOTE — DISCHARGE NOTE PROVIDER - NSDCCPCAREPLAN_GEN_ALL_CORE_FT
PRINCIPAL DISCHARGE DIAGNOSIS  Diagnosis: Hypotension  Assessment and Plan of Treatment: Stop chlorthalidone for now  Follow up with your cardiologist in one week      SECONDARY DISCHARGE DIAGNOSES  Diagnosis: CAD (coronary artery disease)  Assessment and Plan of Treatment: You had a coronary artery stent placed.  ASPIRIN EC 81 MG TABLET: TAKE 1 TABLET BY MOUTH EVERY DAY  ATORVASTATIN 40 MG TABLET: 1 tab(s) orally once a day   CARVEDILOL 12.5 MG TABLET: TAKE 1 TABLET (12.5 MG) BY MOUTH 2 TIMES PER DAY WITH FOOD  LOSARTAN POTASSIUM 50 MG TAB: TAKE 1 TABLET BY MOUTH EVERY DAY  Plavix 75 mg oral tablet: 1 tab(s) orally once a day      Diagnosis: Benign essential HTN  Assessment and Plan of Treatment: CARVEDILOL 12.5 MG TABLET: TAKE 1 TABLET (12.5 MG) BY MOUTH 2 TIMES PER DAY WITH FOOD  LOSARTAN POTASSIUM 50 MG TAB: TAKE 1 TABLET BY MOUTH EVERY DAY

## 2025-03-24 LAB
CULTURE RESULTS: SIGNIFICANT CHANGE UP
CULTURE RESULTS: SIGNIFICANT CHANGE UP
SPECIMEN SOURCE: SIGNIFICANT CHANGE UP
SPECIMEN SOURCE: SIGNIFICANT CHANGE UP

## 2025-03-28 ENCOUNTER — NON-APPOINTMENT (OUTPATIENT)
Age: 60
End: 2025-03-28

## 2025-05-09 ENCOUNTER — RX RENEWAL (OUTPATIENT)
Age: 60
End: 2025-05-09

## 2025-05-28 ENCOUNTER — APPOINTMENT (OUTPATIENT)
Dept: UROLOGY | Facility: CLINIC | Age: 60
End: 2025-05-28

## 2025-08-27 ENCOUNTER — INPATIENT (INPATIENT)
Facility: HOSPITAL | Age: 60
LOS: 5 days | Discharge: HOME | End: 2025-09-02
Attending: HOSPITALIST | Admitting: HOSPITALIST
Payer: MEDICARE

## 2025-08-27 VITALS
SYSTOLIC BLOOD PRESSURE: 174 MMHG | HEIGHT: 66 IN | TEMPERATURE: 99 F | WEIGHT: 147.05 LBS | OXYGEN SATURATION: 99 % | RESPIRATION RATE: 17 BRPM | DIASTOLIC BLOOD PRESSURE: 121 MMHG | HEART RATE: 104 BPM

## 2025-08-27 DIAGNOSIS — Z90.49 ACQUIRED ABSENCE OF OTHER SPECIFIED PARTS OF DIGESTIVE TRACT: Chronic | ICD-10-CM

## 2025-08-27 DIAGNOSIS — M27.2 INFLAMMATORY CONDITIONS OF JAWS: ICD-10-CM

## 2025-08-27 DIAGNOSIS — Z29.9 ENCOUNTER FOR PROPHYLACTIC MEASURES, UNSPECIFIED: ICD-10-CM

## 2025-08-27 DIAGNOSIS — M86.10 OTHER ACUTE OSTEOMYELITIS, UNSPECIFIED SITE: ICD-10-CM

## 2025-08-27 DIAGNOSIS — I73.9 PERIPHERAL VASCULAR DISEASE, UNSPECIFIED: ICD-10-CM

## 2025-08-27 DIAGNOSIS — A41.9 SEPSIS, UNSPECIFIED ORGANISM: ICD-10-CM

## 2025-08-27 DIAGNOSIS — K12.2 CELLULITIS AND ABSCESS OF MOUTH: ICD-10-CM

## 2025-08-27 DIAGNOSIS — Z98.890 OTHER SPECIFIED POSTPROCEDURAL STATES: ICD-10-CM

## 2025-08-27 DIAGNOSIS — E78.5 HYPERLIPIDEMIA, UNSPECIFIED: ICD-10-CM

## 2025-08-27 DIAGNOSIS — I16.0 HYPERTENSIVE URGENCY: ICD-10-CM

## 2025-08-27 DIAGNOSIS — I25.10 ATHEROSCLEROTIC HEART DISEASE OF NATIVE CORONARY ARTERY WITHOUT ANGINA PECTORIS: ICD-10-CM

## 2025-08-27 LAB
A1C WITH ESTIMATED AVERAGE GLUCOSE RESULT: 5.4 % — SIGNIFICANT CHANGE UP (ref 4–5.6)
ALBUMIN SERPL ELPH-MCNC: 4.1 G/DL — SIGNIFICANT CHANGE UP (ref 3.3–5)
ALP SERPL-CCNC: 168 U/L — HIGH (ref 40–120)
ALT FLD-CCNC: 20 U/L — SIGNIFICANT CHANGE UP (ref 4–41)
ANION GAP SERPL CALC-SCNC: 15 MMOL/L — HIGH (ref 7–14)
AST SERPL-CCNC: 23 U/L — SIGNIFICANT CHANGE UP (ref 4–40)
BASOPHILS # BLD AUTO: 0.05 K/UL — SIGNIFICANT CHANGE UP (ref 0–0.2)
BASOPHILS NFR BLD AUTO: 0.3 % — SIGNIFICANT CHANGE UP (ref 0–2)
BILIRUB SERPL-MCNC: 0.4 MG/DL — SIGNIFICANT CHANGE UP (ref 0.2–1.2)
BUN SERPL-MCNC: 17 MG/DL — SIGNIFICANT CHANGE UP (ref 7–23)
CALCIUM SERPL-MCNC: 9 MG/DL — SIGNIFICANT CHANGE UP (ref 8.4–10.5)
CHLORIDE SERPL-SCNC: 105 MMOL/L — SIGNIFICANT CHANGE UP (ref 98–107)
CO2 SERPL-SCNC: 16 MMOL/L — LOW (ref 22–31)
CREAT SERPL-MCNC: 1.22 MG/DL — SIGNIFICANT CHANGE UP (ref 0.5–1.3)
EGFR: 69 ML/MIN/1.73M2 — SIGNIFICANT CHANGE UP
EGFR: 69 ML/MIN/1.73M2 — SIGNIFICANT CHANGE UP
EOSINOPHIL # BLD AUTO: 0.09 K/UL — SIGNIFICANT CHANGE UP (ref 0–0.5)
EOSINOPHIL NFR BLD AUTO: 0.6 % — SIGNIFICANT CHANGE UP (ref 0–6)
ESTIMATED AVERAGE GLUCOSE: 108 — SIGNIFICANT CHANGE UP
GLUCOSE SERPL-MCNC: 99 MG/DL — SIGNIFICANT CHANGE UP (ref 70–99)
HCT VFR BLD CALC: 37.6 % — LOW (ref 39–50)
HGB BLD-MCNC: 12.9 G/DL — LOW (ref 13–17)
IMM GRANULOCYTES # BLD AUTO: 0.08 K/UL — HIGH (ref 0–0.07)
IMM GRANULOCYTES NFR BLD AUTO: 0.5 % — SIGNIFICANT CHANGE UP (ref 0–0.9)
LACTATE SERPL-SCNC: 1.4 MMOL/L — SIGNIFICANT CHANGE UP (ref 0.5–2)
LYMPHOCYTES # BLD AUTO: 1.77 K/UL — SIGNIFICANT CHANGE UP (ref 1–3.3)
LYMPHOCYTES NFR BLD AUTO: 12 % — LOW (ref 13–44)
MCHC RBC-ENTMCNC: 29.3 PG — SIGNIFICANT CHANGE UP (ref 27–34)
MCHC RBC-ENTMCNC: 34.3 G/DL — SIGNIFICANT CHANGE UP (ref 32–36)
MCV RBC AUTO: 85.5 FL — SIGNIFICANT CHANGE UP (ref 80–100)
MONOCYTES # BLD AUTO: 1.75 K/UL — HIGH (ref 0–0.9)
MONOCYTES NFR BLD AUTO: 11.9 % — SIGNIFICANT CHANGE UP (ref 2–14)
NEUTROPHILS # BLD AUTO: 11.02 K/UL — HIGH (ref 1.8–7.4)
NEUTROPHILS NFR BLD AUTO: 74.7 % — SIGNIFICANT CHANGE UP (ref 43–77)
NRBC # BLD AUTO: 0 K/UL — SIGNIFICANT CHANGE UP (ref 0–0)
NRBC # FLD: 0 K/UL — SIGNIFICANT CHANGE UP (ref 0–0)
NRBC BLD AUTO-RTO: 0 /100 WBCS — SIGNIFICANT CHANGE UP (ref 0–0)
PLATELET # BLD AUTO: 316 K/UL — SIGNIFICANT CHANGE UP (ref 150–400)
PMV BLD: 11.1 FL — SIGNIFICANT CHANGE UP (ref 7–13)
POTASSIUM SERPL-MCNC: 3.9 MMOL/L — SIGNIFICANT CHANGE UP (ref 3.5–5.3)
POTASSIUM SERPL-SCNC: 3.9 MMOL/L — SIGNIFICANT CHANGE UP (ref 3.5–5.3)
PROT SERPL-MCNC: 7.4 G/DL — SIGNIFICANT CHANGE UP (ref 6–8.3)
RBC # BLD: 4.4 M/UL — SIGNIFICANT CHANGE UP (ref 4.2–5.8)
RBC # FLD: 17.1 % — HIGH (ref 10.3–14.5)
SODIUM SERPL-SCNC: 136 MMOL/L — SIGNIFICANT CHANGE UP (ref 135–145)
WBC # BLD: 14.76 K/UL — HIGH (ref 3.8–10.5)
WBC # FLD AUTO: 14.76 K/UL — HIGH (ref 3.8–10.5)

## 2025-08-27 PROCEDURE — 99223 1ST HOSP IP/OBS HIGH 75: CPT

## 2025-08-27 PROCEDURE — 99285 EMERGENCY DEPT VISIT HI MDM: CPT

## 2025-08-27 PROCEDURE — 70491 CT SOFT TISSUE NECK W/DYE: CPT | Mod: 26

## 2025-08-27 RX ORDER — AMPICILLIN SODIUM AND SULBACTAM SODIUM 1; .5 G/1; G/1
3 INJECTION, POWDER, FOR SOLUTION INTRAMUSCULAR; INTRAVENOUS EVERY 6 HOURS
Refills: 0 | Status: DISCONTINUED | OUTPATIENT
Start: 2025-08-27 | End: 2025-08-31

## 2025-08-27 RX ORDER — POLYETHYLENE GLYCOL 3350 17 G/17G
17 POWDER, FOR SOLUTION ORAL DAILY
Refills: 0 | Status: DISCONTINUED | OUTPATIENT
Start: 2025-08-27 | End: 2025-09-02

## 2025-08-27 RX ORDER — CLOPIDOGREL BISULFATE 75 MG/1
75 TABLET, FILM COATED ORAL DAILY
Refills: 0 | Status: DISCONTINUED | OUTPATIENT
Start: 2025-08-27 | End: 2025-09-02

## 2025-08-27 RX ORDER — AMPICILLIN SODIUM AND SULBACTAM SODIUM 1; .5 G/1; G/1
3 INJECTION, POWDER, FOR SOLUTION INTRAMUSCULAR; INTRAVENOUS ONCE
Refills: 0 | Status: COMPLETED | OUTPATIENT
Start: 2025-08-27 | End: 2025-08-27

## 2025-08-27 RX ORDER — ONDANSETRON HCL/PF 4 MG/2 ML
4 VIAL (ML) INJECTION EVERY 8 HOURS
Refills: 0 | Status: DISCONTINUED | OUTPATIENT
Start: 2025-08-27 | End: 2025-09-02

## 2025-08-27 RX ORDER — CILOSTAZOL 50 MG/1
100 TABLET ORAL
Refills: 0 | Status: DISCONTINUED | OUTPATIENT
Start: 2025-08-27 | End: 2025-09-02

## 2025-08-27 RX ORDER — NICOTINE POLACRILEX 4 MG/1
1 GUM, CHEWING ORAL DAILY
Refills: 0 | Status: DISCONTINUED | OUTPATIENT
Start: 2025-08-27 | End: 2025-09-02

## 2025-08-27 RX ORDER — CARVEDILOL 3.12 MG/1
12.5 TABLET, FILM COATED ORAL EVERY 12 HOURS
Refills: 0 | Status: DISCONTINUED | OUTPATIENT
Start: 2025-08-27 | End: 2025-09-02

## 2025-08-27 RX ORDER — SENNA 187 MG
2 TABLET ORAL AT BEDTIME
Refills: 0 | Status: DISCONTINUED | OUTPATIENT
Start: 2025-08-27 | End: 2025-09-02

## 2025-08-27 RX ORDER — LOSARTAN POTASSIUM 100 MG/1
50 TABLET, FILM COATED ORAL ONCE
Refills: 0 | Status: COMPLETED | OUTPATIENT
Start: 2025-08-27 | End: 2025-08-27

## 2025-08-27 RX ORDER — BISACODYL 5 MG
5 TABLET, DELAYED RELEASE (ENTERIC COATED) ORAL DAILY
Refills: 0 | Status: DISCONTINUED | OUTPATIENT
Start: 2025-08-27 | End: 2025-09-02

## 2025-08-27 RX ORDER — MELATONIN 5 MG
3 TABLET ORAL AT BEDTIME
Refills: 0 | Status: DISCONTINUED | OUTPATIENT
Start: 2025-08-27 | End: 2025-09-02

## 2025-08-27 RX ORDER — ASPIRIN 325 MG
81 TABLET ORAL DAILY
Refills: 0 | Status: DISCONTINUED | OUTPATIENT
Start: 2025-08-27 | End: 2025-09-02

## 2025-08-27 RX ORDER — LOSARTAN POTASSIUM 100 MG/1
50 TABLET, FILM COATED ORAL DAILY
Refills: 0 | Status: DISCONTINUED | OUTPATIENT
Start: 2025-08-27 | End: 2025-08-28

## 2025-08-27 RX ORDER — ACETAMINOPHEN 500 MG/5ML
650 LIQUID (ML) ORAL EVERY 6 HOURS
Refills: 0 | Status: DISCONTINUED | OUTPATIENT
Start: 2025-08-27 | End: 2025-09-02

## 2025-08-27 RX ORDER — VANCOMYCIN HCL IN 5 % DEXTROSE 1.5G/250ML
1000 PLASTIC BAG, INJECTION (ML) INTRAVENOUS ONCE
Refills: 0 | Status: COMPLETED | OUTPATIENT
Start: 2025-08-27 | End: 2025-08-27

## 2025-08-27 RX ORDER — ATORVASTATIN CALCIUM 80 MG/1
40 TABLET, FILM COATED ORAL AT BEDTIME
Refills: 0 | Status: DISCONTINUED | OUTPATIENT
Start: 2025-08-27 | End: 2025-09-02

## 2025-08-27 RX ORDER — ONDANSETRON HCL/PF 4 MG/2 ML
4 VIAL (ML) INJECTION ONCE
Refills: 0 | Status: COMPLETED | OUTPATIENT
Start: 2025-08-27 | End: 2025-08-27

## 2025-08-27 RX ORDER — NALOXONE HYDROCHLORIDE 0.4 MG/ML
0.4 INJECTION, SOLUTION INTRAMUSCULAR; INTRAVENOUS; SUBCUTANEOUS ONCE
Refills: 0 | Status: DISCONTINUED | OUTPATIENT
Start: 2025-08-27 | End: 2025-09-02

## 2025-08-27 RX ORDER — MAGNESIUM, ALUMINUM HYDROXIDE 200-200 MG
30 TABLET,CHEWABLE ORAL EVERY 4 HOURS
Refills: 0 | Status: DISCONTINUED | OUTPATIENT
Start: 2025-08-27 | End: 2025-09-02

## 2025-08-27 RX ORDER — CARVEDILOL 3.12 MG/1
12.5 TABLET, FILM COATED ORAL ONCE
Refills: 0 | Status: COMPLETED | OUTPATIENT
Start: 2025-08-27 | End: 2025-08-27

## 2025-08-27 RX ADMIN — AMPICILLIN SODIUM AND SULBACTAM SODIUM 200 GRAM(S): 1; .5 INJECTION, POWDER, FOR SOLUTION INTRAMUSCULAR; INTRAVENOUS at 22:52

## 2025-08-27 RX ADMIN — AMPICILLIN SODIUM AND SULBACTAM SODIUM 200 GRAM(S): 1; .5 INJECTION, POWDER, FOR SOLUTION INTRAMUSCULAR; INTRAVENOUS at 14:54

## 2025-08-27 RX ADMIN — Medication 15 MILLILITER(S): at 22:52

## 2025-08-27 RX ADMIN — CARVEDILOL 12.5 MILLIGRAM(S): 3.12 TABLET, FILM COATED ORAL at 18:22

## 2025-08-27 RX ADMIN — Medication 250 MILLIGRAM(S): at 16:57

## 2025-08-27 RX ADMIN — Medication 4 MILLIGRAM(S): at 19:47

## 2025-08-27 RX ADMIN — LOSARTAN POTASSIUM 50 MILLIGRAM(S): 100 TABLET, FILM COATED ORAL at 18:22

## 2025-08-27 RX ADMIN — Medication 4 MILLIGRAM(S): at 14:55

## 2025-08-27 RX ADMIN — ATORVASTATIN CALCIUM 40 MILLIGRAM(S): 80 TABLET, FILM COATED ORAL at 22:58

## 2025-08-28 ENCOUNTER — RESULT REVIEW (OUTPATIENT)
Age: 60
End: 2025-08-28

## 2025-08-28 LAB
ALBUMIN SERPL ELPH-MCNC: 3.6 G/DL — SIGNIFICANT CHANGE UP (ref 3.3–5)
ALP SERPL-CCNC: 151 U/L — HIGH (ref 40–120)
ALT FLD-CCNC: 16 U/L — SIGNIFICANT CHANGE UP (ref 4–41)
ANION GAP SERPL CALC-SCNC: 14 MMOL/L — SIGNIFICANT CHANGE UP (ref 7–14)
APTT BLD: 51.7 SEC — HIGH (ref 26.1–36.8)
AST SERPL-CCNC: 16 U/L — SIGNIFICANT CHANGE UP (ref 4–40)
BASOPHILS # BLD AUTO: 0.04 K/UL — SIGNIFICANT CHANGE UP (ref 0–0.2)
BASOPHILS NFR BLD AUTO: 0.3 % — SIGNIFICANT CHANGE UP (ref 0–2)
BILIRUB SERPL-MCNC: 0.4 MG/DL — SIGNIFICANT CHANGE UP (ref 0.2–1.2)
BLD GP AB SCN SERPL QL: NEGATIVE — SIGNIFICANT CHANGE UP
BUN SERPL-MCNC: 13 MG/DL — SIGNIFICANT CHANGE UP (ref 7–23)
CALCIUM SERPL-MCNC: 8.8 MG/DL — SIGNIFICANT CHANGE UP (ref 8.4–10.5)
CHLORIDE SERPL-SCNC: 103 MMOL/L — SIGNIFICANT CHANGE UP (ref 98–107)
CO2 SERPL-SCNC: 17 MMOL/L — LOW (ref 22–31)
CREAT SERPL-MCNC: 1.12 MG/DL — SIGNIFICANT CHANGE UP (ref 0.5–1.3)
EGFR: 76 ML/MIN/1.73M2 — SIGNIFICANT CHANGE UP
EGFR: 76 ML/MIN/1.73M2 — SIGNIFICANT CHANGE UP
EOSINOPHIL # BLD AUTO: 0.22 K/UL — SIGNIFICANT CHANGE UP (ref 0–0.5)
EOSINOPHIL NFR BLD AUTO: 1.6 % — SIGNIFICANT CHANGE UP (ref 0–6)
GLUCOSE SERPL-MCNC: 86 MG/DL — SIGNIFICANT CHANGE UP (ref 70–99)
HCT VFR BLD CALC: 33.9 % — LOW (ref 39–50)
HGB BLD-MCNC: 11.7 G/DL — LOW (ref 13–17)
IMM GRANULOCYTES # BLD AUTO: 0.16 K/UL — HIGH (ref 0–0.07)
IMM GRANULOCYTES NFR BLD AUTO: 1.1 % — HIGH (ref 0–0.9)
INR BLD: 0.98 RATIO — SIGNIFICANT CHANGE UP (ref 0.85–1.16)
LYMPHOCYTES # BLD AUTO: 1.96 K/UL — SIGNIFICANT CHANGE UP (ref 1–3.3)
LYMPHOCYTES NFR BLD AUTO: 13.9 % — SIGNIFICANT CHANGE UP (ref 13–44)
MAGNESIUM SERPL-MCNC: 2 MG/DL — SIGNIFICANT CHANGE UP (ref 1.6–2.6)
MCHC RBC-ENTMCNC: 28.7 PG — SIGNIFICANT CHANGE UP (ref 27–34)
MCHC RBC-ENTMCNC: 34.5 G/DL — SIGNIFICANT CHANGE UP (ref 32–36)
MCV RBC AUTO: 83.3 FL — SIGNIFICANT CHANGE UP (ref 80–100)
MONOCYTES # BLD AUTO: 1.82 K/UL — HIGH (ref 0–0.9)
MONOCYTES NFR BLD AUTO: 12.9 % — SIGNIFICANT CHANGE UP (ref 2–14)
NEUTROPHILS # BLD AUTO: 9.86 K/UL — HIGH (ref 1.8–7.4)
NEUTROPHILS NFR BLD AUTO: 70.2 % — SIGNIFICANT CHANGE UP (ref 43–77)
NRBC # BLD AUTO: 0 K/UL — SIGNIFICANT CHANGE UP (ref 0–0)
NRBC # FLD: 0 K/UL — SIGNIFICANT CHANGE UP (ref 0–0)
NRBC BLD AUTO-RTO: 0 /100 WBCS — SIGNIFICANT CHANGE UP (ref 0–0)
PHOSPHATE SERPL-MCNC: 2.1 MG/DL — LOW (ref 2.5–4.5)
PLATELET # BLD AUTO: 286 K/UL — SIGNIFICANT CHANGE UP (ref 150–400)
PMV BLD: 10.3 FL — SIGNIFICANT CHANGE UP (ref 7–13)
POTASSIUM SERPL-MCNC: 3.3 MMOL/L — LOW (ref 3.5–5.3)
POTASSIUM SERPL-SCNC: 3.3 MMOL/L — LOW (ref 3.5–5.3)
PROT SERPL-MCNC: 6.8 G/DL — SIGNIFICANT CHANGE UP (ref 6–8.3)
PROTHROM AB SERPL-ACNC: 11.4 SEC — SIGNIFICANT CHANGE UP (ref 9.9–13.4)
RBC # BLD: 4.07 M/UL — LOW (ref 4.2–5.8)
RBC # FLD: 16.6 % — HIGH (ref 10.3–14.5)
RH IG SCN BLD-IMP: POSITIVE — SIGNIFICANT CHANGE UP
SODIUM SERPL-SCNC: 134 MMOL/L — LOW (ref 135–145)
WBC # BLD: 14.06 K/UL — HIGH (ref 3.8–10.5)
WBC # FLD AUTO: 14.06 K/UL — HIGH (ref 3.8–10.5)

## 2025-08-28 PROCEDURE — 93010 ELECTROCARDIOGRAM REPORT: CPT

## 2025-08-28 PROCEDURE — 93306 TTE W/DOPPLER COMPLETE: CPT | Mod: 26

## 2025-08-28 PROCEDURE — 99233 SBSQ HOSP IP/OBS HIGH 50: CPT | Mod: GC

## 2025-08-28 RX ORDER — POTASSIUM PHOSPHATE, MONOBASIC POTASSIUM PHOSPHATE, DIBASIC INJECTION, 236; 224 MG/ML; MG/ML
15 SOLUTION, CONCENTRATE INTRAVENOUS ONCE
Refills: 0 | Status: COMPLETED | OUTPATIENT
Start: 2025-08-28 | End: 2025-08-28

## 2025-08-28 RX ORDER — LOSARTAN POTASSIUM 100 MG/1
100 TABLET, FILM COATED ORAL DAILY
Refills: 0 | Status: DISCONTINUED | OUTPATIENT
Start: 2025-08-28 | End: 2025-09-02

## 2025-08-28 RX ADMIN — POTASSIUM PHOSPHATE, MONOBASIC POTASSIUM PHOSPHATE, DIBASIC INJECTION, 62.5 MILLIMOLE(S): 236; 224 SOLUTION, CONCENTRATE INTRAVENOUS at 04:40

## 2025-08-28 RX ADMIN — Medication 81 MILLIGRAM(S): at 10:52

## 2025-08-28 RX ADMIN — Medication 100 MILLIEQUIVALENT(S): at 04:40

## 2025-08-28 RX ADMIN — Medication 4 MILLIGRAM(S): at 21:26

## 2025-08-28 RX ADMIN — Medication 4 MILLIGRAM(S): at 22:26

## 2025-08-28 RX ADMIN — LOSARTAN POTASSIUM 50 MILLIGRAM(S): 100 TABLET, FILM COATED ORAL at 10:52

## 2025-08-28 RX ADMIN — CILOSTAZOL 100 MILLIGRAM(S): 50 TABLET ORAL at 17:43

## 2025-08-28 RX ADMIN — AMPICILLIN SODIUM AND SULBACTAM SODIUM 200 GRAM(S): 1; .5 INJECTION, POWDER, FOR SOLUTION INTRAMUSCULAR; INTRAVENOUS at 15:36

## 2025-08-28 RX ADMIN — Medication 15 MILLILITER(S): at 17:43

## 2025-08-28 RX ADMIN — NICOTINE POLACRILEX 1 PATCH: 4 GUM, CHEWING ORAL at 10:53

## 2025-08-28 RX ADMIN — Medication 4 MILLIGRAM(S): at 17:44

## 2025-08-28 RX ADMIN — Medication 15 MILLILITER(S): at 10:54

## 2025-08-28 RX ADMIN — Medication 100 MILLIEQUIVALENT(S): at 06:39

## 2025-08-28 RX ADMIN — Medication 4 MILLIGRAM(S): at 16:44

## 2025-08-28 RX ADMIN — Medication 2 TABLET(S): at 21:27

## 2025-08-28 RX ADMIN — Medication 100 MILLIEQUIVALENT(S): at 05:46

## 2025-08-28 RX ADMIN — AMPICILLIN SODIUM AND SULBACTAM SODIUM 200 GRAM(S): 1; .5 INJECTION, POWDER, FOR SOLUTION INTRAMUSCULAR; INTRAVENOUS at 04:41

## 2025-08-28 RX ADMIN — ATORVASTATIN CALCIUM 40 MILLIGRAM(S): 80 TABLET, FILM COATED ORAL at 21:27

## 2025-08-28 RX ADMIN — CILOSTAZOL 100 MILLIGRAM(S): 50 TABLET ORAL at 10:52

## 2025-08-28 RX ADMIN — AMPICILLIN SODIUM AND SULBACTAM SODIUM 200 GRAM(S): 1; .5 INJECTION, POWDER, FOR SOLUTION INTRAMUSCULAR; INTRAVENOUS at 21:27

## 2025-08-28 RX ADMIN — CARVEDILOL 12.5 MILLIGRAM(S): 3.12 TABLET, FILM COATED ORAL at 10:52

## 2025-08-28 RX ADMIN — CLOPIDOGREL BISULFATE 75 MILLIGRAM(S): 75 TABLET, FILM COATED ORAL at 10:54

## 2025-08-28 RX ADMIN — AMPICILLIN SODIUM AND SULBACTAM SODIUM 200 GRAM(S): 1; .5 INJECTION, POWDER, FOR SOLUTION INTRAMUSCULAR; INTRAVENOUS at 10:51

## 2025-08-29 ENCOUNTER — TRANSCRIPTION ENCOUNTER (OUTPATIENT)
Age: 60
End: 2025-08-29

## 2025-08-29 LAB
ANION GAP SERPL CALC-SCNC: 14 MMOL/L — SIGNIFICANT CHANGE UP (ref 7–14)
BUN SERPL-MCNC: 10 MG/DL — SIGNIFICANT CHANGE UP (ref 7–23)
CALCIUM SERPL-MCNC: 8.8 MG/DL — SIGNIFICANT CHANGE UP (ref 8.4–10.5)
CHLORIDE SERPL-SCNC: 104 MMOL/L — SIGNIFICANT CHANGE UP (ref 98–107)
CO2 SERPL-SCNC: 19 MMOL/L — LOW (ref 22–31)
CREAT SERPL-MCNC: 1.01 MG/DL — SIGNIFICANT CHANGE UP (ref 0.5–1.3)
EGFR: 86 ML/MIN/1.73M2 — SIGNIFICANT CHANGE UP
EGFR: 86 ML/MIN/1.73M2 — SIGNIFICANT CHANGE UP
GLUCOSE SERPL-MCNC: 111 MG/DL — HIGH (ref 70–99)
HCT VFR BLD CALC: 31.9 % — LOW (ref 39–50)
HGB BLD-MCNC: 10.8 G/DL — LOW (ref 13–17)
MAGNESIUM SERPL-MCNC: 2 MG/DL — SIGNIFICANT CHANGE UP (ref 1.6–2.6)
MCHC RBC-ENTMCNC: 28.5 PG — SIGNIFICANT CHANGE UP (ref 27–34)
MCHC RBC-ENTMCNC: 33.9 G/DL — SIGNIFICANT CHANGE UP (ref 32–36)
MCV RBC AUTO: 84.2 FL — SIGNIFICANT CHANGE UP (ref 80–100)
MRSA PCR RESULT.: SIGNIFICANT CHANGE UP
NRBC # BLD AUTO: 0 K/UL — SIGNIFICANT CHANGE UP (ref 0–0)
NRBC # FLD: 0 K/UL — SIGNIFICANT CHANGE UP (ref 0–0)
NRBC BLD AUTO-RTO: 0 /100 WBCS — SIGNIFICANT CHANGE UP (ref 0–0)
PHOSPHATE SERPL-MCNC: 2.1 MG/DL — LOW (ref 2.5–4.5)
PLATELET # BLD AUTO: 294 K/UL — SIGNIFICANT CHANGE UP (ref 150–400)
PMV BLD: 10.2 FL — SIGNIFICANT CHANGE UP (ref 7–13)
POTASSIUM SERPL-MCNC: 3.9 MMOL/L — SIGNIFICANT CHANGE UP (ref 3.5–5.3)
POTASSIUM SERPL-SCNC: 3.9 MMOL/L — SIGNIFICANT CHANGE UP (ref 3.5–5.3)
RBC # BLD: 3.79 M/UL — LOW (ref 4.2–5.8)
RBC # FLD: 17 % — HIGH (ref 10.3–14.5)
S AUREUS DNA NOSE QL NAA+PROBE: SIGNIFICANT CHANGE UP
SODIUM SERPL-SCNC: 137 MMOL/L — SIGNIFICANT CHANGE UP (ref 135–145)
WBC # BLD: 9.46 K/UL — SIGNIFICANT CHANGE UP (ref 3.8–10.5)
WBC # FLD AUTO: 9.46 K/UL — SIGNIFICANT CHANGE UP (ref 3.8–10.5)

## 2025-08-29 PROCEDURE — 88305 TISSUE EXAM BY PATHOLOGIST: CPT | Mod: 26

## 2025-08-29 PROCEDURE — 88311 DECALCIFY TISSUE: CPT | Mod: 26

## 2025-08-29 PROCEDURE — 99233 SBSQ HOSP IP/OBS HIGH 50: CPT | Mod: GC

## 2025-08-29 PROCEDURE — 99223 1ST HOSP IP/OBS HIGH 75: CPT

## 2025-08-29 PROCEDURE — G0545: CPT

## 2025-08-29 RX ORDER — POTASSIUM PHOSPHATE, MONOBASIC POTASSIUM PHOSPHATE, DIBASIC INJECTION, 236; 224 MG/ML; MG/ML
15 SOLUTION, CONCENTRATE INTRAVENOUS ONCE
Refills: 0 | Status: COMPLETED | OUTPATIENT
Start: 2025-08-29 | End: 2025-08-29

## 2025-08-29 RX ORDER — FENTANYL CITRATE-0.9 % NACL/PF 100MCG/2ML
25 SYRINGE (ML) INTRAVENOUS
Refills: 0 | Status: DISCONTINUED | OUTPATIENT
Start: 2025-08-29 | End: 2025-08-29

## 2025-08-29 RX ADMIN — POTASSIUM PHOSPHATE, MONOBASIC POTASSIUM PHOSPHATE, DIBASIC INJECTION, 62.5 MILLIMOLE(S): 236; 224 SOLUTION, CONCENTRATE INTRAVENOUS at 09:53

## 2025-08-29 RX ADMIN — Medication 2 MILLIGRAM(S): at 22:33

## 2025-08-29 RX ADMIN — LOSARTAN POTASSIUM 100 MILLIGRAM(S): 100 TABLET, FILM COATED ORAL at 05:05

## 2025-08-29 RX ADMIN — AMPICILLIN SODIUM AND SULBACTAM SODIUM 200 GRAM(S): 1; .5 INJECTION, POWDER, FOR SOLUTION INTRAMUSCULAR; INTRAVENOUS at 21:01

## 2025-08-29 RX ADMIN — AMPICILLIN SODIUM AND SULBACTAM SODIUM 200 GRAM(S): 1; .5 INJECTION, POWDER, FOR SOLUTION INTRAMUSCULAR; INTRAVENOUS at 15:00

## 2025-08-29 RX ADMIN — Medication 15 MILLILITER(S): at 05:05

## 2025-08-29 RX ADMIN — Medication 81 MILLIGRAM(S): at 14:00

## 2025-08-29 RX ADMIN — Medication 4 MILLIGRAM(S): at 19:27

## 2025-08-29 RX ADMIN — NICOTINE POLACRILEX 1 PATCH: 4 GUM, CHEWING ORAL at 09:20

## 2025-08-29 RX ADMIN — ATORVASTATIN CALCIUM 40 MILLIGRAM(S): 80 TABLET, FILM COATED ORAL at 21:01

## 2025-08-29 RX ADMIN — AMPICILLIN SODIUM AND SULBACTAM SODIUM 200 GRAM(S): 1; .5 INJECTION, POWDER, FOR SOLUTION INTRAMUSCULAR; INTRAVENOUS at 09:06

## 2025-08-29 RX ADMIN — Medication 4 MILLIGRAM(S): at 05:13

## 2025-08-29 RX ADMIN — Medication 4 MILLIGRAM(S): at 09:20

## 2025-08-29 RX ADMIN — Medication 1 APPLICATION(S): at 05:05

## 2025-08-29 RX ADMIN — Medication 2 MILLIGRAM(S): at 23:03

## 2025-08-29 RX ADMIN — CILOSTAZOL 100 MILLIGRAM(S): 50 TABLET ORAL at 05:06

## 2025-08-29 RX ADMIN — Medication 4 MILLIGRAM(S): at 05:30

## 2025-08-29 RX ADMIN — Medication 4 MILLIGRAM(S): at 19:57

## 2025-08-29 RX ADMIN — CARVEDILOL 12.5 MILLIGRAM(S): 3.12 TABLET, FILM COATED ORAL at 18:27

## 2025-08-29 RX ADMIN — Medication 2 TABLET(S): at 21:01

## 2025-08-29 RX ADMIN — AMPICILLIN SODIUM AND SULBACTAM SODIUM 200 GRAM(S): 1; .5 INJECTION, POWDER, FOR SOLUTION INTRAMUSCULAR; INTRAVENOUS at 04:59

## 2025-08-29 RX ADMIN — Medication 4 MILLIGRAM(S): at 10:20

## 2025-08-29 RX ADMIN — CILOSTAZOL 100 MILLIGRAM(S): 50 TABLET ORAL at 18:26

## 2025-08-30 ENCOUNTER — TRANSCRIPTION ENCOUNTER (OUTPATIENT)
Age: 60
End: 2025-08-30

## 2025-08-30 LAB
ANION GAP SERPL CALC-SCNC: 12 MMOL/L — SIGNIFICANT CHANGE UP (ref 7–14)
BUN SERPL-MCNC: 11 MG/DL — SIGNIFICANT CHANGE UP (ref 7–23)
CALCIUM SERPL-MCNC: 9.2 MG/DL — SIGNIFICANT CHANGE UP (ref 8.4–10.5)
CHLORIDE SERPL-SCNC: 102 MMOL/L — SIGNIFICANT CHANGE UP (ref 98–107)
CO2 SERPL-SCNC: 22 MMOL/L — SIGNIFICANT CHANGE UP (ref 22–31)
CREAT SERPL-MCNC: 0.98 MG/DL — SIGNIFICANT CHANGE UP (ref 0.5–1.3)
EGFR: 89 ML/MIN/1.73M2 — SIGNIFICANT CHANGE UP
EGFR: 89 ML/MIN/1.73M2 — SIGNIFICANT CHANGE UP
GLUCOSE SERPL-MCNC: 206 MG/DL — HIGH (ref 70–99)
GRAM STN FLD: SIGNIFICANT CHANGE UP
HCT VFR BLD CALC: 32.5 % — LOW (ref 39–50)
HGB BLD-MCNC: 11 G/DL — LOW (ref 13–17)
MAGNESIUM SERPL-MCNC: 2 MG/DL — SIGNIFICANT CHANGE UP (ref 1.6–2.6)
MCHC RBC-ENTMCNC: 28.6 PG — SIGNIFICANT CHANGE UP (ref 27–34)
MCHC RBC-ENTMCNC: 33.8 G/DL — SIGNIFICANT CHANGE UP (ref 32–36)
MCV RBC AUTO: 84.4 FL — SIGNIFICANT CHANGE UP (ref 80–100)
NRBC # BLD AUTO: 0 K/UL — SIGNIFICANT CHANGE UP (ref 0–0)
NRBC # FLD: 0 K/UL — SIGNIFICANT CHANGE UP (ref 0–0)
NRBC BLD AUTO-RTO: 0 /100 WBCS — SIGNIFICANT CHANGE UP (ref 0–0)
PHOSPHATE SERPL-MCNC: 2.8 MG/DL — SIGNIFICANT CHANGE UP (ref 2.5–4.5)
PLATELET # BLD AUTO: 357 K/UL — SIGNIFICANT CHANGE UP (ref 150–400)
PMV BLD: 10.5 FL — SIGNIFICANT CHANGE UP (ref 7–13)
POTASSIUM SERPL-MCNC: 4 MMOL/L — SIGNIFICANT CHANGE UP (ref 3.5–5.3)
POTASSIUM SERPL-SCNC: 4 MMOL/L — SIGNIFICANT CHANGE UP (ref 3.5–5.3)
RBC # BLD: 3.85 M/UL — LOW (ref 4.2–5.8)
RBC # FLD: 16.6 % — HIGH (ref 10.3–14.5)
SODIUM SERPL-SCNC: 136 MMOL/L — SIGNIFICANT CHANGE UP (ref 135–145)
SPECIMEN SOURCE: SIGNIFICANT CHANGE UP
WBC # BLD: 7.73 K/UL — SIGNIFICANT CHANGE UP (ref 3.8–10.5)
WBC # FLD AUTO: 7.73 K/UL — SIGNIFICANT CHANGE UP (ref 3.8–10.5)

## 2025-08-30 PROCEDURE — G0545: CPT

## 2025-08-30 PROCEDURE — 99232 SBSQ HOSP IP/OBS MODERATE 35: CPT

## 2025-08-30 PROCEDURE — 99233 SBSQ HOSP IP/OBS HIGH 50: CPT | Mod: GC

## 2025-08-30 RX ADMIN — AMPICILLIN SODIUM AND SULBACTAM SODIUM 200 GRAM(S): 1; .5 INJECTION, POWDER, FOR SOLUTION INTRAMUSCULAR; INTRAVENOUS at 21:19

## 2025-08-30 RX ADMIN — NICOTINE POLACRILEX 1 PATCH: 4 GUM, CHEWING ORAL at 12:25

## 2025-08-30 RX ADMIN — Medication 4 MILLIGRAM(S): at 13:24

## 2025-08-30 RX ADMIN — Medication 15 MILLILITER(S): at 17:54

## 2025-08-30 RX ADMIN — Medication 4 MILLIGRAM(S): at 22:10

## 2025-08-30 RX ADMIN — Medication 4 MILLIGRAM(S): at 23:10

## 2025-08-30 RX ADMIN — Medication 4 MILLIGRAM(S): at 02:45

## 2025-08-30 RX ADMIN — Medication 15 MILLILITER(S): at 05:00

## 2025-08-30 RX ADMIN — AMPICILLIN SODIUM AND SULBACTAM SODIUM 200 GRAM(S): 1; .5 INJECTION, POWDER, FOR SOLUTION INTRAMUSCULAR; INTRAVENOUS at 05:00

## 2025-08-30 RX ADMIN — Medication 4 MILLIGRAM(S): at 02:15

## 2025-08-30 RX ADMIN — AMPICILLIN SODIUM AND SULBACTAM SODIUM 200 GRAM(S): 1; .5 INJECTION, POWDER, FOR SOLUTION INTRAMUSCULAR; INTRAVENOUS at 10:55

## 2025-08-30 RX ADMIN — CARVEDILOL 12.5 MILLIGRAM(S): 3.12 TABLET, FILM COATED ORAL at 05:00

## 2025-08-30 RX ADMIN — CILOSTAZOL 100 MILLIGRAM(S): 50 TABLET ORAL at 05:00

## 2025-08-30 RX ADMIN — CILOSTAZOL 100 MILLIGRAM(S): 50 TABLET ORAL at 17:56

## 2025-08-30 RX ADMIN — Medication 4 MILLIGRAM(S): at 17:55

## 2025-08-30 RX ADMIN — AMPICILLIN SODIUM AND SULBACTAM SODIUM 200 GRAM(S): 1; .5 INJECTION, POWDER, FOR SOLUTION INTRAMUSCULAR; INTRAVENOUS at 15:44

## 2025-08-30 RX ADMIN — Medication 81 MILLIGRAM(S): at 12:24

## 2025-08-30 RX ADMIN — Medication 4 MILLIGRAM(S): at 06:18

## 2025-08-30 RX ADMIN — CLOPIDOGREL BISULFATE 75 MILLIGRAM(S): 75 TABLET, FILM COATED ORAL at 12:25

## 2025-08-30 RX ADMIN — ATORVASTATIN CALCIUM 40 MILLIGRAM(S): 80 TABLET, FILM COATED ORAL at 21:19

## 2025-08-30 RX ADMIN — Medication 1 APPLICATION(S): at 05:01

## 2025-08-30 RX ADMIN — Medication 4 MILLIGRAM(S): at 12:24

## 2025-08-30 RX ADMIN — CARVEDILOL 12.5 MILLIGRAM(S): 3.12 TABLET, FILM COATED ORAL at 17:55

## 2025-08-30 RX ADMIN — LOSARTAN POTASSIUM 100 MILLIGRAM(S): 100 TABLET, FILM COATED ORAL at 05:00

## 2025-08-31 LAB
ANION GAP SERPL CALC-SCNC: 11 MMOL/L — SIGNIFICANT CHANGE UP (ref 7–14)
ANION GAP SERPL CALC-SCNC: 13 MMOL/L — SIGNIFICANT CHANGE UP (ref 7–14)
BUN SERPL-MCNC: 8 MG/DL — SIGNIFICANT CHANGE UP (ref 7–23)
BUN SERPL-MCNC: 8 MG/DL — SIGNIFICANT CHANGE UP (ref 7–23)
CALCIUM SERPL-MCNC: 8.6 MG/DL — SIGNIFICANT CHANGE UP (ref 8.4–10.5)
CALCIUM SERPL-MCNC: 8.8 MG/DL — SIGNIFICANT CHANGE UP (ref 8.4–10.5)
CHLORIDE SERPL-SCNC: 105 MMOL/L — SIGNIFICANT CHANGE UP (ref 98–107)
CHLORIDE SERPL-SCNC: 107 MMOL/L — SIGNIFICANT CHANGE UP (ref 98–107)
CO2 SERPL-SCNC: 23 MMOL/L — SIGNIFICANT CHANGE UP (ref 22–31)
CO2 SERPL-SCNC: 23 MMOL/L — SIGNIFICANT CHANGE UP (ref 22–31)
CREAT SERPL-MCNC: 1 MG/DL — SIGNIFICANT CHANGE UP (ref 0.5–1.3)
CREAT SERPL-MCNC: 1.02 MG/DL — SIGNIFICANT CHANGE UP (ref 0.5–1.3)
EGFR: 85 ML/MIN/1.73M2 — SIGNIFICANT CHANGE UP
EGFR: 85 ML/MIN/1.73M2 — SIGNIFICANT CHANGE UP
EGFR: 87 ML/MIN/1.73M2 — SIGNIFICANT CHANGE UP
EGFR: 87 ML/MIN/1.73M2 — SIGNIFICANT CHANGE UP
GLUCOSE SERPL-MCNC: 71 MG/DL — SIGNIFICANT CHANGE UP (ref 70–99)
GLUCOSE SERPL-MCNC: 98 MG/DL — SIGNIFICANT CHANGE UP (ref 70–99)
GRAM STN FLD: ABNORMAL
HCT VFR BLD CALC: 30.6 % — LOW (ref 39–50)
HGB BLD-MCNC: 10.3 G/DL — LOW (ref 13–17)
MAGNESIUM SERPL-MCNC: 1.8 MG/DL — SIGNIFICANT CHANGE UP (ref 1.6–2.6)
MAGNESIUM SERPL-MCNC: 2 MG/DL — SIGNIFICANT CHANGE UP (ref 1.6–2.6)
MCHC RBC-ENTMCNC: 29 PG — SIGNIFICANT CHANGE UP (ref 27–34)
MCHC RBC-ENTMCNC: 33.7 G/DL — SIGNIFICANT CHANGE UP (ref 32–36)
MCV RBC AUTO: 86.2 FL — SIGNIFICANT CHANGE UP (ref 80–100)
NRBC # BLD AUTO: 0 K/UL — SIGNIFICANT CHANGE UP (ref 0–0)
NRBC # FLD: 0 K/UL — SIGNIFICANT CHANGE UP (ref 0–0)
NRBC BLD AUTO-RTO: 0 /100 WBCS — SIGNIFICANT CHANGE UP (ref 0–0)
PHOSPHATE SERPL-MCNC: 2.2 MG/DL — LOW (ref 2.5–4.5)
PHOSPHATE SERPL-MCNC: 3.3 MG/DL — SIGNIFICANT CHANGE UP (ref 2.5–4.5)
PLATELET # BLD AUTO: 379 K/UL — SIGNIFICANT CHANGE UP (ref 150–400)
PMV BLD: 10.4 FL — SIGNIFICANT CHANGE UP (ref 7–13)
POTASSIUM SERPL-MCNC: 3.9 MMOL/L — SIGNIFICANT CHANGE UP (ref 3.5–5.3)
POTASSIUM SERPL-MCNC: 4.1 MMOL/L — SIGNIFICANT CHANGE UP (ref 3.5–5.3)
POTASSIUM SERPL-SCNC: 3.9 MMOL/L — SIGNIFICANT CHANGE UP (ref 3.5–5.3)
POTASSIUM SERPL-SCNC: 4.1 MMOL/L — SIGNIFICANT CHANGE UP (ref 3.5–5.3)
RBC # BLD: 3.55 M/UL — LOW (ref 4.2–5.8)
RBC # FLD: 16.7 % — HIGH (ref 10.3–14.5)
SODIUM SERPL-SCNC: 141 MMOL/L — SIGNIFICANT CHANGE UP (ref 135–145)
SODIUM SERPL-SCNC: 141 MMOL/L — SIGNIFICANT CHANGE UP (ref 135–145)
WBC # BLD: 10.09 K/UL — SIGNIFICANT CHANGE UP (ref 3.8–10.5)
WBC # FLD AUTO: 10.09 K/UL — SIGNIFICANT CHANGE UP (ref 3.8–10.5)

## 2025-08-31 PROCEDURE — 99233 SBSQ HOSP IP/OBS HIGH 50: CPT | Mod: GC

## 2025-08-31 PROCEDURE — G0545: CPT

## 2025-08-31 PROCEDURE — 99232 SBSQ HOSP IP/OBS MODERATE 35: CPT

## 2025-08-31 RX ORDER — POTASSIUM PHOSPHATE, MONOBASIC POTASSIUM PHOSPHATE, DIBASIC INJECTION, 236; 224 MG/ML; MG/ML
15 SOLUTION, CONCENTRATE INTRAVENOUS ONCE
Refills: 0 | Status: COMPLETED | OUTPATIENT
Start: 2025-08-31 | End: 2025-08-31

## 2025-08-31 RX ORDER — ERTAPENEM SODIUM 1 G/1
1000 INJECTION, POWDER, LYOPHILIZED, FOR SOLUTION INTRAMUSCULAR; INTRAVENOUS EVERY 24 HOURS
Refills: 0 | Status: DISCONTINUED | OUTPATIENT
Start: 2025-08-31 | End: 2025-09-02

## 2025-08-31 RX ORDER — MAGNESIUM SULFATE 500 MG/ML
1 SYRINGE (ML) INJECTION ONCE
Refills: 0 | Status: COMPLETED | OUTPATIENT
Start: 2025-08-31 | End: 2025-08-31

## 2025-08-31 RX ADMIN — NICOTINE POLACRILEX 1 PATCH: 4 GUM, CHEWING ORAL at 12:44

## 2025-08-31 RX ADMIN — Medication 100 GRAM(S): at 01:52

## 2025-08-31 RX ADMIN — Medication 2 MILLIGRAM(S): at 20:02

## 2025-08-31 RX ADMIN — CARVEDILOL 12.5 MILLIGRAM(S): 3.12 TABLET, FILM COATED ORAL at 05:16

## 2025-08-31 RX ADMIN — LOSARTAN POTASSIUM 100 MILLIGRAM(S): 100 TABLET, FILM COATED ORAL at 04:08

## 2025-08-31 RX ADMIN — POTASSIUM PHOSPHATE, MONOBASIC POTASSIUM PHOSPHATE, DIBASIC INJECTION, 62.5 MILLIMOLE(S): 236; 224 SOLUTION, CONCENTRATE INTRAVENOUS at 02:57

## 2025-08-31 RX ADMIN — NICOTINE POLACRILEX 1 PATCH: 4 GUM, CHEWING ORAL at 12:46

## 2025-08-31 RX ADMIN — Medication 2 MILLIGRAM(S): at 19:32

## 2025-08-31 RX ADMIN — Medication 15 MILLILITER(S): at 18:15

## 2025-08-31 RX ADMIN — CARVEDILOL 12.5 MILLIGRAM(S): 3.12 TABLET, FILM COATED ORAL at 18:17

## 2025-08-31 RX ADMIN — AMPICILLIN SODIUM AND SULBACTAM SODIUM 200 GRAM(S): 1; .5 INJECTION, POWDER, FOR SOLUTION INTRAMUSCULAR; INTRAVENOUS at 04:07

## 2025-08-31 RX ADMIN — CILOSTAZOL 100 MILLIGRAM(S): 50 TABLET ORAL at 04:09

## 2025-08-31 RX ADMIN — Medication 15 MILLILITER(S): at 04:08

## 2025-08-31 RX ADMIN — Medication 2 MILLIGRAM(S): at 05:01

## 2025-08-31 RX ADMIN — AMPICILLIN SODIUM AND SULBACTAM SODIUM 200 GRAM(S): 1; .5 INJECTION, POWDER, FOR SOLUTION INTRAMUSCULAR; INTRAVENOUS at 09:30

## 2025-08-31 RX ADMIN — Medication 4 MILLIGRAM(S): at 12:28

## 2025-08-31 RX ADMIN — ERTAPENEM SODIUM 100 MILLIGRAM(S): 1 INJECTION, POWDER, LYOPHILIZED, FOR SOLUTION INTRAMUSCULAR; INTRAVENOUS at 14:39

## 2025-08-31 RX ADMIN — Medication 2 MILLIGRAM(S): at 04:01

## 2025-08-31 RX ADMIN — CLOPIDOGREL BISULFATE 75 MILLIGRAM(S): 75 TABLET, FILM COATED ORAL at 12:28

## 2025-08-31 RX ADMIN — Medication 1 APPLICATION(S): at 05:24

## 2025-08-31 RX ADMIN — Medication 81 MILLIGRAM(S): at 12:28

## 2025-08-31 RX ADMIN — ATORVASTATIN CALCIUM 40 MILLIGRAM(S): 80 TABLET, FILM COATED ORAL at 22:18

## 2025-08-31 RX ADMIN — CILOSTAZOL 100 MILLIGRAM(S): 50 TABLET ORAL at 18:18

## 2025-09-01 LAB
-  AMOXICILLIN/CLAVULANIC ACID: SIGNIFICANT CHANGE UP
-  AMPICILLIN/SULBACTAM: SIGNIFICANT CHANGE UP
-  AMPICILLIN: SIGNIFICANT CHANGE UP
-  AZTREONAM: SIGNIFICANT CHANGE UP
-  CEFAZOLIN: SIGNIFICANT CHANGE UP
-  CEFEPIME: SIGNIFICANT CHANGE UP
-  CEFOXITIN: SIGNIFICANT CHANGE UP
-  CEFTRIAXONE: SIGNIFICANT CHANGE UP
-  CIPROFLOXACIN: SIGNIFICANT CHANGE UP
-  ERTAPENEM: SIGNIFICANT CHANGE UP
-  GENTAMICIN: SIGNIFICANT CHANGE UP
-  IMIPENEM: SIGNIFICANT CHANGE UP
-  LEVOFLOXACIN: SIGNIFICANT CHANGE UP
-  MEROPENEM: SIGNIFICANT CHANGE UP
-  PIPERACILLIN/TAZOBACTAM: SIGNIFICANT CHANGE UP
-  TIGECYCLINE: SIGNIFICANT CHANGE UP
-  TOBRAMYCIN: SIGNIFICANT CHANGE UP
-  TRIMETHOPRIM/SULFAMETHOXAZOLE: SIGNIFICANT CHANGE UP
ANION GAP SERPL CALC-SCNC: 11 MMOL/L — SIGNIFICANT CHANGE UP (ref 7–14)
BUN SERPL-MCNC: 10 MG/DL — SIGNIFICANT CHANGE UP (ref 7–23)
CALCIUM SERPL-MCNC: 9 MG/DL — SIGNIFICANT CHANGE UP (ref 8.4–10.5)
CHLORIDE SERPL-SCNC: 104 MMOL/L — SIGNIFICANT CHANGE UP (ref 98–107)
CO2 SERPL-SCNC: 24 MMOL/L — SIGNIFICANT CHANGE UP (ref 22–31)
CREAT SERPL-MCNC: 1.06 MG/DL — SIGNIFICANT CHANGE UP (ref 0.5–1.3)
CULTURE RESULTS: SIGNIFICANT CHANGE UP
CULTURE RESULTS: SIGNIFICANT CHANGE UP
EGFR: 81 ML/MIN/1.73M2 — SIGNIFICANT CHANGE UP
EGFR: 81 ML/MIN/1.73M2 — SIGNIFICANT CHANGE UP
GLUCOSE SERPL-MCNC: 110 MG/DL — HIGH (ref 70–99)
HCT VFR BLD CALC: 31.2 % — LOW (ref 39–50)
HGB BLD-MCNC: 10.5 G/DL — LOW (ref 13–17)
MAGNESIUM SERPL-MCNC: 1.8 MG/DL — SIGNIFICANT CHANGE UP (ref 1.6–2.6)
MCHC RBC-ENTMCNC: 28.4 PG — SIGNIFICANT CHANGE UP (ref 27–34)
MCHC RBC-ENTMCNC: 33.7 G/DL — SIGNIFICANT CHANGE UP (ref 32–36)
MCV RBC AUTO: 84.3 FL — SIGNIFICANT CHANGE UP (ref 80–100)
METHOD TYPE: SIGNIFICANT CHANGE UP
NRBC # BLD AUTO: 0 K/UL — SIGNIFICANT CHANGE UP (ref 0–0)
NRBC # FLD: 0 K/UL — SIGNIFICANT CHANGE UP (ref 0–0)
NRBC BLD AUTO-RTO: 0 /100 WBCS — SIGNIFICANT CHANGE UP (ref 0–0)
PHOSPHATE SERPL-MCNC: 3 MG/DL — SIGNIFICANT CHANGE UP (ref 2.5–4.5)
PLATELET # BLD AUTO: 391 K/UL — SIGNIFICANT CHANGE UP (ref 150–400)
PMV BLD: 10 FL — SIGNIFICANT CHANGE UP (ref 7–13)
POTASSIUM SERPL-MCNC: 3.7 MMOL/L — SIGNIFICANT CHANGE UP (ref 3.5–5.3)
POTASSIUM SERPL-SCNC: 3.7 MMOL/L — SIGNIFICANT CHANGE UP (ref 3.5–5.3)
RBC # BLD: 3.7 M/UL — LOW (ref 4.2–5.8)
RBC # FLD: 16.5 % — HIGH (ref 10.3–14.5)
SODIUM SERPL-SCNC: 139 MMOL/L — SIGNIFICANT CHANGE UP (ref 135–145)
SPECIMEN SOURCE: SIGNIFICANT CHANGE UP
SPECIMEN SOURCE: SIGNIFICANT CHANGE UP
WBC # BLD: 8.53 K/UL — SIGNIFICANT CHANGE UP (ref 3.8–10.5)
WBC # FLD AUTO: 8.53 K/UL — SIGNIFICANT CHANGE UP (ref 3.8–10.5)

## 2025-09-01 PROCEDURE — 99232 SBSQ HOSP IP/OBS MODERATE 35: CPT

## 2025-09-01 PROCEDURE — G0545: CPT

## 2025-09-01 PROCEDURE — 99233 SBSQ HOSP IP/OBS HIGH 50: CPT | Mod: GC

## 2025-09-01 RX ADMIN — Medication 2 MILLIGRAM(S): at 20:14

## 2025-09-01 RX ADMIN — Medication 2 MILLIGRAM(S): at 21:14

## 2025-09-01 RX ADMIN — LOSARTAN POTASSIUM 100 MILLIGRAM(S): 100 TABLET, FILM COATED ORAL at 06:07

## 2025-09-01 RX ADMIN — NICOTINE POLACRILEX 1 PATCH: 4 GUM, CHEWING ORAL at 12:16

## 2025-09-01 RX ADMIN — Medication 81 MILLIGRAM(S): at 12:12

## 2025-09-01 RX ADMIN — Medication 2 MILLIGRAM(S): at 06:46

## 2025-09-01 RX ADMIN — ERTAPENEM SODIUM 100 MILLIGRAM(S): 1 INJECTION, POWDER, LYOPHILIZED, FOR SOLUTION INTRAMUSCULAR; INTRAVENOUS at 12:17

## 2025-09-01 RX ADMIN — Medication 4 MILLIGRAM(S): at 00:34

## 2025-09-01 RX ADMIN — CILOSTAZOL 100 MILLIGRAM(S): 50 TABLET ORAL at 06:07

## 2025-09-01 RX ADMIN — CLOPIDOGREL BISULFATE 75 MILLIGRAM(S): 75 TABLET, FILM COATED ORAL at 12:13

## 2025-09-01 RX ADMIN — CARVEDILOL 12.5 MILLIGRAM(S): 3.12 TABLET, FILM COATED ORAL at 17:31

## 2025-09-01 RX ADMIN — Medication 15 MILLILITER(S): at 17:31

## 2025-09-01 RX ADMIN — CILOSTAZOL 100 MILLIGRAM(S): 50 TABLET ORAL at 17:33

## 2025-09-01 RX ADMIN — NICOTINE POLACRILEX 1 PATCH: 4 GUM, CHEWING ORAL at 19:43

## 2025-09-01 RX ADMIN — CARVEDILOL 12.5 MILLIGRAM(S): 3.12 TABLET, FILM COATED ORAL at 06:07

## 2025-09-01 RX ADMIN — POLYETHYLENE GLYCOL 3350 17 GRAM(S): 17 POWDER, FOR SOLUTION ORAL at 12:13

## 2025-09-01 RX ADMIN — Medication 15 MILLILITER(S): at 06:08

## 2025-09-01 RX ADMIN — Medication 2 MILLIGRAM(S): at 06:16

## 2025-09-01 RX ADMIN — Medication 4 MILLIGRAM(S): at 01:04

## 2025-09-01 RX ADMIN — ATORVASTATIN CALCIUM 40 MILLIGRAM(S): 80 TABLET, FILM COATED ORAL at 21:06

## 2025-09-01 RX ADMIN — NICOTINE POLACRILEX 1 PATCH: 4 GUM, CHEWING ORAL at 12:13

## 2025-09-02 ENCOUNTER — TRANSCRIPTION ENCOUNTER (OUTPATIENT)
Age: 60
End: 2025-09-02

## 2025-09-02 VITALS
DIASTOLIC BLOOD PRESSURE: 90 MMHG | OXYGEN SATURATION: 100 % | HEART RATE: 80 BPM | SYSTOLIC BLOOD PRESSURE: 144 MMHG | RESPIRATION RATE: 18 BRPM | TEMPERATURE: 98 F

## 2025-09-02 LAB
-  AMOXICILLIN/CLAVULANIC ACID: SIGNIFICANT CHANGE UP
-  AMPICILLIN/SULBACTAM: SIGNIFICANT CHANGE UP
-  AMPICILLIN: SIGNIFICANT CHANGE UP
-  AZTREONAM: SIGNIFICANT CHANGE UP
-  CEFAZOLIN: SIGNIFICANT CHANGE UP
-  CEFEPIME: SIGNIFICANT CHANGE UP
-  CEFOXITIN: SIGNIFICANT CHANGE UP
-  CEFTRIAXONE: SIGNIFICANT CHANGE UP
-  CIPROFLOXACIN: SIGNIFICANT CHANGE UP
-  ERTAPENEM: SIGNIFICANT CHANGE UP
-  GENTAMICIN: SIGNIFICANT CHANGE UP
-  IMIPENEM: SIGNIFICANT CHANGE UP
-  LEVOFLOXACIN: SIGNIFICANT CHANGE UP
-  MEROPENEM: SIGNIFICANT CHANGE UP
-  PIPERACILLIN/TAZOBACTAM: SIGNIFICANT CHANGE UP
-  TIGECYCLINE: SIGNIFICANT CHANGE UP
-  TOBRAMYCIN: SIGNIFICANT CHANGE UP
-  TRIMETHOPRIM/SULFAMETHOXAZOLE: SIGNIFICANT CHANGE UP
ANION GAP SERPL CALC-SCNC: 12 MMOL/L — SIGNIFICANT CHANGE UP (ref 7–14)
BUN SERPL-MCNC: 12 MG/DL — SIGNIFICANT CHANGE UP (ref 7–23)
CALCIUM SERPL-MCNC: 8.9 MG/DL — SIGNIFICANT CHANGE UP (ref 8.4–10.5)
CHLORIDE SERPL-SCNC: 105 MMOL/L — SIGNIFICANT CHANGE UP (ref 98–107)
CO2 SERPL-SCNC: 23 MMOL/L — SIGNIFICANT CHANGE UP (ref 22–31)
CREAT SERPL-MCNC: 1.04 MG/DL — SIGNIFICANT CHANGE UP (ref 0.5–1.3)
EGFR: 83 ML/MIN/1.73M2 — SIGNIFICANT CHANGE UP
EGFR: 83 ML/MIN/1.73M2 — SIGNIFICANT CHANGE UP
GLUCOSE SERPL-MCNC: 104 MG/DL — HIGH (ref 70–99)
HCT VFR BLD CALC: 31.7 % — LOW (ref 39–50)
HGB BLD-MCNC: 10.6 G/DL — LOW (ref 13–17)
MAGNESIUM SERPL-MCNC: 1.8 MG/DL — SIGNIFICANT CHANGE UP (ref 1.6–2.6)
MCHC RBC-ENTMCNC: 28.6 PG — SIGNIFICANT CHANGE UP (ref 27–34)
MCHC RBC-ENTMCNC: 33.4 G/DL — SIGNIFICANT CHANGE UP (ref 32–36)
MCV RBC AUTO: 85.4 FL — SIGNIFICANT CHANGE UP (ref 80–100)
METHOD TYPE: SIGNIFICANT CHANGE UP
NRBC # BLD AUTO: 0 K/UL — SIGNIFICANT CHANGE UP (ref 0–0)
NRBC # FLD: 0 K/UL — SIGNIFICANT CHANGE UP (ref 0–0)
NRBC BLD AUTO-RTO: 0 /100 WBCS — SIGNIFICANT CHANGE UP (ref 0–0)
PHOSPHATE SERPL-MCNC: 2.8 MG/DL — SIGNIFICANT CHANGE UP (ref 2.5–4.5)
PLATELET # BLD AUTO: 418 K/UL — HIGH (ref 150–400)
PMV BLD: 10.7 FL — SIGNIFICANT CHANGE UP (ref 7–13)
POTASSIUM SERPL-MCNC: 3.6 MMOL/L — SIGNIFICANT CHANGE UP (ref 3.5–5.3)
POTASSIUM SERPL-SCNC: 3.6 MMOL/L — SIGNIFICANT CHANGE UP (ref 3.5–5.3)
RBC # BLD: 3.71 M/UL — LOW (ref 4.2–5.8)
RBC # FLD: 16.7 % — HIGH (ref 10.3–14.5)
SODIUM SERPL-SCNC: 140 MMOL/L — SIGNIFICANT CHANGE UP (ref 135–145)
WBC # BLD: 7.23 K/UL — SIGNIFICANT CHANGE UP (ref 3.8–10.5)
WBC # FLD AUTO: 7.23 K/UL — SIGNIFICANT CHANGE UP (ref 3.8–10.5)

## 2025-09-02 PROCEDURE — 93010 ELECTROCARDIOGRAM REPORT: CPT

## 2025-09-02 PROCEDURE — G0545: CPT

## 2025-09-02 PROCEDURE — 99239 HOSP IP/OBS DSCHRG MGMT >30: CPT

## 2025-09-02 PROCEDURE — 99232 SBSQ HOSP IP/OBS MODERATE 35: CPT

## 2025-09-02 RX ORDER — ATORVASTATIN CALCIUM 80 MG/1
1 TABLET, FILM COATED ORAL
Qty: 30 | Refills: 0
Start: 2025-09-02 | End: 2025-10-01

## 2025-09-02 RX ORDER — LOSARTAN POTASSIUM 100 MG/1
1 TABLET, FILM COATED ORAL
Qty: 30 | Refills: 0
Start: 2025-09-02 | End: 2025-10-01

## 2025-09-02 RX ORDER — CARVEDILOL 3.12 MG/1
1 TABLET, FILM COATED ORAL
Qty: 0 | Refills: 0 | DISCHARGE
Start: 2025-09-02

## 2025-09-02 RX ORDER — ASPIRIN 325 MG
1 TABLET ORAL
Qty: 0 | Refills: 0 | DISCHARGE
Start: 2025-09-02

## 2025-09-02 RX ORDER — CIPROFLOXACIN HCL 250 MG
1 TABLET ORAL
Qty: 78 | Refills: 0
Start: 2025-09-02 | End: 2025-10-10

## 2025-09-02 RX ORDER — CHLORTHALIDONE 25 MG/1
1 TABLET ORAL
Refills: 0 | DISCHARGE

## 2025-09-02 RX ORDER — ASPIRIN 325 MG
1 TABLET ORAL
Qty: 30 | Refills: 0
Start: 2025-09-02 | End: 2025-10-01

## 2025-09-02 RX ORDER — ACETAMINOPHEN 500 MG/5ML
2 LIQUID (ML) ORAL
Qty: 0 | Refills: 0 | DISCHARGE
Start: 2025-09-02

## 2025-09-02 RX ORDER — ATORVASTATIN CALCIUM 80 MG/1
1 TABLET, FILM COATED ORAL
Qty: 0 | Refills: 0 | DISCHARGE
Start: 2025-09-02

## 2025-09-02 RX ORDER — CLOPIDOGREL BISULFATE 75 MG/1
1 TABLET, FILM COATED ORAL
Qty: 0 | Refills: 0 | DISCHARGE
Start: 2025-09-02

## 2025-09-02 RX ORDER — NICOTINE POLACRILEX 4 MG/1
1 GUM, CHEWING ORAL
Qty: 0 | Refills: 0 | DISCHARGE
Start: 2025-09-02

## 2025-09-02 RX ORDER — CILOSTAZOL 50 MG/1
1 TABLET ORAL
Qty: 0 | Refills: 0 | DISCHARGE
Start: 2025-09-02

## 2025-09-02 RX ORDER — AMOXICILLIN AND CLAVULANATE POTASSIUM 500; 125 MG/1; MG/1
1 TABLET, FILM COATED ORAL
Qty: 78 | Refills: 0
Start: 2025-09-02 | End: 2025-10-10

## 2025-09-02 RX ADMIN — CLOPIDOGREL BISULFATE 75 MILLIGRAM(S): 75 TABLET, FILM COATED ORAL at 11:10

## 2025-09-02 RX ADMIN — ERTAPENEM SODIUM 100 MILLIGRAM(S): 1 INJECTION, POWDER, LYOPHILIZED, FOR SOLUTION INTRAMUSCULAR; INTRAVENOUS at 11:09

## 2025-09-02 RX ADMIN — CARVEDILOL 12.5 MILLIGRAM(S): 3.12 TABLET, FILM COATED ORAL at 05:10

## 2025-09-02 RX ADMIN — Medication 15 MILLILITER(S): at 05:09

## 2025-09-02 RX ADMIN — LOSARTAN POTASSIUM 100 MILLIGRAM(S): 100 TABLET, FILM COATED ORAL at 05:09

## 2025-09-02 RX ADMIN — Medication 1 APPLICATION(S): at 05:10

## 2025-09-02 RX ADMIN — Medication 2 MILLIGRAM(S): at 04:56

## 2025-09-02 RX ADMIN — NICOTINE POLACRILEX 1 PATCH: 4 GUM, CHEWING ORAL at 11:10

## 2025-09-02 RX ADMIN — NICOTINE POLACRILEX 1 PATCH: 4 GUM, CHEWING ORAL at 11:25

## 2025-09-02 RX ADMIN — Medication 2 MILLIGRAM(S): at 03:56

## 2025-09-02 RX ADMIN — Medication 81 MILLIGRAM(S): at 11:10

## 2025-09-02 RX ADMIN — CILOSTAZOL 100 MILLIGRAM(S): 50 TABLET ORAL at 05:09

## 2025-09-02 RX ADMIN — NICOTINE POLACRILEX 1 PATCH: 4 GUM, CHEWING ORAL at 07:54

## 2025-09-04 LAB
CULTURE RESULTS: ABNORMAL
ORGANISM # SPEC MICROSCOPIC CNT: ABNORMAL
SPECIMEN SOURCE: SIGNIFICANT CHANGE UP

## 2025-09-08 LAB — SURGICAL PATHOLOGY STUDY: SIGNIFICANT CHANGE UP

## 2025-09-10 PROBLEM — E78.5 HYPERLIPIDEMIA, UNSPECIFIED: Chronic | Status: ACTIVE | Noted: 2025-08-27

## 2025-09-11 ENCOUNTER — APPOINTMENT (OUTPATIENT)
Age: 60
End: 2025-09-11
Payer: MEDICARE

## 2025-09-11 PROCEDURE — 99024 POSTOP FOLLOW-UP VISIT: CPT

## 2025-09-27 LAB
CULTURE RESULTS: SIGNIFICANT CHANGE UP
SPECIMEN SOURCE: SIGNIFICANT CHANGE UP

## (undated) DEVICE — DRAPE INSTRUMENT POUCH 6.75" X 11"

## (undated) DEVICE — POSITIONER PATIENT SAFETY STRAP 3X60"

## (undated) DEVICE — Device

## (undated) DEVICE — SOL IRR POUR H2O 250ML

## (undated) DEVICE — BUR STRYKER ROUND FAST CUTTING 4MM

## (undated) DEVICE — MEDICATION LABELS W MARKER

## (undated) DEVICE — SUT SOFSILK 3-0 18" C-14

## (undated) DEVICE — STAPLER SKIN VISI-STAT 35 WIDE

## (undated) DEVICE — SUCTION YANKAUER NO CONTROL VENT

## (undated) DEVICE — TUBING SUCTION 20FT

## (undated) DEVICE — SOL IRR POUR NS 0.9% 500ML

## (undated) DEVICE — DRAPE SPLIT SHEET 77" X 108"

## (undated) DEVICE — SUCTION YANKAUER OPEN TIP NO VENT CURVE

## (undated) DEVICE — DRAPE TOWEL BLUE 17" X 24"

## (undated) DEVICE — MARKING PEN W RULER

## (undated) DEVICE — SPECIMEN CONTAINER 100ML

## (undated) DEVICE — PREP BETADINE KIT

## (undated) DEVICE — WARMING BLANKET LOWER ADULT

## (undated) DEVICE — VENODYNE/SCD SLEEVE CALF MEDIUM

## (undated) DEVICE — DRAPE 3/4 SHEET W REINFORCEMENT 56X77"

## (undated) DEVICE — BUR STRYKER CROSS CUT 1.6MM

## (undated) DEVICE — DRAPE MAYO STAND 30"

## (undated) DEVICE — BASIN AMBULATORY

## (undated) DEVICE — ELCTR BOVIE PENCIL SMOKE EVACUATION

## (undated) DEVICE — POSITIONER FOAM EGG CRATE ULNAR 2PCS (PINK)

## (undated) DEVICE — DRSG STERISTRIPS 0.5 X 4"

## (undated) DEVICE — SYR LUER LOK 10CC

## (undated) DEVICE — PACK GENERAL MINOR

## (undated) DEVICE — STRYKER COLORADO N-SERIES 3CM STRAIGHT

## (undated) DEVICE — WARMING BLANKET DUO-THERM HYPER/HYPOTHERM ADULT

## (undated) DEVICE — BUR STRYKER OVAL CARBIDE 4MM

## (undated) DEVICE — SUT SOFSILK 2-0 30" TIES